# Patient Record
Sex: FEMALE | Race: WHITE | Employment: OTHER | URBAN - METROPOLITAN AREA
[De-identification: names, ages, dates, MRNs, and addresses within clinical notes are randomized per-mention and may not be internally consistent; named-entity substitution may affect disease eponyms.]

---

## 2017-03-10 ENCOUNTER — OFFICE VISIT (OUTPATIENT)
Dept: FAMILY MEDICINE CLINIC | Age: 67
End: 2017-03-10

## 2017-03-10 VITALS
SYSTOLIC BLOOD PRESSURE: 100 MMHG | DIASTOLIC BLOOD PRESSURE: 60 MMHG | OXYGEN SATURATION: 97 % | BODY MASS INDEX: 21.73 KG/M2 | HEART RATE: 65 BPM | WEIGHT: 115 LBS

## 2017-03-10 DIAGNOSIS — K43.9 HERNIA OF ANTERIOR ABDOMINAL WALL: Primary | ICD-10-CM

## 2017-03-10 DIAGNOSIS — J43.8 OTHER EMPHYSEMA (HCC): ICD-10-CM

## 2017-03-10 DIAGNOSIS — E78.2 MIXED HYPERLIPIDEMIA: ICD-10-CM

## 2017-03-10 DIAGNOSIS — Z76.0 ENCOUNTER FOR MEDICATION REFILL: ICD-10-CM

## 2017-03-10 DIAGNOSIS — R19.00 ABDOMINAL MASS, UNSPECIFIED LOCATION: ICD-10-CM

## 2017-03-10 DIAGNOSIS — Z13.9 SCREENING: ICD-10-CM

## 2017-03-10 DIAGNOSIS — R53.83 FATIGUE, UNSPECIFIED TYPE: ICD-10-CM

## 2017-03-10 DIAGNOSIS — J44.9 OBSTRUCTIVE CHRONIC BRONCHITIS WITHOUT EXACERBATION (HCC): ICD-10-CM

## 2017-03-10 PROCEDURE — G8399 PT W/DXA RESULTS DOCUMENT: HCPCS | Performed by: FAMILY MEDICINE

## 2017-03-10 PROCEDURE — 99213 OFFICE O/P EST LOW 20 MIN: CPT | Performed by: FAMILY MEDICINE

## 2017-03-10 PROCEDURE — 1090F PRES/ABSN URINE INCON ASSESS: CPT | Performed by: FAMILY MEDICINE

## 2017-03-10 PROCEDURE — 1123F ACP DISCUSS/DSCN MKR DOCD: CPT | Performed by: FAMILY MEDICINE

## 2017-03-10 PROCEDURE — 1036F TOBACCO NON-USER: CPT | Performed by: FAMILY MEDICINE

## 2017-03-10 PROCEDURE — 3014F SCREEN MAMMO DOC REV: CPT | Performed by: FAMILY MEDICINE

## 2017-03-10 PROCEDURE — G8484 FLU IMMUNIZE NO ADMIN: HCPCS | Performed by: FAMILY MEDICINE

## 2017-03-10 PROCEDURE — G8427 DOCREV CUR MEDS BY ELIG CLIN: HCPCS | Performed by: FAMILY MEDICINE

## 2017-03-10 PROCEDURE — G8926 SPIRO NO PERF OR DOC: HCPCS | Performed by: FAMILY MEDICINE

## 2017-03-10 PROCEDURE — G8419 CALC BMI OUT NRM PARAM NOF/U: HCPCS | Performed by: FAMILY MEDICINE

## 2017-03-10 PROCEDURE — 3017F COLORECTAL CA SCREEN DOC REV: CPT | Performed by: FAMILY MEDICINE

## 2017-03-10 PROCEDURE — 3023F SPIROM DOC REV: CPT | Performed by: FAMILY MEDICINE

## 2017-03-10 PROCEDURE — 4040F PNEUMOC VAC/ADMIN/RCVD: CPT | Performed by: FAMILY MEDICINE

## 2017-03-10 RX ORDER — PROPRANOLOL HYDROCHLORIDE 10 MG/1
TABLET ORAL
Qty: 270 TABLET | Refills: 3 | Status: SHIPPED | OUTPATIENT
Start: 2017-03-10 | End: 2018-06-05 | Stop reason: SDUPTHER

## 2017-03-10 RX ORDER — TOPIRAMATE 25 MG/1
25 TABLET ORAL DAILY
Qty: 180 TABLET | Refills: 1 | Status: SHIPPED | OUTPATIENT
Start: 2017-03-10 | End: 2018-06-05 | Stop reason: SDUPTHER

## 2017-03-10 RX ORDER — SUMATRIPTAN 100 MG/1
TABLET, FILM COATED ORAL
Qty: 9 TABLET | Refills: 2 | Status: SHIPPED | OUTPATIENT
Start: 2017-03-10 | End: 2019-04-29 | Stop reason: SDUPTHER

## 2017-03-16 ENCOUNTER — OFFICE VISIT (OUTPATIENT)
Dept: SURGERY | Age: 67
End: 2017-03-16

## 2017-03-16 VITALS — BODY MASS INDEX: 21.92 KG/M2 | WEIGHT: 116 LBS | DIASTOLIC BLOOD PRESSURE: 64 MMHG | SYSTOLIC BLOOD PRESSURE: 110 MMHG

## 2017-03-16 DIAGNOSIS — R22.42 HIP MASS, LEFT: ICD-10-CM

## 2017-03-16 DIAGNOSIS — K43.9 VENTRAL HERNIA WITHOUT OBSTRUCTION OR GANGRENE: Primary | ICD-10-CM

## 2017-03-16 PROCEDURE — 1123F ACP DISCUSS/DSCN MKR DOCD: CPT | Performed by: SURGERY

## 2017-03-16 PROCEDURE — 3017F COLORECTAL CA SCREEN DOC REV: CPT | Performed by: SURGERY

## 2017-03-16 PROCEDURE — G8399 PT W/DXA RESULTS DOCUMENT: HCPCS | Performed by: SURGERY

## 2017-03-16 PROCEDURE — G8484 FLU IMMUNIZE NO ADMIN: HCPCS | Performed by: SURGERY

## 2017-03-16 PROCEDURE — G8427 DOCREV CUR MEDS BY ELIG CLIN: HCPCS | Performed by: SURGERY

## 2017-03-16 PROCEDURE — 1036F TOBACCO NON-USER: CPT | Performed by: SURGERY

## 2017-03-16 PROCEDURE — G8419 CALC BMI OUT NRM PARAM NOF/U: HCPCS | Performed by: SURGERY

## 2017-03-16 PROCEDURE — 4040F PNEUMOC VAC/ADMIN/RCVD: CPT | Performed by: SURGERY

## 2017-03-16 PROCEDURE — 1090F PRES/ABSN URINE INCON ASSESS: CPT | Performed by: SURGERY

## 2017-03-16 PROCEDURE — 99203 OFFICE O/P NEW LOW 30 MIN: CPT | Performed by: SURGERY

## 2017-03-16 PROCEDURE — 3014F SCREEN MAMMO DOC REV: CPT | Performed by: SURGERY

## 2017-03-16 ASSESSMENT — ENCOUNTER SYMPTOMS
ABDOMINAL PAIN: 1
DIARRHEA: 1
CONSTIPATION: 1
EYES NEGATIVE: 1
RESPIRATORY NEGATIVE: 1

## 2017-04-19 ENCOUNTER — HOSPITAL ENCOUNTER (OUTPATIENT)
Dept: SURGERY | Age: 67
Discharge: HOME | End: 2017-04-19
Attending: SURGERY | Admitting: SURGERY

## 2017-04-19 VITALS
HEART RATE: 60 BPM | WEIGHT: 111.33 LBS | DIASTOLIC BLOOD PRESSURE: 43 MMHG | OXYGEN SATURATION: 99 % | RESPIRATION RATE: 14 BRPM | TEMPERATURE: 97.6 F | HEIGHT: 61 IN | BODY MASS INDEX: 21.02 KG/M2 | SYSTOLIC BLOOD PRESSURE: 110 MMHG

## 2017-04-19 PROCEDURE — 27047 EXC HIP/PELVIS LES SC < 3 CM: CPT | Performed by: SURGERY

## 2017-04-19 RX ORDER — HYDROCODONE BITARTRATE AND ACETAMINOPHEN 5; 325 MG/1; MG/1
1 TABLET ORAL EVERY 4 HOURS PRN
Qty: 15 TABLET | Refills: 0 | Status: SHIPPED | OUTPATIENT
Start: 2017-04-19 | End: 2017-04-26

## 2017-04-19 ASSESSMENT — PAIN - FUNCTIONAL ASSESSMENT: PAIN_FUNCTIONAL_ASSESSMENT: 0-10

## 2017-04-19 ASSESSMENT — PAIN SCALES - GENERAL: PAINLEVEL_OUTOF10: 0

## 2017-09-20 ENCOUNTER — TELEPHONE (OUTPATIENT)
Dept: FAMILY MEDICINE CLINIC | Age: 67
End: 2017-09-20

## 2017-09-20 DIAGNOSIS — E78.2 MIXED HYPERLIPIDEMIA: ICD-10-CM

## 2017-09-20 DIAGNOSIS — R53.83 FATIGUE, UNSPECIFIED TYPE: ICD-10-CM

## 2017-09-20 DIAGNOSIS — Z13.9 SCREENING FOR CONDITION: ICD-10-CM

## 2017-09-20 LAB
A/G RATIO: 1.9 (ref 1.1–2.2)
ALBUMIN SERPL-MCNC: 4.3 G/DL (ref 3.4–5)
ALP BLD-CCNC: 57 U/L (ref 40–129)
ALT SERPL-CCNC: 12 U/L (ref 10–40)
ANION GAP SERPL CALCULATED.3IONS-SCNC: 15 MMOL/L (ref 3–16)
AST SERPL-CCNC: 11 U/L (ref 15–37)
BILIRUB SERPL-MCNC: 0.4 MG/DL (ref 0–1)
BUN BLDV-MCNC: 12 MG/DL (ref 7–20)
CALCIUM SERPL-MCNC: 9.7 MG/DL (ref 8.3–10.6)
CHLORIDE BLD-SCNC: 102 MMOL/L (ref 99–110)
CHOLESTEROL, TOTAL: 184 MG/DL (ref 0–199)
CO2: 21 MMOL/L (ref 21–32)
CREAT SERPL-MCNC: 0.6 MG/DL (ref 0.6–1.2)
ESTIMATED AVERAGE GLUCOSE: 102.5 MG/DL
GFR AFRICAN AMERICAN: >60
GFR NON-AFRICAN AMERICAN: >60
GLOBULIN: 2.3 G/DL
GLUCOSE BLD-MCNC: 93 MG/DL (ref 70–99)
HBA1C MFR BLD: 5.2 %
HCT VFR BLD CALC: 42.7 % (ref 36–48)
HDLC SERPL-MCNC: 66 MG/DL (ref 40–60)
HEMOGLOBIN: 14.3 G/DL (ref 12–16)
HEPATITIS C ANTIBODY INTERPRETATION: NORMAL
LDL CHOLESTEROL CALCULATED: 102 MG/DL
MCH RBC QN AUTO: 32.6 PG (ref 26–34)
MCHC RBC AUTO-ENTMCNC: 33.4 G/DL (ref 31–36)
MCV RBC AUTO: 97.6 FL (ref 80–100)
PDW BLD-RTO: 13.5 % (ref 12.4–15.4)
PLATELET # BLD: 207 K/UL (ref 135–450)
PMV BLD AUTO: 8.2 FL (ref 5–10.5)
POTASSIUM SERPL-SCNC: 4 MMOL/L (ref 3.5–5.1)
RBC # BLD: 4.37 M/UL (ref 4–5.2)
SODIUM BLD-SCNC: 138 MMOL/L (ref 136–145)
T4 FREE: 1.3 NG/DL (ref 0.9–1.8)
TOTAL PROTEIN: 6.6 G/DL (ref 6.4–8.2)
TRIGL SERPL-MCNC: 78 MG/DL (ref 0–150)
TSH SERPL DL<=0.05 MIU/L-ACNC: 1.14 UIU/ML (ref 0.27–4.2)
VITAMIN D 25-HYDROXY: 28.8 NG/ML
VLDLC SERPL CALC-MCNC: 16 MG/DL
WBC # BLD: 5.3 K/UL (ref 4–11)

## 2017-10-23 ENCOUNTER — OFFICE VISIT (OUTPATIENT)
Dept: FAMILY MEDICINE CLINIC | Age: 67
End: 2017-10-23

## 2017-10-23 VITALS
HEART RATE: 65 BPM | SYSTOLIC BLOOD PRESSURE: 100 MMHG | OXYGEN SATURATION: 95 % | WEIGHT: 109 LBS | HEIGHT: 63 IN | BODY MASS INDEX: 19.31 KG/M2 | DIASTOLIC BLOOD PRESSURE: 60 MMHG | RESPIRATION RATE: 14 BRPM

## 2017-10-23 DIAGNOSIS — E78.2 MIXED HYPERLIPIDEMIA: Primary | ICD-10-CM

## 2017-10-23 DIAGNOSIS — C50.912 MALIGNANT NEOPLASM OF LEFT FEMALE BREAST, UNSPECIFIED ESTROGEN RECEPTOR STATUS, UNSPECIFIED SITE OF BREAST (HCC): ICD-10-CM

## 2017-10-23 DIAGNOSIS — M81.0 OSTEOPOROSIS WITHOUT CURRENT PATHOLOGICAL FRACTURE, UNSPECIFIED OSTEOPOROSIS TYPE: ICD-10-CM

## 2017-10-23 DIAGNOSIS — F43.21 GRIEF: ICD-10-CM

## 2017-10-23 DIAGNOSIS — J43.8 OTHER EMPHYSEMA (HCC): ICD-10-CM

## 2017-10-23 DIAGNOSIS — Z72.0 TOBACCO ABUSE: ICD-10-CM

## 2017-10-23 PROCEDURE — G0444 DEPRESSION SCREEN ANNUAL: HCPCS | Performed by: FAMILY MEDICINE

## 2017-10-23 PROCEDURE — G0439 PPPS, SUBSEQ VISIT: HCPCS | Performed by: FAMILY MEDICINE

## 2017-10-23 ASSESSMENT — ANXIETY QUESTIONNAIRES: GAD7 TOTAL SCORE: 0

## 2017-10-23 ASSESSMENT — LIFESTYLE VARIABLES: HOW OFTEN DO YOU HAVE A DRINK CONTAINING ALCOHOL: 0

## 2017-10-23 NOTE — PROGRESS NOTES
Medicare Annual Wellness Visit       Milo De Anda  YOB: 1950    Date of Service:  10/23/2017    Patient Active Problem List   Diagnosis    Hyperlipidemia    Polyp of colon    [facility] [address]    Pleural plaque with presence of asbestos    GERD (gastroesophageal reflux disease)    Hip mass       Allergies   Allergen Reactions    Montelukast Sodium Other (See Comments)     Vision issues     Outpatient Prescriptions Marked as Taking for the 10/23/17 encounter (Office Visit) with Edel Ochoa MD   Medication Sig Dispense Refill    propranolol (INDERAL) 10 MG tablet TAKE 1 TABLET BY MOUTH THREE TIMES DAILY 270 tablet 3    SUMAtriptan (IMITREX) 100 MG tablet TAKE 1 TABLET BY MOUTH FOR 1 DOSE AS NEEDED FOR MIGRAINE 9 tablet 2    topiramate (TOPAMAX) 25 MG tablet Take 1 tablet by mouth daily 180 tablet 1       Past Medical History:   Diagnosis Date    Acne     [facility] cancer ([facility] [address]     Cellulitis     [facility]     GERD (gastroesophageal reflux disease)     Hyperlipidemia     Osteoporosis     Polyp of colon      Past Surgical History:   Procedure Laterality Date    APPENDECTOMY      BREAST SURGERY      DILATION AND CURETTAGE OF UTERUS      EYE SURGERY Right 3/21/15    macular \"hole\"    HAND SURGERY       History reviewed. No pertinent family history. Social History     Social History    Marital status:      Spouse name: N/A    Number of children: N/A    Years of education: N/A     Occupational History    Not on file. Social History Main Topics    Smoking status: Former Smoker     Packs/day: 1.00     Years: 30.00     Types: Cigarettes     Quit date: 3/1/2010    Smokeless tobacco: Never Used      Comment: H.O.smoking 1 p.p.d x 30+ yrs / Quit 3/1/2010    Alcohol use Yes      Comment: 1 beer/mo.     Drug use: Unknown    Sexual activity: Not on file     Other Topics Concern    Not on file     Social History Narrative    No narrative on file       Consultants:   Patient Care Team:  Africa Esparza MD as PCP - Mary Hickman MD as PCP - [facility] Attributed Provider  Markie Escalante MD as Surgeon (General Surgery)  Dr. Morgan Hendricks, [address] form completed by patient, reviewed and scanned into chart. Summary of HRA, and behavioral, psychosocial, cognitive and functional/safety screening results are documented in additional note within this encounter. Wt Readings from Last 3 Encounters:   10/23/17 109 lb (49.4 kg)   17 111 lb 5.3 oz (50.5 kg)   17 110 lb (49.9 kg)     BP Readings from Last 3 Encounters:   10/23/17 100/60   17 (!) 110/43   17 110/64     View of systems:   last May and still grieving  Lives alone not eating much  Back to smoking about 5 cigarettes a day line exercises quite a bit  Headaches are well controlled only gets one a month  Tremor is worse and is about to see her neurologist next week  Pertinent Physical Exam    Vitals:    10/23/17 0838   BP: 100/60   Site: Left Arm   Position: Sitting   Cuff Size: Medium Adult   Pulse: 65   Resp: 14   SpO2: 95%   Weight: 109 lb (49.4 kg)   Height: 5' 2.8\" (1.595 m)     Body mass index is 19.43 kg/m².    General Appearance: alert and oriented to person, place and time, well-developed and well-nourished, in no acute distress, thin, resting tremor  Neck: neck supple and non tender without mass, no thyromegaly or thyroid nodules, no cervical lymphadenopathy   Pulmonary/Chest: clear to auscultation bilaterally- no wheezes, rales or rhonchi, normal air movement, no respiratory distress  Cardiovascular: normal rate, normal S1 and S2, no gallops, intact distal pulses and no carotid bruits  Abdomen: soft, non-tender, non-distended, normal bowel sounds, no masses or organomegaly  Extremities: no cyanosis and no clubbing  Neuro: Significant resting tremor left hand greater than right hand and head-bobbing  Current Health Maintenance Status  Health Maintenance   Topic Date Due    DTaP/Tdap/Td vaccine (1 - Tdap) 12/15/1967    Breast cancer screen  12/15/1998    Zostavax vaccine  12/15/2008    Flu vaccine (1) 09/01/2017    Pneumococcal low/med risk (2 of 2 - PPSV23) 09/12/2017    Colon cancer screen colonoscopy  05/01/2019    Lipid screen  09/20/2022    DEXA (modify frequency per FRAX score)  Completed    Hepatitis C screen  Completed         Personalized Preventive Plan   This plan is provided to the patient in written form.        Preventive plan of care for Nyla Eastern        10/23/2017           Preventive Measures Status       Recommendations for screening   Colon Cancer Screen   Last colonoscopy: 5-14 Screening covered every 10 years  Repeat in 5 years   Breast Cancer Screen  Last mammogram: years  Covered annually  Repeat screening is not clinically indicated since had double mastectomy   Cervical Cancer Screen   Last PAP smear: years Covered every 2 years, annually if high risk  This test is not clinically indicated   Osteoporosis Screen   Last DXA scan: 2014 Covered every 2 years  Test recommended and ordered   Diabetes Screen  Glucose (mg/dL)   Date Value   09/20/2017 93    Screening covered annually, every 6 months if pre-diabetic  Repeat yearly   Cholesterol Screen  Lab Results   Component Value Date    CHOL 184 09/20/2017    TRIG 78 09/20/2017    HDL 66 (H) 09/20/2017    LDLCALC 102 (H) 09/20/2017    Screening covered every 5 years   Repeat yearly   Aspirin for Cardiovascular Prevention   No Not indicated    Recommended Immunizations    Immunization History   Administered Date(s) Administered    Influenza Virus Vaccine 09/16/2011    Influenza, High Dose 01/06/2016    Influenza, Intradermal, Preservative free 09/12/2012    PPD Test 03/14/2011    Pneumococcal 13-valent Conjugate ([id number]) 01/06/2016    Pneumococcal Polysaccharide ([id number]) 09/12/2012

## 2017-10-23 NOTE — PATIENT INSTRUCTIONS
and other things that can increase your risk for heart attack and stroke. · Blood pressure. Have your blood pressure checked during a routine doctor visit. Your doctor will tell you how often to check your blood pressure based on your age, your blood pressure results, and other factors. · Diabetes. Ask your doctor whether you should have tests for diabetes. · Vision. Experts recommend that you have yearly exams for glaucoma and other age-related eye problems. · Hearing. Tell your doctor if you notice any change in your hearing. You can have tests to find out how well you hear. · Colon cancer tests. Keep having colon cancer tests as your doctor recommends. You can have one of several types of tests. · Heart attack and stroke risk. At least every 4 to 6 years, you should have your risk for heart attack and stroke assessed. Your doctor uses factors such as your age, blood pressure, cholesterol, and whether you smoke or have diabetes to show what your risk for a heart attack or stroke is over the next 10 years. · Osteoporosis. Talk to your doctor about whether you should have a bone density test to find out whether you have thinning bones. Also ask your doctor about whether you should take calcium and vitamin D supplements. For women  · Pap test and pelvic exam. You may no longer need a Pap test. Talk with your doctor about whether to stop or continue to have Pap tests. · Breast exam and mammogram. Ask how often you should have a mammogram, which is an X-ray of your breasts. A mammogram can spot breast cancer before it can be felt and when it is easiest to treat. · Thyroid disease. Talk to your doctor about whether to have your thyroid checked as part of a regular physical exam. Women have an increased chance of a thyroid problem. For men  · Prostate exam. Talk to your doctor about whether you should have a blood test (called a PSA test) for prostate cancer.  Experts disagree on whether men should have this test. Some experts recommend that you discuss the benefits and risks of the test with your doctor. · Abdominal aortic aneurysm. Ask your doctor whether you should have a test to check for an aneurysm. You may need a test if you ever smoked or if your parent, brother, sister, or child has had an aneurysm. When should you call for help? Watch closely for changes in your health, and be sure to contact your doctor if you have any problems or symptoms that concern you. Where can you learn more? Go to [email or web address]"[employer] org and sign in to your [employer] account. Enter I208 in the [employer] box to learn more about \"Well Visit, Over 65: Care Instructions. \"     If you do not have an account, please click on the \"Sign Up Now\" link. Current as of: July 19, 2016  Content Version: 11.3  © 1855-0832 [employer]. Care instructions adapted under license by [facility] ([facility]). If you have questions about a medical condition or this instruction, always ask your healthcare professional. [address] any warranty or liability for your use of this information.

## 2017-10-26 ENCOUNTER — HOSPITAL ENCOUNTER (OUTPATIENT)
Dept: GENERAL RADIOLOGY | Age: 67
Discharge: HOME | End: 2017-10-26
Attending: FAMILY MEDICINE | Admitting: FAMILY MEDICINE

## 2017-10-26 DIAGNOSIS — M81.0 AGE-RELATED OSTEOPOROSIS WITHOUT CURRENT PATHOLOGICAL FRACTURE: ICD-10-CM

## 2017-10-26 DIAGNOSIS — M81.0 OSTEOPOROSIS, UNSPECIFIED OSTEOPOROSIS TYPE, UNSPECIFIED PATHOLOGICAL FRACTURE PRESENCE: ICD-10-CM

## 2017-10-26 RX ORDER — ALENDRONATE SODIUM 70 MG/1
70 TABLET ORAL
Qty: 4 TABLET | Refills: 11 | Status: SHIPPED | OUTPATIENT
Start: 2017-10-26 | End: 2017-10-26 | Stop reason: SDUPTHER

## 2017-10-27 RX ORDER — ALENDRONATE SODIUM 70 MG/1
70 TABLET ORAL
Qty: 12 TABLET | Refills: 3 | Status: SHIPPED | OUTPATIENT
Start: 2017-10-27 | End: 2017-11-08 | Stop reason: SDUPTHER

## 2017-11-02 ENCOUNTER — OFFICE VISIT (OUTPATIENT)
Dept: NEUROLOGY | Age: 67
End: 2017-11-02

## 2017-11-02 VITALS
DIASTOLIC BLOOD PRESSURE: 74 MMHG | WEIGHT: 108 LBS | SYSTOLIC BLOOD PRESSURE: 100 MMHG | BODY MASS INDEX: 19.88 KG/M2 | HEIGHT: 62 IN | HEART RATE: 77 BPM

## 2017-11-02 DIAGNOSIS — R26.0 TITUBATION: ICD-10-CM

## 2017-11-02 DIAGNOSIS — G25.0 BENIGN ESSENTIAL TREMOR: Primary | ICD-10-CM

## 2017-11-02 PROCEDURE — 3014F SCREEN MAMMO DOC REV: CPT | Performed by: PSYCHIATRY & NEUROLOGY

## 2017-11-02 PROCEDURE — G8420 CALC BMI NORM PARAMETERS: HCPCS | Performed by: PSYCHIATRY & NEUROLOGY

## 2017-11-02 PROCEDURE — 3017F COLORECTAL CA SCREEN DOC REV: CPT | Performed by: PSYCHIATRY & NEUROLOGY

## 2017-11-02 PROCEDURE — 1036F TOBACCO NON-USER: CPT | Performed by: PSYCHIATRY & NEUROLOGY

## 2017-11-02 PROCEDURE — 1090F PRES/ABSN URINE INCON ASSESS: CPT | Performed by: PSYCHIATRY & NEUROLOGY

## 2017-11-02 PROCEDURE — 99204 OFFICE O/P NEW MOD 45 MIN: CPT | Performed by: PSYCHIATRY & NEUROLOGY

## 2017-11-02 PROCEDURE — G8399 PT W/DXA RESULTS DOCUMENT: HCPCS | Performed by: PSYCHIATRY & NEUROLOGY

## 2017-11-02 PROCEDURE — 4040F PNEUMOC VAC/ADMIN/RCVD: CPT | Performed by: PSYCHIATRY & NEUROLOGY

## 2017-11-02 PROCEDURE — G8484 FLU IMMUNIZE NO ADMIN: HCPCS | Performed by: PSYCHIATRY & NEUROLOGY

## 2017-11-02 PROCEDURE — G8427 DOCREV CUR MEDS BY ELIG CLIN: HCPCS | Performed by: PSYCHIATRY & NEUROLOGY

## 2017-11-02 PROCEDURE — 1123F ACP DISCUSS/DSCN MKR DOCD: CPT | Performed by: PSYCHIATRY & NEUROLOGY

## 2017-11-02 RX ORDER — PRIMIDONE 50 MG/1
TABLET ORAL
Qty: 30 TABLET | Refills: 1 | Status: SHIPPED | OUTPATIENT
Start: 2017-11-02 | End: 2018-01-02 | Stop reason: SDUPTHER

## 2017-11-02 NOTE — PROGRESS NOTES
NEUROLOGY CONSULTATION     Chief Complaint   Patient presents with    Tremors     Patient is here today to establish care for shaking. Patient says that she has been shaking for the past 3 years now. HISTORY OF PRESENT ILLNESS :    Sarah Corcoran is a 74 y.o. female who is referred by Dr. Juan R Valdez   History was obtained from patient  Patient was referred for evaluation of tremors. Patient states that the symptom started approximately 8 years ago  Onset was gradual.  Symptoms are slowly progressive.   Initially the symptoms were mild but now they seem more severe  Anxiety and stress seem to make the symptoms worse  She has not noticed any significant improvement with alcohol  There is no family history of similar tremors that she knows off  She was started on propranolol which seemed to help initially but now it does not help much anymore    REVIEW OF SYSTEMS    Constitutional:  [x]   Chills   [x]  Fatigue   []  Fevers   []  Malaise   [x]  Weight loss     [] Denies all of the above    Eyes:  []  Double vision   [x]  Blurry vision     [] Denies all of the above    Ears, nose, mouth, throat, and face:   [] Hearing loss    []   Hoarseness      []  Snoring    []  Tinnitus       [x] Denies all of the above     Respiratory:   []  Cough    []  Shortness of breath         [x] Denies all of the above     Cardiovascular:   []  Chest pain    []  Exertional chest pressure/discomfort           [] Palpitations    []  Syncope     [x] Denies all of the above    Gastrointestinal:   []  Abdominal pain   [x]  Constipation    []  Diarrhea    []   Dysphagia                      [] Denies all of the above    Genitourinary:      [x]  Frequency   []  Hematuria     []  Urinary incontinence           [] Denies all of the above     Hematologic/lymphatic:  []  Bleeding    [x]  Easy bruising   []  Anemia  [] Denies all of the above     Musculoskeletal:   [x] smile,cheek puffing and eyebrow elevation  VIII: Hearing:  Intact to finger rub bilaterally  IX: Palate  elevation is symmetric  XI: Shoulder shrug is intact  XII: Tongue movements are normal  Motor:  Muscle tone and bulk are normal.   Strength is symmetrical 5/5 in all four extremities. Sensory: Intact to light touch and  pin prick in all four extremities  Coordination:  Normal  Finger to Nose and Heel to Shin bilaterally    . Reflexes:  DTR +2 and symmetric bilaterally  Plantar response: Flexor bilaterally  Gait: Gait and station is normal. Patient can toe/ heel and tandem walk without difficulty  Romberg: negative  Vascular: No carotid bruit bilaterally        DATA:  LABS:  General Labs:    CBC:   Lab Results   Component Value Date    WBC 5.3 09/20/2017    RBC 4.37 09/20/2017    HGB 14.3 09/20/2017    HCT 42.7 09/20/2017    MCV 97.6 09/20/2017    MCH 32.6 09/20/2017    MCHC 33.4 09/20/2017    RDW 13.5 09/20/2017     09/20/2017    MPV 8.2 09/20/2017     BMP:    Lab Results   Component Value Date     09/20/2017    K 4.0 09/20/2017     09/20/2017    CO2 21 09/20/2017    BUN 12 09/20/2017    LABALBU 4.3 09/20/2017    CREATININE 0.6 09/20/2017    CALCIUM 9.7 09/20/2017    GFRAA >60 09/20/2017    GFRAA >60 09/06/2012    LABGLOM >60 09/20/2017    GLUCOSE 93 09/20/2017       IMPRESSION :  Benign essential tremors  Titubation of her head  No clinical evidence for Parkinson's disease  Stress and anxiety probably make the symptoms worse      RECOMMENDATIONS :  Discussed with patient about treatment options  Trial of low-dose primidone  Side effects were discussed. I will see her back in 2 months for follow-up        Please note a portion of this chart was generated using dragon dictation software. Although every effort was made to ensure the accuracy of this automated transcription, some errors in transcription may have occurred.

## 2017-11-02 NOTE — LETTER
Palpitations      Syncope      Denies all of the above    Gastrointestinal:     Abdominal pain     Constipation      Diarrhea       Dysphagia                       Denies all of the above    Genitourinary:        Frequency     Hematuria       Urinary incontinence            Denies all of the above     Hematologic/lymphatic:    Bleeding      Easy bruising     Anemia   Denies all of the above     Musculoskeletal:    Back pain         Myalgias      Neck pain            Denies all of the above    Neurological: As noted in HPI    Behavioral/Psych: Anxiety      Depression       Mood swings      Denies all of the above     Endocrine:     Temperature intolerance      Fatigue       Denies all of the above     Allergic/Immunologic:    Hay fever     Denies all of the above     Past Medical History:   Diagnosis Date    Acne     [facility] [address]     Cellulitis     [facility]     GERD (gastroesophageal reflux disease)     Hyperlipidemia     Osteoporosis     Polyp of colon      No family history on file. Social History     Social History    Marital status:      Spouse name: N/A    Number of children: N/A    Years of education: N/A     Social History Main Topics    Smoking status: Former Smoker     Packs/day: 1.00     Years: 30.00     Types: Cigarettes     Quit date: 3/1/2010    Smokeless tobacco: Never Used      Comment: H.O.smoking 1 p.p.d x 30+ yrs / Quit 3/1/2010    Alcohol use Yes      Comment: 1 beer/mo.  Drug use: Unknown    Sexual activity: Not Asked     Other Topics Concern    None     Social History Narrative    None       PHYSICAL EXAMINATION:  /74   Pulse 77   Ht 5' 2\" (1.575 m)   Wt 108 lb (49 kg)   BMI 19.75 kg/m²    Appearance: Well appearing, thinly built and in no distress  Mental Status Exam: Patient is alert, oriented to person, place and time.    Recent and remote memory is normal  Fund of Knowledge is normal Attention/concentration is normal.   Speech : No dysarthria  Language : No aphasia  Funduscopic Exam: sharp disc margins  Cranial Nerves:   II: Visual fields:  Full to confrontation  III: Pupils:  equal, round, reactive to light  III,IV,VI: Extra Ocular Movements are intact. No nystagmus  V: Facial sensation is intact to pin prick and light touch  VII: Facial strength and movements: intact and symmetric smile,cheek puffing and eyebrow elevation  VIII: Hearing:  Intact to finger rub bilaterally  IX: Palate  elevation is symmetric  XI: Shoulder shrug is intact  XII: Tongue movements are normal  Motor:  Muscle tone and bulk are normal.   Strength is symmetrical 5/5 in all four extremities. Sensory: Intact to light touch and  pin prick in all four extremities  Coordination:  Normal  Finger to Nose and Heel to Shin bilaterally    . Reflexes:  DTR +2 and symmetric bilaterally  Plantar response: Flexor bilaterally  Gait: Gait and station is normal. Patient can toe/ heel and tandem walk without difficulty  Romberg: negative  Vascular: No carotid bruit bilaterally        DATA:  LABS:  General Labs:    CBC:   Lab Results   Component Value Date    WBC 5.3 09/20/2017    RBC 4.37 09/20/2017    HGB 14.3 09/20/2017    HCT 42.7 09/20/2017    MCV 97.6 09/20/2017    MCH 32.6 09/20/2017    MCHC 33.4 09/20/2017    RDW 13.5 09/20/2017     09/20/2017    MPV 8.2 09/20/2017     BMP:    Lab Results   Component Value Date     09/20/2017    K 4.0 09/20/2017     09/20/2017    CO2 21 09/20/2017    BUN 12 09/20/2017    LABALBU 4.3 09/20/2017    CREATININE 0.6 09/20/2017    CALCIUM 9.7 09/20/2017    GFRAA >60 09/20/2017    GFRAA >60 09/06/2012    LABGLOM >60 09/20/2017    GLUCOSE 93 09/20/2017       IMPRESSION :  Benign essential tremors  Titubation of her head  No clinical evidence for Parkinson's disease  Stress and anxiety probably make the symptoms worse      RECOMMENDATIONS :

## 2017-11-02 NOTE — PATIENT INSTRUCTIONS
Please call with any questions or concerns:   [facility]  @ [phone]. LAB RESULTS:  Please obtain any labs or diagnostic tests as discussed today. You may call the office to check the results. Please allow  3 to 7 days for us to get these results. MEDICATION LIST:  Please bring an accurate list of your medications to every visit. APPOINTMENT CONFIRMATION:  We will call you the day before your scheduled appointment to confirm. If we are unable to reach you, you MUST call back by the end of the day to confirm the appointment or we may be forced to cancel.

## 2017-11-08 RX ORDER — ALENDRONATE SODIUM 70 MG/1
70 TABLET ORAL
Qty: 12 TABLET | Refills: 3 | Status: SHIPPED | OUTPATIENT
Start: 2017-11-08 | End: 2018-06-05 | Stop reason: SDUPTHER

## 2017-11-08 NOTE — TELEPHONE ENCOUNTER
Medication and Quantity requested: Alendronate, 90 day supply w/refills  RX was sent to [facility] , needs to go to mail order in Epic    Last Visit  10-23-17    Pharmacy and phone number updated in EPIC:  yes        ,

## 2018-01-02 ENCOUNTER — OFFICE VISIT (OUTPATIENT)
Dept: NEUROLOGY | Age: 68
End: 2018-01-02

## 2018-01-02 VITALS
BODY MASS INDEX: 20.61 KG/M2 | HEIGHT: 62 IN | DIASTOLIC BLOOD PRESSURE: 75 MMHG | WEIGHT: 112 LBS | SYSTOLIC BLOOD PRESSURE: 102 MMHG | HEART RATE: 70 BPM

## 2018-01-02 DIAGNOSIS — G25.0 BENIGN ESSENTIAL TREMOR: ICD-10-CM

## 2018-01-02 PROCEDURE — 99213 OFFICE O/P EST LOW 20 MIN: CPT | Performed by: PSYCHIATRY & NEUROLOGY

## 2018-01-02 RX ORDER — PRIMIDONE 50 MG/1
TABLET ORAL
Qty: 180 TABLET | Refills: 0 | Status: SHIPPED | OUTPATIENT
Start: 2018-01-02 | End: 2018-04-02 | Stop reason: SDUPTHER

## 2018-04-02 ENCOUNTER — OFFICE VISIT (OUTPATIENT)
Dept: NEUROLOGY | Age: 68
End: 2018-04-02

## 2018-04-02 VITALS
WEIGHT: 112 LBS | HEIGHT: 62 IN | DIASTOLIC BLOOD PRESSURE: 76 MMHG | BODY MASS INDEX: 20.61 KG/M2 | SYSTOLIC BLOOD PRESSURE: 106 MMHG | HEART RATE: 56 BPM

## 2018-04-02 DIAGNOSIS — G25.0 BENIGN ESSENTIAL TREMOR: ICD-10-CM

## 2018-04-02 PROCEDURE — 99213 OFFICE O/P EST LOW 20 MIN: CPT | Performed by: PSYCHIATRY & NEUROLOGY

## 2018-04-02 PROCEDURE — 3014F SCREEN MAMMO DOC REV: CPT | Performed by: PSYCHIATRY & NEUROLOGY

## 2018-04-02 PROCEDURE — G8399 PT W/DXA RESULTS DOCUMENT: HCPCS | Performed by: PSYCHIATRY & NEUROLOGY

## 2018-04-02 PROCEDURE — G8420 CALC BMI NORM PARAMETERS: HCPCS | Performed by: PSYCHIATRY & NEUROLOGY

## 2018-04-02 PROCEDURE — 1090F PRES/ABSN URINE INCON ASSESS: CPT | Performed by: PSYCHIATRY & NEUROLOGY

## 2018-04-02 PROCEDURE — 1123F ACP DISCUSS/DSCN MKR DOCD: CPT | Performed by: PSYCHIATRY & NEUROLOGY

## 2018-04-02 PROCEDURE — G8427 DOCREV CUR MEDS BY ELIG CLIN: HCPCS | Performed by: PSYCHIATRY & NEUROLOGY

## 2018-04-02 PROCEDURE — 3017F COLORECTAL CA SCREEN DOC REV: CPT | Performed by: PSYCHIATRY & NEUROLOGY

## 2018-04-02 PROCEDURE — 4040F PNEUMOC VAC/ADMIN/RCVD: CPT | Performed by: PSYCHIATRY & NEUROLOGY

## 2018-04-02 PROCEDURE — 1036F TOBACCO NON-USER: CPT | Performed by: PSYCHIATRY & NEUROLOGY

## 2018-04-02 RX ORDER — PRIMIDONE 50 MG/1
TABLET ORAL
Qty: 180 TABLET | Refills: 1 | Status: SHIPPED | OUTPATIENT
Start: 2018-04-02 | End: 2018-10-01 | Stop reason: SDUPTHER

## 2018-05-29 ENCOUNTER — TELEPHONE (OUTPATIENT)
Dept: FAMILY MEDICINE CLINIC | Age: 68
End: 2018-05-29

## 2018-06-05 ENCOUNTER — OFFICE VISIT (OUTPATIENT)
Dept: FAMILY MEDICINE CLINIC | Age: 68
End: 2018-06-05

## 2018-06-05 VITALS
SYSTOLIC BLOOD PRESSURE: 110 MMHG | HEART RATE: 75 BPM | DIASTOLIC BLOOD PRESSURE: 70 MMHG | OXYGEN SATURATION: 98 % | WEIGHT: 116 LBS | BODY MASS INDEX: 21.22 KG/M2 | TEMPERATURE: 97.4 F

## 2018-06-05 DIAGNOSIS — K21.9 GASTROESOPHAGEAL REFLUX DISEASE WITHOUT ESOPHAGITIS: ICD-10-CM

## 2018-06-05 DIAGNOSIS — H35.342 MACULAR HOLE OF LEFT EYE: Primary | ICD-10-CM

## 2018-06-05 DIAGNOSIS — J43.8 OTHER EMPHYSEMA (HCC): ICD-10-CM

## 2018-06-05 DIAGNOSIS — E78.2 MIXED HYPERLIPIDEMIA: ICD-10-CM

## 2018-06-05 DIAGNOSIS — M81.0 AGE RELATED OSTEOPOROSIS, UNSPECIFIED PATHOLOGICAL FRACTURE PRESENCE: ICD-10-CM

## 2018-06-05 PROCEDURE — 3023F SPIROM DOC REV: CPT | Performed by: FAMILY MEDICINE

## 2018-06-05 PROCEDURE — 1123F ACP DISCUSS/DSCN MKR DOCD: CPT | Performed by: FAMILY MEDICINE

## 2018-06-05 PROCEDURE — 99214 OFFICE O/P EST MOD 30 MIN: CPT | Performed by: FAMILY MEDICINE

## 2018-06-05 PROCEDURE — 3017F COLORECTAL CA SCREEN DOC REV: CPT | Performed by: FAMILY MEDICINE

## 2018-06-05 PROCEDURE — G8420 CALC BMI NORM PARAMETERS: HCPCS | Performed by: FAMILY MEDICINE

## 2018-06-05 PROCEDURE — 1036F TOBACCO NON-USER: CPT | Performed by: FAMILY MEDICINE

## 2018-06-05 PROCEDURE — G8427 DOCREV CUR MEDS BY ELIG CLIN: HCPCS | Performed by: FAMILY MEDICINE

## 2018-06-05 PROCEDURE — G8399 PT W/DXA RESULTS DOCUMENT: HCPCS | Performed by: FAMILY MEDICINE

## 2018-06-05 PROCEDURE — 1090F PRES/ABSN URINE INCON ASSESS: CPT | Performed by: FAMILY MEDICINE

## 2018-06-05 PROCEDURE — G8926 SPIRO NO PERF OR DOC: HCPCS | Performed by: FAMILY MEDICINE

## 2018-06-05 PROCEDURE — 4040F PNEUMOC VAC/ADMIN/RCVD: CPT | Performed by: FAMILY MEDICINE

## 2018-06-05 RX ORDER — TOPIRAMATE 25 MG/1
25 TABLET ORAL 2 TIMES DAILY
Qty: 180 TABLET | Refills: 3 | Status: SHIPPED | OUTPATIENT
Start: 2018-06-05 | End: 2019-04-29 | Stop reason: SDUPTHER

## 2018-06-05 RX ORDER — PROPRANOLOL HYDROCHLORIDE 10 MG/1
TABLET ORAL
Qty: 270 TABLET | Refills: 3 | Status: SHIPPED | OUTPATIENT
Start: 2018-06-05 | End: 2019-04-29 | Stop reason: SDUPTHER

## 2018-06-05 RX ORDER — ALENDRONATE SODIUM 70 MG/1
70 TABLET ORAL
Qty: 13 TABLET | Refills: 3 | Status: SHIPPED | OUTPATIENT
Start: 2018-06-05 | End: 2019-04-29 | Stop reason: SDUPTHER

## 2018-10-01 ENCOUNTER — OFFICE VISIT (OUTPATIENT)
Dept: NEUROLOGY | Age: 68
End: 2018-10-01
Payer: MEDICARE

## 2018-10-01 VITALS
SYSTOLIC BLOOD PRESSURE: 104 MMHG | BODY MASS INDEX: 21.53 KG/M2 | WEIGHT: 117 LBS | HEART RATE: 62 BPM | DIASTOLIC BLOOD PRESSURE: 74 MMHG | HEIGHT: 62 IN

## 2018-10-01 DIAGNOSIS — G25.0 BENIGN ESSENTIAL TREMOR: ICD-10-CM

## 2018-10-01 PROCEDURE — 99213 OFFICE O/P EST LOW 20 MIN: CPT | Performed by: PSYCHIATRY & NEUROLOGY

## 2018-10-01 PROCEDURE — G8399 PT W/DXA RESULTS DOCUMENT: HCPCS | Performed by: PSYCHIATRY & NEUROLOGY

## 2018-10-01 PROCEDURE — 1036F TOBACCO NON-USER: CPT | Performed by: PSYCHIATRY & NEUROLOGY

## 2018-10-01 PROCEDURE — 1123F ACP DISCUSS/DSCN MKR DOCD: CPT | Performed by: PSYCHIATRY & NEUROLOGY

## 2018-10-01 PROCEDURE — 1101F PT FALLS ASSESS-DOCD LE1/YR: CPT | Performed by: PSYCHIATRY & NEUROLOGY

## 2018-10-01 PROCEDURE — 4040F PNEUMOC VAC/ADMIN/RCVD: CPT | Performed by: PSYCHIATRY & NEUROLOGY

## 2018-10-01 PROCEDURE — 1090F PRES/ABSN URINE INCON ASSESS: CPT | Performed by: PSYCHIATRY & NEUROLOGY

## 2018-10-01 PROCEDURE — 3017F COLORECTAL CA SCREEN DOC REV: CPT | Performed by: PSYCHIATRY & NEUROLOGY

## 2018-10-01 PROCEDURE — G8484 FLU IMMUNIZE NO ADMIN: HCPCS | Performed by: PSYCHIATRY & NEUROLOGY

## 2018-10-01 PROCEDURE — G8427 DOCREV CUR MEDS BY ELIG CLIN: HCPCS | Performed by: PSYCHIATRY & NEUROLOGY

## 2018-10-01 PROCEDURE — G8420 CALC BMI NORM PARAMETERS: HCPCS | Performed by: PSYCHIATRY & NEUROLOGY

## 2018-10-01 RX ORDER — PRIMIDONE 50 MG/1
TABLET ORAL
Qty: 270 TABLET | Refills: 0 | Status: SHIPPED | OUTPATIENT
Start: 2018-10-01 | End: 2019-03-13 | Stop reason: SDUPTHER

## 2018-10-04 ENCOUNTER — OFFICE VISIT (OUTPATIENT)
Dept: FAMILY MEDICINE CLINIC | Age: 68
End: 2018-10-04
Payer: MEDICARE

## 2018-10-04 VITALS
RESPIRATION RATE: 16 BRPM | HEIGHT: 62 IN | WEIGHT: 109 LBS | TEMPERATURE: 98.3 F | HEART RATE: 72 BPM | SYSTOLIC BLOOD PRESSURE: 106 MMHG | BODY MASS INDEX: 20.06 KG/M2 | DIASTOLIC BLOOD PRESSURE: 68 MMHG | OXYGEN SATURATION: 99 %

## 2018-10-04 DIAGNOSIS — M81.0 OSTEOPOROSIS, UNSPECIFIED OSTEOPOROSIS TYPE, UNSPECIFIED PATHOLOGICAL FRACTURE PRESENCE: ICD-10-CM

## 2018-10-04 DIAGNOSIS — R25.1 TREMOR: ICD-10-CM

## 2018-10-04 DIAGNOSIS — J92.0 PLEURAL PLAQUE WITH PRESENCE OF ASBESTOS: ICD-10-CM

## 2018-10-04 DIAGNOSIS — H25.9 SENILE CATARACT OF LEFT EYE, UNSPECIFIED AGE-RELATED CATARACT TYPE: Primary | ICD-10-CM

## 2018-10-04 DIAGNOSIS — Z87.891 FORMER TOBACCO USE: ICD-10-CM

## 2018-10-04 DIAGNOSIS — J43.8 OTHER EMPHYSEMA (HCC): ICD-10-CM

## 2018-10-04 DIAGNOSIS — K21.9 GASTROESOPHAGEAL REFLUX DISEASE WITHOUT ESOPHAGITIS: ICD-10-CM

## 2018-10-04 DIAGNOSIS — E78.2 MIXED HYPERLIPIDEMIA: ICD-10-CM

## 2018-10-04 PROCEDURE — G8510 SCR DEP NEG, NO PLAN REQD: HCPCS | Performed by: FAMILY MEDICINE

## 2018-10-04 PROCEDURE — 1090F PRES/ABSN URINE INCON ASSESS: CPT | Performed by: FAMILY MEDICINE

## 2018-10-04 PROCEDURE — G8484 FLU IMMUNIZE NO ADMIN: HCPCS | Performed by: FAMILY MEDICINE

## 2018-10-04 PROCEDURE — 3023F SPIROM DOC REV: CPT | Performed by: FAMILY MEDICINE

## 2018-10-04 PROCEDURE — 99214 OFFICE O/P EST MOD 30 MIN: CPT | Performed by: FAMILY MEDICINE

## 2018-10-04 PROCEDURE — G8926 SPIRO NO PERF OR DOC: HCPCS | Performed by: FAMILY MEDICINE

## 2018-10-04 PROCEDURE — G8427 DOCREV CUR MEDS BY ELIG CLIN: HCPCS | Performed by: FAMILY MEDICINE

## 2018-10-04 PROCEDURE — 4040F PNEUMOC VAC/ADMIN/RCVD: CPT | Performed by: FAMILY MEDICINE

## 2018-10-04 PROCEDURE — 1101F PT FALLS ASSESS-DOCD LE1/YR: CPT | Performed by: FAMILY MEDICINE

## 2018-10-04 PROCEDURE — G8420 CALC BMI NORM PARAMETERS: HCPCS | Performed by: FAMILY MEDICINE

## 2018-10-04 PROCEDURE — 3017F COLORECTAL CA SCREEN DOC REV: CPT | Performed by: FAMILY MEDICINE

## 2018-10-04 PROCEDURE — 1036F TOBACCO NON-USER: CPT | Performed by: FAMILY MEDICINE

## 2018-10-04 PROCEDURE — G8399 PT W/DXA RESULTS DOCUMENT: HCPCS | Performed by: FAMILY MEDICINE

## 2018-10-04 PROCEDURE — 1123F ACP DISCUSS/DSCN MKR DOCD: CPT | Performed by: FAMILY MEDICINE

## 2018-10-04 ASSESSMENT — PATIENT HEALTH QUESTIONNAIRE - PHQ9
SUM OF ALL RESPONSES TO PHQ9 QUESTIONS 1 & 2: 0
2. FEELING DOWN, DEPRESSED OR HOPELESS: 0
SUM OF ALL RESPONSES TO PHQ QUESTIONS 1-9: 0
1. LITTLE INTEREST OR PLEASURE IN DOING THINGS: 0
SUM OF ALL RESPONSES TO PHQ QUESTIONS 1-9: 0

## 2018-10-19 ENCOUNTER — HOSPITAL ENCOUNTER (OUTPATIENT)
Dept: CT IMAGING | Age: 68
Discharge: HOME | End: 2018-10-19
Payer: MEDICARE

## 2018-10-19 DIAGNOSIS — Z87.891 FORMER TOBACCO USE: ICD-10-CM

## 2018-10-19 PROCEDURE — 71250 CT THORAX DX C-: CPT

## 2019-03-13 ENCOUNTER — OFFICE VISIT (OUTPATIENT)
Dept: NEUROLOGY | Age: 69
End: 2019-03-13
Payer: MEDICARE

## 2019-03-13 VITALS
DIASTOLIC BLOOD PRESSURE: 66 MMHG | WEIGHT: 114 LBS | SYSTOLIC BLOOD PRESSURE: 95 MMHG | BODY MASS INDEX: 20.85 KG/M2 | HEART RATE: 66 BPM

## 2019-03-13 DIAGNOSIS — G25.0 BENIGN ESSENTIAL TREMOR: ICD-10-CM

## 2019-03-13 PROCEDURE — G8420 CALC BMI NORM PARAMETERS: HCPCS | Performed by: PSYCHIATRY & NEUROLOGY

## 2019-03-13 PROCEDURE — 1101F PT FALLS ASSESS-DOCD LE1/YR: CPT | Performed by: PSYCHIATRY & NEUROLOGY

## 2019-03-13 PROCEDURE — 1036F TOBACCO NON-USER: CPT | Performed by: PSYCHIATRY & NEUROLOGY

## 2019-03-13 PROCEDURE — 1090F PRES/ABSN URINE INCON ASSESS: CPT | Performed by: PSYCHIATRY & NEUROLOGY

## 2019-03-13 PROCEDURE — 99213 OFFICE O/P EST LOW 20 MIN: CPT | Performed by: PSYCHIATRY & NEUROLOGY

## 2019-03-13 PROCEDURE — 3017F COLORECTAL CA SCREEN DOC REV: CPT | Performed by: PSYCHIATRY & NEUROLOGY

## 2019-03-13 PROCEDURE — G8427 DOCREV CUR MEDS BY ELIG CLIN: HCPCS | Performed by: PSYCHIATRY & NEUROLOGY

## 2019-03-13 PROCEDURE — G8399 PT W/DXA RESULTS DOCUMENT: HCPCS | Performed by: PSYCHIATRY & NEUROLOGY

## 2019-03-13 PROCEDURE — 1123F ACP DISCUSS/DSCN MKR DOCD: CPT | Performed by: PSYCHIATRY & NEUROLOGY

## 2019-03-13 PROCEDURE — 4040F PNEUMOC VAC/ADMIN/RCVD: CPT | Performed by: PSYCHIATRY & NEUROLOGY

## 2019-03-13 PROCEDURE — G8484 FLU IMMUNIZE NO ADMIN: HCPCS | Performed by: PSYCHIATRY & NEUROLOGY

## 2019-03-13 RX ORDER — PRIMIDONE 50 MG/1
TABLET ORAL
Qty: 270 TABLET | Refills: 1 | Status: SHIPPED | OUTPATIENT
Start: 2019-03-13 | End: 2019-12-03 | Stop reason: SDUPTHER

## 2019-04-01 ENCOUNTER — TELEPHONE (OUTPATIENT)
Dept: FAMILY MEDICINE CLINIC | Age: 69
End: 2019-04-01

## 2019-04-01 DIAGNOSIS — E55.9 VITAMIN D DEFICIENCY: ICD-10-CM

## 2019-04-01 DIAGNOSIS — R53.82 CHRONIC FATIGUE: ICD-10-CM

## 2019-04-01 DIAGNOSIS — Z00.00 WELL ADULT EXAM: Primary | ICD-10-CM

## 2019-04-10 ENCOUNTER — ANESTHESIA EVENT (OUTPATIENT)
Dept: ENDOSCOPY | Age: 69
End: 2019-04-10
Payer: MEDICARE

## 2019-04-17 ENCOUNTER — TELEPHONE (OUTPATIENT)
Dept: FAMILY MEDICINE CLINIC | Age: 69
End: 2019-04-17

## 2019-04-26 DIAGNOSIS — E55.9 VITAMIN D DEFICIENCY: ICD-10-CM

## 2019-04-26 DIAGNOSIS — R53.82 CHRONIC FATIGUE: ICD-10-CM

## 2019-04-26 DIAGNOSIS — Z00.00 WELL ADULT EXAM: ICD-10-CM

## 2019-04-26 LAB
A/G RATIO: 1.4 (ref 1.1–2.2)
ALBUMIN SERPL-MCNC: 4 G/DL (ref 3.4–5)
ALP BLD-CCNC: 59 U/L (ref 40–129)
ALT SERPL-CCNC: 9 U/L (ref 10–40)
ANION GAP SERPL CALCULATED.3IONS-SCNC: 9 MMOL/L (ref 3–16)
AST SERPL-CCNC: 10 U/L (ref 15–37)
BASOPHILS ABSOLUTE: 0 K/UL (ref 0–0.2)
BASOPHILS RELATIVE PERCENT: 0.7 %
BILIRUB SERPL-MCNC: 0.5 MG/DL (ref 0–1)
BUN BLDV-MCNC: 14 MG/DL (ref 7–20)
CALCIUM SERPL-MCNC: 9.4 MG/DL (ref 8.3–10.6)
CHLORIDE BLD-SCNC: 105 MMOL/L (ref 99–110)
CO2: 27 MMOL/L (ref 21–32)
CREAT SERPL-MCNC: 0.6 MG/DL (ref 0.6–1.2)
EOSINOPHILS ABSOLUTE: 0 K/UL (ref 0–0.6)
EOSINOPHILS RELATIVE PERCENT: 0.8 %
ESTIMATED AVERAGE GLUCOSE: 102.5 MG/DL
GFR AFRICAN AMERICAN: >60
GFR NON-AFRICAN AMERICAN: >60
GLOBULIN: 2.8 G/DL
GLUCOSE BLD-MCNC: 88 MG/DL (ref 70–99)
HBA1C MFR BLD: 5.2 %
HCT VFR BLD CALC: 45.2 % (ref 36–48)
HEMOGLOBIN: 15.1 G/DL (ref 12–16)
LYMPHOCYTES ABSOLUTE: 1.6 K/UL (ref 1–5.1)
LYMPHOCYTES RELATIVE PERCENT: 27.5 %
MCH RBC QN AUTO: 32.9 PG (ref 26–34)
MCHC RBC AUTO-ENTMCNC: 33.4 G/DL (ref 31–36)
MCV RBC AUTO: 98.7 FL (ref 80–100)
MONOCYTES ABSOLUTE: 0.3 K/UL (ref 0–1.3)
MONOCYTES RELATIVE PERCENT: 5 %
NEUTROPHILS ABSOLUTE: 3.8 K/UL (ref 1.7–7.7)
NEUTROPHILS RELATIVE PERCENT: 66 %
PDW BLD-RTO: 13.2 % (ref 12.4–15.4)
PLATELET # BLD: 273 K/UL (ref 135–450)
PMV BLD AUTO: 8.3 FL (ref 5–10.5)
POTASSIUM SERPL-SCNC: 4.5 MMOL/L (ref 3.5–5.1)
RBC # BLD: 4.58 M/UL (ref 4–5.2)
SODIUM BLD-SCNC: 141 MMOL/L (ref 136–145)
TOTAL PROTEIN: 6.8 G/DL (ref 6.4–8.2)
TSH REFLEX: 1.25 UIU/ML (ref 0.27–4.2)
VITAMIN D 25-HYDROXY: 32.1 NG/ML
WBC # BLD: 5.7 K/UL (ref 4–11)

## 2019-04-29 ENCOUNTER — OFFICE VISIT (OUTPATIENT)
Dept: FAMILY MEDICINE CLINIC | Age: 69
End: 2019-04-29
Payer: MEDICARE

## 2019-04-29 VITALS
BODY MASS INDEX: 20.72 KG/M2 | SYSTOLIC BLOOD PRESSURE: 120 MMHG | HEART RATE: 68 BPM | HEIGHT: 62 IN | TEMPERATURE: 97.9 F | DIASTOLIC BLOOD PRESSURE: 70 MMHG | OXYGEN SATURATION: 98 % | WEIGHT: 112.6 LBS

## 2019-04-29 DIAGNOSIS — E78.2 MIXED HYPERLIPIDEMIA: ICD-10-CM

## 2019-04-29 DIAGNOSIS — H61.22 IMPACTED CERUMEN OF LEFT EAR: ICD-10-CM

## 2019-04-29 DIAGNOSIS — K21.9 GASTROESOPHAGEAL REFLUX DISEASE WITHOUT ESOPHAGITIS: ICD-10-CM

## 2019-04-29 DIAGNOSIS — M81.8 OTHER OSTEOPOROSIS WITHOUT CURRENT PATHOLOGICAL FRACTURE: ICD-10-CM

## 2019-04-29 DIAGNOSIS — Z00.00 ROUTINE GENERAL MEDICAL EXAMINATION AT A HEALTH CARE FACILITY: Primary | ICD-10-CM

## 2019-04-29 DIAGNOSIS — G25.2 INTENTION TREMOR: ICD-10-CM

## 2019-04-29 DIAGNOSIS — J43.8 OTHER EMPHYSEMA (HCC): ICD-10-CM

## 2019-04-29 DIAGNOSIS — Z23 NEED FOR PROPHYLACTIC VACCINATION AGAINST STREPTOCOCCUS PNEUMONIAE (PNEUMOCOCCUS): ICD-10-CM

## 2019-04-29 PROBLEM — M81.0 OSTEOPOROSIS: Status: ACTIVE | Noted: 2019-04-29

## 2019-04-29 PROCEDURE — 4040F PNEUMOC VAC/ADMIN/RCVD: CPT | Performed by: FAMILY MEDICINE

## 2019-04-29 PROCEDURE — G0009 ADMIN PNEUMOCOCCAL VACCINE: HCPCS | Performed by: FAMILY MEDICINE

## 2019-04-29 PROCEDURE — G0439 PPPS, SUBSEQ VISIT: HCPCS | Performed by: FAMILY MEDICINE

## 2019-04-29 PROCEDURE — 90732 PPSV23 VACC 2 YRS+ SUBQ/IM: CPT | Performed by: FAMILY MEDICINE

## 2019-04-29 PROCEDURE — 1123F ACP DISCUSS/DSCN MKR DOCD: CPT | Performed by: FAMILY MEDICINE

## 2019-04-29 PROCEDURE — 69210 REMOVE IMPACTED EAR WAX UNI: CPT | Performed by: FAMILY MEDICINE

## 2019-04-29 PROCEDURE — 3017F COLORECTAL CA SCREEN DOC REV: CPT | Performed by: FAMILY MEDICINE

## 2019-04-29 RX ORDER — PROPRANOLOL HYDROCHLORIDE 10 MG/1
TABLET ORAL
Qty: 270 TABLET | Refills: 3 | Status: SHIPPED | OUTPATIENT
Start: 2019-04-29 | End: 2019-09-09

## 2019-04-29 RX ORDER — LIDOCAINE 50 MG/G
1 PATCH TOPICAL DAILY
Qty: 90 PATCH | Refills: 3 | Status: SHIPPED | OUTPATIENT
Start: 2019-04-29 | End: 2022-03-30

## 2019-04-29 RX ORDER — SUMATRIPTAN 100 MG/1
TABLET, FILM COATED ORAL
Qty: 9 TABLET | Refills: 2 | Status: SHIPPED | OUTPATIENT
Start: 2019-04-29 | End: 2019-12-10 | Stop reason: SDUPTHER

## 2019-04-29 RX ORDER — ALENDRONATE SODIUM 70 MG/1
70 TABLET ORAL
Qty: 13 TABLET | Refills: 3 | Status: SHIPPED | OUTPATIENT
Start: 2019-04-29 | End: 2019-12-10

## 2019-04-29 RX ORDER — TOPIRAMATE 25 MG/1
25 TABLET ORAL 2 TIMES DAILY
Qty: 180 TABLET | Refills: 3 | Status: SHIPPED | OUTPATIENT
Start: 2019-04-29 | End: 2019-12-10

## 2019-04-29 ASSESSMENT — PATIENT HEALTH QUESTIONNAIRE - PHQ9
SUM OF ALL RESPONSES TO PHQ QUESTIONS 1-9: 0
SUM OF ALL RESPONSES TO PHQ QUESTIONS 1-9: 0

## 2019-04-29 ASSESSMENT — LIFESTYLE VARIABLES
AUDIT TOTAL SCORE: 2
HOW OFTEN DURING THE LAST YEAR HAVE YOU NEEDED AN ALCOHOLIC DRINK FIRST THING IN THE MORNING TO GET YOURSELF GOING AFTER A NIGHT OF HEAVY DRINKING: 0
HOW MANY STANDARD DRINKS CONTAINING ALCOHOL DO YOU HAVE ON A TYPICAL DAY: 0
HOW OFTEN DURING THE LAST YEAR HAVE YOU HAD A FEELING OF GUILT OR REMORSE AFTER DRINKING: 0
HOW OFTEN DURING THE LAST YEAR HAVE YOU FAILED TO DO WHAT WAS NORMALLY EXPECTED FROM YOU BECAUSE OF DRINKING: 0
HAVE YOU OR SOMEONE ELSE BEEN INJURED AS A RESULT OF YOUR DRINKING: 0
HAS A RELATIVE, FRIEND, DOCTOR, OR ANOTHER HEALTH PROFESSIONAL EXPRESSED CONCERN ABOUT YOUR DRINKING OR SUGGESTED YOU CUT DOWN: 0
HOW OFTEN DO YOU HAVE SIX OR MORE DRINKS ON ONE OCCASION: 0
HOW OFTEN DURING THE LAST YEAR HAVE YOU FOUND THAT YOU WERE NOT ABLE TO STOP DRINKING ONCE YOU HAD STARTED: 0
AUDIT-C TOTAL SCORE: 2
HOW OFTEN DO YOU HAVE A DRINK CONTAINING ALCOHOL: 2
HOW OFTEN DURING THE LAST YEAR HAVE YOU BEEN UNABLE TO REMEMBER WHAT HAPPENED THE NIGHT BEFORE BECAUSE YOU HAD BEEN DRINKING: 0

## 2019-04-29 ASSESSMENT — ANXIETY QUESTIONNAIRES: GAD7 TOTAL SCORE: 1

## 2019-04-29 NOTE — PROGRESS NOTES
Medicare Annual Wellness Visit  Name: Dionne Spine Date: 2019   MRN: [id number] Sex: Female   Age: 77 y.o. Ethnicity: Non-/Non    : 1950 Race: Aundra Cam is here for Medicare AWV    Screenings for behavioral, psychosocial and functional/safety risks, and cognitive dysfunction are all negative except as indicated below. These results, as well as other patient data from the [address] [facility] ([address] form, are documented in Flowsheets linked to this Encounter. Allergies   Allergen Reactions    Montelukast Sodium Other (See Comments)     Vision issues     Prior to Visit Medications    Medication Sig Taking? Authorizing Provider   primidone (MYSOLINE) 50 MG tablet Take 1 tablet by mouth 3 times daily Yes Yariel Castro MD   alendronate (FOSAMAX) 70 MG tablet Take 1 tablet by mouth every 7 days Yes Mandie Arevalo MD   topiramate (TOPAMAX) 25 MG tablet Take 1 tablet by mouth 2 times daily Yes Mandie Arevalo MD   Cholecalciferol (VITAMIN D PO) Take by mouth Yes Historical Provider, MD   Ascorbic Acid (VITAMIN C PO) Take by mouth Yes Historical Provider, MD   SUMAtriptan (IMITREX) 100 MG tablet TAKE 1 TABLET BY MOUTH FOR 1 DOSE AS NEEDED FOR MIGRAINE Yes Joya Ferrell MD   propranolol (INDERAL) 10 MG tablet TAKE 1 TABLET BY MOUTH THREE TIMES DAILY  Mandie Arevalo MD     Past Medical History:   Diagnosis Date    Acne     [facility]     Cellulitis     COPD (chronic obstructive pulmonary disease) [facility]     GERD (gastroesophageal reflux disease)     Hyperlipidemia     Osteoporosis     Polyp of colon      Past Surgical History:   Procedure Laterality Date    APPENDECTOMY      BREAST SURGERY      DILATION AND CURETTAGE OF UTERUS      EYE SURGERY Right 3/21/15    macular \"hole\"    HAND SURGERY       No family history on file.     CareTeam (Including outside providers/suppliers regularly involved in providing care):   Patient Care Team:  Mathew Dumont MD as PCP - Tomasa Murray MD as PCP - S Attributed Provider  Rox Lindsay MD as Surgeon (General Surgery)  Neuro Lunoah Suero)  GI - colon this week  Wt Readings from Last 3 Encounters:   04/29/19 112 lb 9.6 oz (51.1 kg)   03/13/19 114 lb (51.7 kg)   10/04/18 109 lb (49.4 kg)     Vitals:    04/29/19 1330   BP: 120/70   Pulse: 68   Temp: 97.9 °F (36.6 °C)   TempSrc: Tympanic   SpO2: 98%   Weight: 112 lb 9.6 oz (51.1 kg)   Height: 5' 2.01\" (1.575 m)     Body mass index is 20.59 kg/m². Based upon direct observation of the patient, evaluation of cognition reveals recent and remote memory intact. General Appearance: alert and oriented to person, place and time, well-developed and well-nourished, in no acute distress  ENT: tympanic membrane, external ear and ear canal normal bilaterally, oropharynx clear and moist with normal mucous membranes. Ear wax left ear, dec hearing  Neck: neck supple and non tender without mass, no thyromegaly or thyroid nodules, no cervical lymphadenopathy   Pulmonary/Chest: clear to auscultation bilaterally- no wheezes, rales or rhonchi, normal air movement, no respiratory distress. Dec bs  Cardiovascular: normal rate, normal S1 and S2, no gallops, intact distal pulses and no carotid bruits  Abdomen: soft, non-tender, non-distended, normal bowel sounds, no masses or organomegaly  Extremities: no cyanosis and no clubbing  Neuro: Oriented ×3 normal gait normal speech normal significant intention tremor noted  Patient's complete Health Risk Assessment and screening values have been reviewed and are found in Flowsheets. The following problems were reviewed today and where indicated follow up appointments were made and/or referrals ordered.     Positive Risk Factor Screenings with Interventions:     Health Habits/Nutrition:  Health Habits/Nutrition  Do you exercise for at least 20 minutes 2-3 times per week?: Yes  Have you lost any weight without trying in the past 3 months?: (!) Yes  Do you eat fewer than 2 meals per day?: No  Have you seen a dentist within the past year?: Yes  Body mass index is 20.59 kg/m². Health Habits/Nutrition Interventions:  · Inadequate physical activity:  patient agrees to exercise for at least 150 minutes/week    Hearing/Vision:  Hearing/Vision  Do you or your family notice any trouble with your hearing?: (!) Yes(left ear)  Do you have difficulty driving, watching TV, or doing any of your daily activities because of your eyesight?: No  Have you had an eye exam within the past year?: Yes  Hearing/Vision Interventions:  · Hearing concerns:  ear was removal    ADL:  ADLs  In the past 7 days, did you need help from others to perform any of the following everyday activities? Eating, dressing, grooming, bathing, toileting, or walking/balance?: None  In the past 7 days, did you need help from others to take care of any of the following?  Laundry, housekeeping, banking/finances, shopping, telephone use, food preparation, transportation, or taking medications?: (!) Food Preparation, Housekeeping  ADL Interventions:  · Patient declines any further evaluation/treatment for this issue    Personalized Preventive Plan   Current Health Maintenance Status  Immunization History   Administered Date(s) Administered    Influenza Virus Vaccine 09/16/2011    Influenza, High Dose (Fluzone 65 yrs and older) 01/06/2016    Influenza, Intradermal, Preservative free 09/12/2012    PPD Test 03/14/2011    Pneumococcal 13-valent Conjugate (Cpnrbnd19) 01/06/2016    Pneumococcal Polysaccharide (Ujtwucecr55) 09/12/2012        Health Maintenance   Topic Date Due    DTaP/Tdap/Td vaccine (1 - Tdap) 12/15/1967    Breast cancer screen  12/15/1998    Shingles Vaccine (1 of 2) 12/15/1998    Pneumococcal 65+ years Vaccine (2 of 2 - PPSV23) 09/12/2017    Colon cancer screen colonoscopy  05/01/2019    Flu vaccine (Season Ended) 09/01/2019    Low dose CT lung screening  10/19/2019    Lipid screen  09/20/2022    DEXA (modify frequency per FRAX score)  Completed    Hepatitis C screen  Completed     This SmartLink has not been configured with any valid records. Lab Results   Component Value Date    WBC 5.7 04/26/2019    HGB 15.1 04/26/2019    HCT 45.2 04/26/2019    MCV 98.7 04/26/2019     04/26/2019     Lab Results   Component Value Date     04/26/2019    K 4.5 04/26/2019     04/26/2019    CO2 27 04/26/2019    BUN 14 04/26/2019    CREATININE 0.6 04/26/2019    GLUCOSE 88 04/26/2019    CALCIUM 9.4 04/26/2019    PROT 6.8 04/26/2019    LABALBU 4.0 04/26/2019    BILITOT 0.5 04/26/2019    ALKPHOS 59 04/26/2019    AST 10 (L) 04/26/2019    ALT 9 (L) 04/26/2019    LABGLOM >60 04/26/2019    GFRAA >60 04/26/2019    AGRATIO 1.4 04/26/2019    GLOB 2.8 04/26/2019     Lab Results   Component Value Date    TSH 1.14 09/20/2017     Lab Results   Component Value Date    CHOL 184 09/20/2017    CHOL 204 (H) 09/06/2012     Lab Results   Component Value Date    TRIG 78 09/20/2017    TRIG 78 09/06/2012     Lab Results   Component Value Date    HDL 66 (H) 09/20/2017    HDL 64 (H) 09/06/2012     Lab Results   Component Value Date    LDLCALC 102 (H) 09/20/2017    LDLCALC 124 (H) 09/06/2012     Lab Results   Component Value Date    LABVLDL 16 09/20/2017    LABVLDL 16 09/06/2012     No results found for: CHOLHDLRATIO  Recommendations for Preventive Services Due: see orders and patient instructions/AVS.  . Recommended screening schedule for the next 5-10 years is provided to the patient in written form: see Patient Instructions/AVS.  Encounter Diagnoses   Name Primary?     Routine general medical examination at a health care facility Yes    Mixed hyperlipidemia Stable off medication     Gastroesophageal reflux disease without esophagitis Stable     Other osteoporosis without current pathological fracture dexa - 2020   Refill meds  Handicap -placard due to tremor  Vit d defic-much improved continue supplementation  Ear wax occlusion - irrigate left only, ear wax came out easily and hearing was restored  shingrix at pharmacy when available  Pneumovax booster today  Irrigate  Left ear  pulm nodules, copd, ?abestis pleural plague  - repeat chest ct 10/19

## 2019-04-29 NOTE — PATIENT INSTRUCTIONS
Personalized Preventive Plan for Dayana Harman - 4/29/2019  Medicare offers a range of preventive health benefits. Some of the tests and screenings are paid in full while other may be subject to a deductible, co-insurance, and/or copay. Some of these benefits include a comprehensive review of your medical history including lifestyle, illnesses that may run in your family, and various assessments and screenings as appropriate. After reviewing your medical record and screening and assessments performed today your provider may have ordered immunizations, labs, imaging, and/or referrals for you. A list of these orders (if applicable) as well as your Preventive Care list are included within your After Visit Summary for your review. Other Preventive Recommendations:    · A preventive eye exam performed by an eye specialist is recommended every 1-2 years to screen for glaucoma; cataracts, macular degeneration, and other eye disorders. · A preventive dental visit is recommended every 6 months. · Try to get at least 150 minutes of exercise per week or 10,000 steps per day on a pedometer . · Order or download the FREE \"Exercise & Physical Activity: Your Everyday Guide\" from [employer]. Call 9-664.978.5253 or search [employer] online. · You need 0102-1741 mg of calcium and 9647-1397 IU of vitamin D per day. It is possible to meet your calcium requirement with diet alone, but a vitamin D supplement is usually necessary to meet this goal.  · When exposed to the sun, use a sunscreen that protects against both UVA and UVB radiation with an SPF of 30 or greater. Reapply every 2 to 3 hours or after sweating, drying off with a towel, or swimming. · Always wear a seat belt when traveling in a car. Always wear a helmet when riding a bicycle or motorcycle.

## 2019-05-02 ENCOUNTER — HOSPITAL ENCOUNTER (OUTPATIENT)
Age: 69
Setting detail: OUTPATIENT SURGERY
Discharge: HOME | End: 2019-05-02
Attending: INTERNAL MEDICINE | Admitting: INTERNAL MEDICINE
Payer: MEDICARE

## 2019-05-02 ENCOUNTER — ANESTHESIA (OUTPATIENT)
Dept: ENDOSCOPY | Age: 69
End: 2019-05-02
Payer: MEDICARE

## 2019-05-02 VITALS
SYSTOLIC BLOOD PRESSURE: 101 MMHG | RESPIRATION RATE: 14 BRPM | DIASTOLIC BLOOD PRESSURE: 66 MMHG | TEMPERATURE: 97.6 F | HEART RATE: 58 BPM | OXYGEN SATURATION: 100 %

## 2019-05-02 VITALS
SYSTOLIC BLOOD PRESSURE: 95 MMHG | RESPIRATION RATE: 11 BRPM | DIASTOLIC BLOOD PRESSURE: 56 MMHG | OXYGEN SATURATION: 100 %

## 2019-05-02 PROBLEM — K59.00 CONSTIPATION: Chronic | Status: ACTIVE | Noted: 2019-05-02

## 2019-05-02 PROCEDURE — 7100000011 HC PHASE II RECOVERY - ADDTL 15 MIN: Performed by: INTERNAL MEDICINE

## 2019-05-02 PROCEDURE — 2500000003 HC RX 250 WO HCPCS: Performed by: NURSE ANESTHETIST, CERTIFIED REGISTERED

## 2019-05-02 PROCEDURE — 7100000010 HC PHASE II RECOVERY - FIRST 15 MIN: Performed by: INTERNAL MEDICINE

## 2019-05-02 PROCEDURE — 3609010600 HC COLONOSCOPY POLYPECTOMY SNARE/COLD BIOPSY: Performed by: INTERNAL MEDICINE

## 2019-05-02 PROCEDURE — 3700000000 HC ANESTHESIA ATTENDED CARE: Performed by: INTERNAL MEDICINE

## 2019-05-02 PROCEDURE — 3700000001 HC ADD 15 MINUTES (ANESTHESIA): Performed by: INTERNAL MEDICINE

## 2019-05-02 PROCEDURE — 6370000000 HC RX 637 (ALT 250 FOR IP): Performed by: INTERNAL MEDICINE

## 2019-05-02 PROCEDURE — 6360000002 HC RX W HCPCS: Performed by: NURSE ANESTHETIST, CERTIFIED REGISTERED

## 2019-05-02 PROCEDURE — 2709999900 HC NON-CHARGEABLE SUPPLY: Performed by: INTERNAL MEDICINE

## 2019-05-02 PROCEDURE — 2580000003 HC RX 258: Performed by: FAMILY MEDICINE

## 2019-05-02 PROCEDURE — 2500000003 HC RX 250 WO HCPCS: Performed by: INTERNAL MEDICINE

## 2019-05-02 PROCEDURE — 88305 TISSUE EXAM BY PATHOLOGIST: CPT

## 2019-05-02 RX ORDER — SODIUM CHLORIDE 0.9 % (FLUSH) 0.9 %
10 SYRINGE (ML) INJECTION PRN
Status: DISCONTINUED | OUTPATIENT
Start: 2019-05-02 | End: 2019-05-02 | Stop reason: HOSPADM

## 2019-05-02 RX ORDER — SODIUM CHLORIDE 0.9 % (FLUSH) 0.9 %
10 SYRINGE (ML) INJECTION EVERY 12 HOURS SCHEDULED
Status: DISCONTINUED | OUTPATIENT
Start: 2019-05-02 | End: 2019-05-02 | Stop reason: HOSPADM

## 2019-05-02 RX ORDER — LIDOCAINE HYDROCHLORIDE 20 MG/ML
INJECTION, SOLUTION INFILTRATION; PERINEURAL PRN
Status: DISCONTINUED | OUTPATIENT
Start: 2019-05-02 | End: 2019-05-02 | Stop reason: SDUPTHER

## 2019-05-02 RX ORDER — SODIUM CHLORIDE 9 MG/ML
INJECTION, SOLUTION INTRAVENOUS CONTINUOUS
Status: DISCONTINUED | OUTPATIENT
Start: 2019-05-02 | End: 2019-05-02 | Stop reason: HOSPADM

## 2019-05-02 RX ORDER — M-VIT,TX,IRON,MINS/CALC/FOLIC 27MG-0.4MG
1 TABLET ORAL DAILY
COMMUNITY
End: 2019-12-10

## 2019-05-02 RX ORDER — PROPOFOL 10 MG/ML
INJECTION, EMULSION INTRAVENOUS PRN
Status: DISCONTINUED | OUTPATIENT
Start: 2019-05-02 | End: 2019-05-02 | Stop reason: SDUPTHER

## 2019-05-02 RX ADMIN — PROPOFOL 50 MG: 10 INJECTION, EMULSION INTRAVENOUS at 07:56

## 2019-05-02 RX ADMIN — PROPOFOL 50 MG: 10 INJECTION, EMULSION INTRAVENOUS at 07:42

## 2019-05-02 RX ADMIN — PROPOFOL 40 MG: 10 INJECTION, EMULSION INTRAVENOUS at 07:08

## 2019-05-02 RX ADMIN — PROPOFOL 40 MG: 10 INJECTION, EMULSION INTRAVENOUS at 07:19

## 2019-05-02 RX ADMIN — PROPOFOL 40 MG: 10 INJECTION, EMULSION INTRAVENOUS at 07:14

## 2019-05-02 RX ADMIN — PROPOFOL 510 MG: 10 INJECTION, EMULSION INTRAVENOUS at 08:01

## 2019-05-02 RX ADMIN — PROPOFOL 40 MG: 10 INJECTION, EMULSION INTRAVENOUS at 06:58

## 2019-05-02 RX ADMIN — PROPOFOL 40 MG: 10 INJECTION, EMULSION INTRAVENOUS at 07:28

## 2019-05-02 RX ADMIN — PROPOFOL 30 MG: 10 INJECTION, EMULSION INTRAVENOUS at 07:00

## 2019-05-02 RX ADMIN — LIDOCAINE HYDROCHLORIDE 100 MG: 20 INJECTION, SOLUTION INFILTRATION; PERINEURAL at 07:03

## 2019-05-02 RX ADMIN — SODIUM CHLORIDE: 9 INJECTION, SOLUTION INTRAVENOUS at 07:03

## 2019-05-02 RX ADMIN — PROPOFOL 40 MG: 10 INJECTION, EMULSION INTRAVENOUS at 07:35

## 2019-05-02 RX ADMIN — PROPOFOL 50 MG: 10 INJECTION, EMULSION INTRAVENOUS at 07:49

## 2019-05-02 ASSESSMENT — PAIN - FUNCTIONAL ASSESSMENT: PAIN_FUNCTIONAL_ASSESSMENT: 0-10

## 2019-05-02 ASSESSMENT — ENCOUNTER SYMPTOMS: SHORTNESS OF BREATH: 0

## 2019-05-02 ASSESSMENT — PAIN SCALES - GENERAL: PAINLEVEL_OUTOF10: 0

## 2019-05-02 NOTE — ANESTHESIA PRE PROCEDURE
Department of Anesthesiology  Preprocedure Note       Name:  Josi Valera   Age:  77 y.o.  :  1950                                          MRN:  [id number]         Date:  2019      Surgeon: Danielle Pierre):  Kun Holcomb MD    Procedure: COLONOSCOPY (N/A )    Medications prior to admission:   Prior to Admission medications    Medication Sig Start Date End Date Taking? Authorizing Provider   Multiple Vitamins-Minerals (THERAPEUTIC MULTIVITAMIN-MINERALS) tablet Take 1 tablet by mouth daily   Yes Historical Provider, MD   SUMAtriptan (IMITREX) 100 MG tablet TAKE 1 TABLET BY MOUTH FOR 1 DOSE AS NEEDED FOR MIGRAINE 19   Ema Murray MD   lidocaine (LIDODERM) 5 % Place 1 patch onto the skin daily 12 hours on, 12 hours off. 19   Ema Murray MD   alendronate (FOSAMAX) 70 MG tablet Take 1 tablet by mouth every 7 days 19   Ema Murray MD   topiramate (TOPAMAX) 25 MG tablet Take 1 tablet by mouth 2 times daily 19   Ema Murray MD   propranolol (INDERAL) 10 MG tablet TAKE 1 TABLET BY MOUTH THREE TIMES DAILY 19   Ema Murray MD   primidone (MYSOLINE) 50 MG tablet Take 1 tablet by mouth 3 times daily 3/13/19   Sumi Quintero MD   Cholecalciferol (VITAMIN D PO) Take by mouth    Historical Provider, MD   Ascorbic Acid (VITAMIN C PO) Take by mouth    Historical Provider, MD       Current medications:    Current Facility-Administered Medications   Medication Dose Route Frequency Provider Last Rate Last Dose    0.9 % sodium chloride infusion   Intravenous Continuous Suad Cristobal MD        sodium chloride flush 0.9 % injection 10 mL  10 mL Intravenous 2 times per day Suad Cristobal MD        sodium chloride flush 0.9 % injection 10 mL  10 mL Intravenous PRN Suad Cristobal MD           Allergies: Allergies   Allergen Reactions    Other Shortness Of Breath     Pain Med - doesn't know name.     Montelukast Sodium Other (See Comments)     Vision issues Problem List:    Patient Active Problem List   Diagnosis Code    Hyperlipidemia E78.5    Polyp of colon K63.5    [facility]chronic obstructive [facility] disease) ([email or web address] J44.9    Pleural plaque with presence of asbestos J92.0    GERD (gastroesophageal reflux disease) K21.9    Hip mass R22.40    Osteoporosis M81.0    Intention tremor G25.2       Past Medical History:        Diagnosis Date    Acne     Breast cancer ([email or web address]     Cellulitis     COPD (chronic obstructive pulmonary disease) ([email or web address]     GERD (gastroesophageal reflux disease)     Hyperlipidemia     Osteoporosis     Polyp of colon        Past Surgical History:        Procedure Laterality Date    APPENDECTOMY      BREAST SURGERY      DILATION AND CURETTAGE OF UTERUS      EYE SURGERY Right 3/21/15    macular \"hole\"    HAND SURGERY      MASTECTOMY, BILATERAL Bilateral        Social History:    Social History     Tobacco Use    Smoking status: Former Smoker     Packs/day: 1.00     Years: 30.00     Pack years: 30.00     Types: Cigarettes     Last attempt to quit: 3/1/2010     Years since quittin.1    Smokeless tobacco: Never Used    Tobacco comment: H.O.smoking 1 p.p.d x 30+ yrs / Quit 3/1/2010   Substance Use Topics    Alcohol use: Yes     Comment: 1 beer/mo.                                 Counseling given: Not Answered  Comment: H.O.smoking 1 p.p.d x 30+ yrs / Quit 3/1/2010      Vital Signs (Current):   Vitals:    19 0546   BP: 112/82   Pulse: 83   Resp: 16   Temp: 96.9 °F (36.1 °C)   TempSrc: Temporal   SpO2: 100%                                              BP Readings from Last 3 Encounters:   19 112/82   19 120/70   19 95/66       NPO Status: Time of last liquid consumption: 0100                        Time of last solid consumption: 1700                        Date of last liquid consumption: 19                        Date of last solid food consumption: 19    BMI:   Wt Readings from Last 3 Encounters:   04/29/19 112 lb 9.6 oz (51.1 kg)   03/13/19 114 lb (51.7 kg)   10/04/18 109 lb (49.4 kg)     There is no height or weight on file to calculate BMI.    CBC:   Lab Results   Component Value Date    WBC 5.7 04/26/2019    RBC 4.58 04/26/2019    HGB 15.1 04/26/2019    HCT 45.2 04/26/2019    MCV 98.7 04/26/2019    RDW 13.2 04/26/2019     04/26/2019       CMP:   Lab Results   Component Value Date     04/26/2019    K 4.5 04/26/2019     04/26/2019    CO2 27 04/26/2019    BUN 14 04/26/2019    CREATININE 0.6 04/26/2019    GFRAA >60 04/26/2019    GFRAA >60 09/06/2012    AGRATIO 1.4 04/26/2019    LABGLOM >60 04/26/2019    GLUCOSE 88 04/26/2019    PROT 6.8 04/26/2019    PROT 7.5 09/06/2012    CALCIUM 9.4 04/26/2019    BILITOT 0.5 04/26/2019    ALKPHOS 59 04/26/2019    AST 10 04/26/2019    ALT 9 04/26/2019       POC Tests: No results for input(s): POCGLU, POCNA, POCK, POCCL, POCBUN, POCHEMO, POCHCT in the last 72 hours. Coags: No results found for: PROTIME, INR, APTT    HCG (If Applicable): No results found for: PREGTESTUR, PREGSERUM, HCG, HCGQUANT     ABGs: No results found for: PHART, PO2ART, NCS1JLB, ABO0FXR, BEART, A1RHQHVC     Type & Screen (If Applicable):  No results found for: LABABO, [address]    Anesthesia Evaluation  Patient summary reviewed and Nursing notes reviewed no history of anesthetic complications:   Airway: Mallampati: II  TM distance: >3 FB   Neck ROM: full  Mouth opening: > = 3 FB Dental: normal exam         Pulmonary:   (+) COPD:      (-) asthma and shortness of breath                           Cardiovascular:  Exercise tolerance: good (>4 METS),   (+) hyperlipidemia    (-) hypertension and  angina                Neuro/Psych:               GI/Hepatic/Renal:   (+) GERD:,           Endo/Other:    (+) malignancy/cancer.     (-) diabetes mellitus               Abdominal:           Vascular:                                        Anesthesia Plan      MAC     ASA 3       Induction: intravenous. Anesthetic plan and risks discussed with patient. Plan discussed with CRNA. Plan for MAC with standard ASA monitoring. Additional monitoring and lines as dictated by intra-op course, including GETA. Risks/benefits reviewed with patient and all anesthestic questions answered prior to procedure. NPO appropriate.        Ivy Servin MD   5/2/2019

## 2019-05-02 NOTE — ANESTHESIA POSTPROCEDURE EVALUATION
Department of Anesthesiology  Postprocedure Note    Patient: Damian Mott  MRN: [id number]  YOB: 1950  Date of evaluation: 5/2/2019  Time:  9:58 AM     Procedure Summary     Date:  05/02/19 Room / Location:  [facility] ENDO 01 / [facility] ENDOSCOPY    Anesthesia Start:  3453 Anesthesia Stop:  0805    Procedure:  COLONOSCOPY POLYPECTOMY SNARE/COLD BIOPSY (N/A ) Diagnosis:  (HISTORY OF COLON POLYPS Z86.010)    Surgeon:  Tracey Nj MD Responsible Provider:  Glenys Hale MD    Anesthesia Type:  MAC ASA Status:  3          Anesthesia Type: MAC    Noa Phase I: Noa Score: 10    Noa Phase II: Noa Score: 10    Last vitals: Reviewed and per EMR flowsheets.        Anesthesia Post Evaluation    Patient location during evaluation: PACU  Patient participation: complete - patient participated  Level of consciousness: awake and alert  Airway patency: patent  Nausea & Vomiting: no nausea and no vomiting  Complications: no  Cardiovascular status: hemodynamically stable  Respiratory status: acceptable  Hydration status: stable

## 2019-09-06 ENCOUNTER — TELEPHONE (OUTPATIENT)
Dept: ORTHOPEDIC SURGERY | Age: 69
End: 2019-09-06

## 2019-09-06 ENCOUNTER — APPOINTMENT (OUTPATIENT)
Dept: GENERAL RADIOLOGY | Age: 69
End: 2019-09-06
Payer: MEDICARE

## 2019-09-06 ENCOUNTER — HOSPITAL ENCOUNTER (EMERGENCY)
Age: 69
Discharge: HOME | End: 2019-09-06
Payer: MEDICARE

## 2019-09-06 VITALS
HEART RATE: 82 BPM | TEMPERATURE: 97.9 F | BODY MASS INDEX: 20 KG/M2 | SYSTOLIC BLOOD PRESSURE: 132 MMHG | RESPIRATION RATE: 14 BRPM | DIASTOLIC BLOOD PRESSURE: 83 MMHG | WEIGHT: 109.38 LBS | OXYGEN SATURATION: 98 %

## 2019-09-06 DIAGNOSIS — S52.501A CLOSED FRACTURE OF DISTAL END OF RIGHT RADIUS, UNSPECIFIED FRACTURE MORPHOLOGY, INITIAL ENCOUNTER: Primary | ICD-10-CM

## 2019-09-06 PROCEDURE — 99283 EMERGENCY DEPT VISIT LOW MDM: CPT

## 2019-09-06 PROCEDURE — 73090 X-RAY EXAM OF FOREARM: CPT

## 2019-09-06 PROCEDURE — 4500000021 HC ED LEVEL 1 PROCEDURE

## 2019-09-06 PROCEDURE — 73110 X-RAY EXAM OF WRIST: CPT

## 2019-09-06 PROCEDURE — 6370000000 HC RX 637 (ALT 250 FOR IP): Performed by: NURSE PRACTITIONER

## 2019-09-06 RX ORDER — TRAMADOL HYDROCHLORIDE 50 MG/1
50 TABLET ORAL EVERY 6 HOURS PRN
Qty: 20 TABLET | Refills: 0 | Status: SHIPPED | OUTPATIENT
Start: 2019-09-06 | End: 2019-09-06 | Stop reason: SDUPTHER

## 2019-09-06 RX ORDER — TRAMADOL HYDROCHLORIDE 50 MG/1
50 TABLET ORAL ONCE
Status: COMPLETED | OUTPATIENT
Start: 2019-09-06 | End: 2019-09-06

## 2019-09-06 RX ORDER — TRAMADOL HYDROCHLORIDE 50 MG/1
50 TABLET ORAL EVERY 6 HOURS PRN
Qty: 20 TABLET | Refills: 0 | Status: SHIPPED | OUTPATIENT
Start: 2019-09-06 | End: 2019-09-09

## 2019-09-06 RX ADMIN — TRAMADOL HYDROCHLORIDE 50 MG: 50 TABLET, FILM COATED ORAL at 10:57

## 2019-09-06 ASSESSMENT — ENCOUNTER SYMPTOMS
DIARRHEA: 0
ABDOMINAL PAIN: 0
SHORTNESS OF BREATH: 0
NAUSEA: 0
CHEST TIGHTNESS: 0
VOMITING: 0

## 2019-09-06 ASSESSMENT — PAIN DESCRIPTION - LOCATION: LOCATION: WRIST

## 2019-09-06 ASSESSMENT — PAIN DESCRIPTION - PAIN TYPE: TYPE: ACUTE PAIN

## 2019-09-06 ASSESSMENT — PAIN SCALES - GENERAL: PAINLEVEL_OUTOF10: 10

## 2019-09-06 NOTE — TELEPHONE ENCOUNTER
Attempted to reach patient. Patient unavailable and left message. Patient referred to Dr Sanjuanita Hahn for wrist fx. Patient in Cc chart from ED. Per SMA he would like to speak to patient in regards to injury. Patient needs appt for 0900 on 09/10/2019 NPO.  Please schedule accordingly upon return ariana

## 2019-09-06 NOTE — ED PROVIDER NOTES
fracture         XR WRIST RIGHT (MIN 3 VIEWS)   Final Result   Distal radius fracture as above and suspected nondisplaced ulnar styloid   fracture      No definite proximal forearm fracture           Xr Radius Ulna Right (2 Views)    Result Date: 9/6/2019  EXAMINATION: TWO XRAY VIEWS OF THE RIGHT FOREARM; 3 XRAY VIEWS OF THE RIGHT WRIST 9/6/2019 10:07 am; 9/6/2019 9:57 am COMPARISON: None. HISTORY: ORDERING SYSTEM PROVIDED HISTORY: pain after fall TECHNOLOGIST PROVIDED HISTORY: Reason for exam:->pain after fall Reason for Exam: Wrist Pain (pt was chasing after her dog last night and fell on grass. swelling noted to right wrist. pain throughout the arm) Acuity: Acute Type of Exam: Initial FINDINGS: Forearm: Proximal radius and ulna appear intact. Wrist: There is impacted intra-articular fracture of the distal radius, with surrounding soft tissue swelling. There is subtle dorsal displacement and volar angulation. Nondisplaced ulnar styloid fracture is also likely present Degenerative changes are also seen in the carpal bones, greatest at the 1st carpal/metacarpal joint. Distal radius fracture as above and suspected nondisplaced ulnar styloid fracture No definite proximal forearm fracture     Xr Wrist Right (min 3 Views)    Result Date: 9/6/2019  EXAMINATION: TWO XRAY VIEWS OF THE RIGHT FOREARM; 3 XRAY VIEWS OF THE RIGHT WRIST 9/6/2019 10:07 am; 9/6/2019 9:57 am COMPARISON: None. HISTORY: ORDERING SYSTEM PROVIDED HISTORY: pain after fall TECHNOLOGIST PROVIDED HISTORY: Reason for exam:->pain after fall Reason for Exam: Wrist Pain (pt was chasing after her dog last night and fell on grass. swelling noted to right wrist. pain throughout the arm) Acuity: Acute Type of Exam: Initial FINDINGS: Forearm: Proximal radius and ulna appear intact. Wrist: There is impacted intra-articular fracture of the distal radius, with surrounding soft tissue swelling. There is subtle dorsal displacement and volar angulation. dog last night when she accidentally tripped, landing on outstretched wrist.  She does have swelling over the wrist with an aching pain. She denies any mitigating factors, denies previous injury. Patient is right-hand dominant. Denies other injury. She has no numbness or tingling but is complaining of pain that does radiate up into the forearm.        XR RADIUS ULNA RIGHT (2 VIEWS) (Final result)   Result time 09/06/19 10:17:37   Final result by Lizabeth Pérez MD (09/06/19 10:17:37)                Impression:    Distal radius fracture as above and suspected nondisplaced ulnar styloid  fracture    No definite proximal forearm fracture              Splint was applied, checked by me. Ultram given in the ER and prescribed for home. Follow up with ortho next week, referral given. I estimate there is LOW risk for COMPARTMENT SYNDROME, DEEP VENOUS THROMBOSIS, SEPTIC ARTHRITIS, TENDON OR NEUROVASCULAR INJURY, thus I consider the discharge disposition reasonable. FINAL IMPRESSION      1. Closed fracture of distal end of right radius, unspecified fracture morphology, initial encounter          DISPOSITION/PLAN   DISPOSITION Decision To Discharge 09/06/2019 10:25:36 AM      PATIENT REFERREDTO:  Gagandeep Duckworth MD  [facility] [address] [place] [address]  [phone]    Schedule an appointment as soon as possible for a visit         DISCHARGE MEDICATIONS:  Discharge Medication List as of 9/6/2019 11:04 AM      START taking these medications    Details   traMADol (ULTRAM) 50 MG tablet Take 1 tablet by mouth every 6 hours as needed for Pain for up to 3 days. , Disp-20 tablet, R-0Print             DISCONTINUED MEDICATIONS:  Discharge Medication List as of 9/6/2019 11:04 AM                 (Please note that portions ofthis note were completed with a voice recognition program.  Efforts were made to edit the dictations but occasionally words are mis-transcribed.)    Leonor Saha, MOIRA - CNP (electronically signed)            Donny Bowers, MOIRA - CNP  09/06/19 5716

## 2019-09-10 ENCOUNTER — APPOINTMENT (OUTPATIENT)
Dept: GENERAL RADIOLOGY | Age: 69
End: 2019-09-10
Attending: ORTHOPAEDIC SURGERY
Payer: MEDICARE

## 2019-09-10 ENCOUNTER — ANESTHESIA EVENT (OUTPATIENT)
Dept: OPERATING ROOM | Age: 69
End: 2019-09-10
Payer: MEDICARE

## 2019-09-10 ENCOUNTER — OFFICE VISIT (OUTPATIENT)
Dept: ORTHOPEDIC SURGERY | Age: 69
End: 2019-09-10
Payer: MEDICARE

## 2019-09-10 ENCOUNTER — HOSPITAL ENCOUNTER (OUTPATIENT)
Age: 69
Setting detail: OUTPATIENT SURGERY
Discharge: HOME | End: 2019-09-10
Attending: ORTHOPAEDIC SURGERY | Admitting: ORTHOPAEDIC SURGERY
Payer: MEDICARE

## 2019-09-10 ENCOUNTER — ANESTHESIA (OUTPATIENT)
Dept: OPERATING ROOM | Age: 69
End: 2019-09-10
Payer: MEDICARE

## 2019-09-10 VITALS
SYSTOLIC BLOOD PRESSURE: 154 MMHG | TEMPERATURE: 98 F | OXYGEN SATURATION: 100 % | BODY MASS INDEX: 20.4 KG/M2 | RESPIRATION RATE: 16 BRPM | WEIGHT: 108.06 LBS | HEART RATE: 65 BPM | DIASTOLIC BLOOD PRESSURE: 94 MMHG | HEIGHT: 61 IN

## 2019-09-10 VITALS
OXYGEN SATURATION: 100 % | RESPIRATION RATE: 1 BRPM | TEMPERATURE: 95.9 F | SYSTOLIC BLOOD PRESSURE: 104 MMHG | DIASTOLIC BLOOD PRESSURE: 56 MMHG

## 2019-09-10 VITALS — BODY MASS INDEX: 20.58 KG/M2 | WEIGHT: 109 LBS | HEART RATE: 72 BPM | RESPIRATION RATE: 16 BRPM | HEIGHT: 61 IN

## 2019-09-10 DIAGNOSIS — S52.531A CLOSED COLLES' FRACTURE OF RIGHT RADIUS, INITIAL ENCOUNTER: Primary | ICD-10-CM

## 2019-09-10 PROCEDURE — 7100000011 HC PHASE II RECOVERY - ADDTL 15 MIN: Performed by: ORTHOPAEDIC SURGERY

## 2019-09-10 PROCEDURE — 2720000010 HC SURG SUPPLY STERILE: Performed by: ORTHOPAEDIC SURGERY

## 2019-09-10 PROCEDURE — G8420 CALC BMI NORM PARAMETERS: HCPCS | Performed by: ORTHOPAEDIC SURGERY

## 2019-09-10 PROCEDURE — 1090F PRES/ABSN URINE INCON ASSESS: CPT | Performed by: ORTHOPAEDIC SURGERY

## 2019-09-10 PROCEDURE — 3017F COLORECTAL CA SCREEN DOC REV: CPT | Performed by: ORTHOPAEDIC SURGERY

## 2019-09-10 PROCEDURE — 2580000003 HC RX 258: Performed by: FAMILY MEDICINE

## 2019-09-10 PROCEDURE — 3600000004 HC SURGERY LEVEL 4 BASE: Performed by: ORTHOPAEDIC SURGERY

## 2019-09-10 PROCEDURE — C1713 ANCHOR/SCREW BN/BN,TIS/BN: HCPCS | Performed by: ORTHOPAEDIC SURGERY

## 2019-09-10 PROCEDURE — 2500000003 HC RX 250 WO HCPCS: Performed by: NURSE ANESTHETIST, CERTIFIED REGISTERED

## 2019-09-10 PROCEDURE — 4004F PT TOBACCO SCREEN RCVD TLK: CPT | Performed by: ORTHOPAEDIC SURGERY

## 2019-09-10 PROCEDURE — 3600000014 HC SURGERY LEVEL 4 ADDTL 15MIN: Performed by: ORTHOPAEDIC SURGERY

## 2019-09-10 PROCEDURE — 4040F PNEUMOC VAC/ADMIN/RCVD: CPT | Performed by: ORTHOPAEDIC SURGERY

## 2019-09-10 PROCEDURE — 7100000000 HC PACU RECOVERY - FIRST 15 MIN: Performed by: ORTHOPAEDIC SURGERY

## 2019-09-10 PROCEDURE — 3209999900 FLUORO FOR SURGICAL PROCEDURES

## 2019-09-10 PROCEDURE — G8399 PT W/DXA RESULTS DOCUMENT: HCPCS | Performed by: ORTHOPAEDIC SURGERY

## 2019-09-10 PROCEDURE — 3700000000 HC ANESTHESIA ATTENDED CARE: Performed by: ORTHOPAEDIC SURGERY

## 2019-09-10 PROCEDURE — 6360000002 HC RX W HCPCS: Performed by: NURSE ANESTHETIST, CERTIFIED REGISTERED

## 2019-09-10 PROCEDURE — 1123F ACP DISCUSS/DSCN MKR DOCD: CPT | Performed by: ORTHOPAEDIC SURGERY

## 2019-09-10 PROCEDURE — 64415 NJX AA&/STRD BRCH PLXS IMG: CPT | Performed by: FAMILY MEDICINE

## 2019-09-10 PROCEDURE — 6360000002 HC RX W HCPCS: Performed by: ORTHOPAEDIC SURGERY

## 2019-09-10 PROCEDURE — 7100000001 HC PACU RECOVERY - ADDTL 15 MIN: Performed by: ORTHOPAEDIC SURGERY

## 2019-09-10 PROCEDURE — 25608 OPTX DST RD XART FX/EPI SEP2: CPT | Performed by: ORTHOPAEDIC SURGERY

## 2019-09-10 PROCEDURE — 7100000010 HC PHASE II RECOVERY - FIRST 15 MIN: Performed by: ORTHOPAEDIC SURGERY

## 2019-09-10 PROCEDURE — 2709999900 HC NON-CHARGEABLE SUPPLY: Performed by: ORTHOPAEDIC SURGERY

## 2019-09-10 PROCEDURE — 99203 OFFICE O/P NEW LOW 30 MIN: CPT | Performed by: ORTHOPAEDIC SURGERY

## 2019-09-10 PROCEDURE — 73100 X-RAY EXAM OF WRIST: CPT

## 2019-09-10 PROCEDURE — G8427 DOCREV CUR MEDS BY ELIG CLIN: HCPCS | Performed by: ORTHOPAEDIC SURGERY

## 2019-09-10 PROCEDURE — 3700000001 HC ADD 15 MINUTES (ANESTHESIA): Performed by: ORTHOPAEDIC SURGERY

## 2019-09-10 DEVICE — BONE SCREW, FULLY THREADED, T8
Type: IMPLANTABLE DEVICE | Site: WRIST | Status: FUNCTIONAL
Brand: VARIAX

## 2019-09-10 DEVICE — LOCKING SCREW, FULLY THREADED,T8
Type: IMPLANTABLE DEVICE | Site: WRIST | Status: FUNCTIONAL
Brand: VARIAX

## 2019-09-10 DEVICE — VOLAR DR PLATE NARROW RIGHT EXTRASHORT
Type: IMPLANTABLE DEVICE | Site: WRIST | Status: FUNCTIONAL
Brand: VARIAX

## 2019-09-10 RX ORDER — PROPOFOL 10 MG/ML
INJECTION, EMULSION INTRAVENOUS PRN
Status: DISCONTINUED | OUTPATIENT
Start: 2019-09-10 | End: 2019-09-10 | Stop reason: SDUPTHER

## 2019-09-10 RX ORDER — SODIUM CHLORIDE 0.9 % (FLUSH) 0.9 %
10 SYRINGE (ML) INJECTION PRN
Status: DISCONTINUED | OUTPATIENT
Start: 2019-09-10 | End: 2019-09-10 | Stop reason: HOSPADM

## 2019-09-10 RX ORDER — PROPOFOL 10 MG/ML
INJECTION, EMULSION INTRAVENOUS PRN
Status: DISCONTINUED | OUTPATIENT
Start: 2019-09-10 | End: 2019-09-10

## 2019-09-10 RX ORDER — HYDROMORPHONE HCL 110MG/55ML
0.25 PATIENT CONTROLLED ANALGESIA SYRINGE INTRAVENOUS EVERY 5 MIN PRN
Status: DISCONTINUED | OUTPATIENT
Start: 2019-09-10 | End: 2019-09-10 | Stop reason: HOSPADM

## 2019-09-10 RX ORDER — DEXAMETHASONE SODIUM PHOSPHATE 4 MG/ML
INJECTION, SOLUTION INTRA-ARTICULAR; INTRALESIONAL; INTRAMUSCULAR; INTRAVENOUS; SOFT TISSUE PRN
Status: DISCONTINUED | OUTPATIENT
Start: 2019-09-10 | End: 2019-09-10 | Stop reason: SDUPTHER

## 2019-09-10 RX ORDER — FENTANYL CITRATE 50 UG/ML
50 INJECTION, SOLUTION INTRAMUSCULAR; INTRAVENOUS EVERY 5 MIN PRN
Status: DISCONTINUED | OUTPATIENT
Start: 2019-09-10 | End: 2019-09-10 | Stop reason: HOSPADM

## 2019-09-10 RX ORDER — MEPERIDINE HYDROCHLORIDE 25 MG/ML
12.5 INJECTION INTRAMUSCULAR; INTRAVENOUS; SUBCUTANEOUS EVERY 5 MIN PRN
Status: DISCONTINUED | OUTPATIENT
Start: 2019-09-10 | End: 2019-09-10 | Stop reason: HOSPADM

## 2019-09-10 RX ORDER — TRAMADOL HYDROCHLORIDE 50 MG/1
50 TABLET ORAL EVERY 6 HOURS PRN
Qty: 20 TABLET | Refills: 0 | Status: SHIPPED | OUTPATIENT
Start: 2019-09-10 | End: 2019-09-15

## 2019-09-10 RX ORDER — PROMETHAZINE HYDROCHLORIDE 25 MG/ML
6.25 INJECTION, SOLUTION INTRAMUSCULAR; INTRAVENOUS PRN
Status: DISCONTINUED | OUTPATIENT
Start: 2019-09-10 | End: 2019-09-10 | Stop reason: HOSPADM

## 2019-09-10 RX ORDER — FENTANYL CITRATE 50 UG/ML
50 INJECTION, SOLUTION INTRAMUSCULAR; INTRAVENOUS ONCE
Status: COMPLETED | OUTPATIENT
Start: 2019-09-10 | End: 2019-09-10

## 2019-09-10 RX ORDER — CEFAZOLIN SODIUM 2 G/100ML
2 INJECTION, SOLUTION INTRAVENOUS
Status: COMPLETED | OUTPATIENT
Start: 2019-09-10 | End: 2019-09-10

## 2019-09-10 RX ORDER — LIDOCAINE HYDROCHLORIDE 20 MG/ML
INJECTION, SOLUTION INFILTRATION; PERINEURAL PRN
Status: DISCONTINUED | OUTPATIENT
Start: 2019-09-10 | End: 2019-09-10 | Stop reason: SDUPTHER

## 2019-09-10 RX ORDER — HYDROMORPHONE HCL 110MG/55ML
0.5 PATIENT CONTROLLED ANALGESIA SYRINGE INTRAVENOUS EVERY 5 MIN PRN
Status: DISCONTINUED | OUTPATIENT
Start: 2019-09-10 | End: 2019-09-10 | Stop reason: HOSPADM

## 2019-09-10 RX ORDER — CEPHALEXIN 500 MG/1
500 CAPSULE ORAL 4 TIMES DAILY
Qty: 20 CAPSULE | Refills: 0 | Status: SHIPPED | OUTPATIENT
Start: 2019-09-10 | End: 2019-12-10

## 2019-09-10 RX ORDER — SODIUM CHLORIDE 0.9 % (FLUSH) 0.9 %
10 SYRINGE (ML) INJECTION EVERY 12 HOURS SCHEDULED
Status: DISCONTINUED | OUTPATIENT
Start: 2019-09-10 | End: 2019-09-10 | Stop reason: HOSPADM

## 2019-09-10 RX ORDER — LABETALOL HYDROCHLORIDE 5 MG/ML
5 INJECTION, SOLUTION INTRAVENOUS EVERY 10 MIN PRN
Status: DISCONTINUED | OUTPATIENT
Start: 2019-09-10 | End: 2019-09-10 | Stop reason: HOSPADM

## 2019-09-10 RX ORDER — ONDANSETRON 2 MG/ML
INJECTION INTRAMUSCULAR; INTRAVENOUS PRN
Status: DISCONTINUED | OUTPATIENT
Start: 2019-09-10 | End: 2019-09-10 | Stop reason: SDUPTHER

## 2019-09-10 RX ORDER — DIPHENHYDRAMINE HYDROCHLORIDE 50 MG/ML
12.5 INJECTION INTRAMUSCULAR; INTRAVENOUS
Status: DISCONTINUED | OUTPATIENT
Start: 2019-09-10 | End: 2019-09-10 | Stop reason: HOSPADM

## 2019-09-10 RX ORDER — SODIUM CHLORIDE 9 MG/ML
INJECTION, SOLUTION INTRAVENOUS CONTINUOUS
Status: DISCONTINUED | OUTPATIENT
Start: 2019-09-10 | End: 2019-09-10 | Stop reason: HOSPADM

## 2019-09-10 RX ADMIN — LIDOCAINE HYDROCHLORIDE 100 MG: 20 INJECTION, SOLUTION INFILTRATION; PERINEURAL at 11:40

## 2019-09-10 RX ADMIN — SODIUM CHLORIDE: 9 INJECTION, SOLUTION INTRAVENOUS at 11:37

## 2019-09-10 RX ADMIN — FENTANYL CITRATE 50 MCG: 50 INJECTION INTRAMUSCULAR; INTRAVENOUS at 10:57

## 2019-09-10 RX ADMIN — CEFAZOLIN SODIUM 2 G: 2 INJECTION, SOLUTION INTRAVENOUS at 11:35

## 2019-09-10 RX ADMIN — DEXAMETHASONE SODIUM PHOSPHATE 8 MG: 4 INJECTION, SOLUTION INTRAMUSCULAR; INTRAVENOUS at 11:55

## 2019-09-10 RX ADMIN — SODIUM CHLORIDE: 9 INJECTION, SOLUTION INTRAVENOUS at 10:49

## 2019-09-10 RX ADMIN — PROPOFOL 150 MG: 10 INJECTION, EMULSION INTRAVENOUS at 11:40

## 2019-09-10 RX ADMIN — ONDANSETRON 4 MG: 2 INJECTION INTRAMUSCULAR; INTRAVENOUS at 11:55

## 2019-09-10 ASSESSMENT — PULMONARY FUNCTION TESTS
PIF_VALUE: 0
PIF_VALUE: 10
PIF_VALUE: 18
PIF_VALUE: 19
PIF_VALUE: 10
PIF_VALUE: 2
PIF_VALUE: 1
PIF_VALUE: 2
PIF_VALUE: 1
PIF_VALUE: 2
PIF_VALUE: 1
PIF_VALUE: 2
PIF_VALUE: 15
PIF_VALUE: 2
PIF_VALUE: 10
PIF_VALUE: 2
PIF_VALUE: 10
PIF_VALUE: 2
PIF_VALUE: 14
PIF_VALUE: 2
PIF_VALUE: 0
PIF_VALUE: 17
PIF_VALUE: 4
PIF_VALUE: 0
PIF_VALUE: 2
PIF_VALUE: 18
PIF_VALUE: 3
PIF_VALUE: 18
PIF_VALUE: 2
PIF_VALUE: 18
PIF_VALUE: 10
PIF_VALUE: 2
PIF_VALUE: 0
PIF_VALUE: 2
PIF_VALUE: 4
PIF_VALUE: 9
PIF_VALUE: 2
PIF_VALUE: 0
PIF_VALUE: 2
PIF_VALUE: 10
PIF_VALUE: 0
PIF_VALUE: 2
PIF_VALUE: 20
PIF_VALUE: 17
PIF_VALUE: 2

## 2019-09-10 ASSESSMENT — PAIN - FUNCTIONAL ASSESSMENT
PAIN_FUNCTIONAL_ASSESSMENT: PREVENTS OR INTERFERES SOME ACTIVE ACTIVITIES AND ADLS
PAIN_FUNCTIONAL_ASSESSMENT: 0-10

## 2019-09-10 ASSESSMENT — PAIN DESCRIPTION - DESCRIPTORS: DESCRIPTORS: ACHING;SHOOTING;THROBBING

## 2019-09-10 NOTE — PROGRESS NOTES
Received from or - drowsy,nasal cannula,right wrist dressing/splint/ace dry and intact,circulation good,ice pack placed,elevated,best paws,vss.

## 2019-09-10 NOTE — PROGRESS NOTES
Teaching/ education completed for home care including pain management, wound care,activity,safety precautions and infection control. Patient and daughter verbalized understanding.

## 2019-09-10 NOTE — H&P
reason for surgery, diagnosis and treatment plan and there has been no change. The patient was evaluated by me today. Physical exam findings for this update include:    Vitals:    09/10/19 1028   BP: 114/71   Pulse: 62   Resp: 16   Temp: 97 °F (36.1 °C)   SpO2: 100%     Airway is intact  Chest: chest clear, no wheezing, rales, normal symmetric air entry, no tachypnea, retractions or cyanosis  Heart: regular rate and rhythm ; heart sounds normal  Findings on exam of the body region where surgery is to be performed include:  Right distal radius fracture.     Electronically signed by Kenney Claude on 9/10/2019 at 10:36 AM

## 2019-09-11 NOTE — OP NOTE
inflated to 250  mmHg. A volar approach was performed. Incision was made over the FCR  tendon. The tendon sheath was opened. At this point, we incised the  pronator quadratus muscle with a cautery. We exposed the fracture and  found to be a moderately displaced two-part intraarticular fracture. We  were carefully able to reduce the fracture anatomically. While  maintaining the reduction, we put the plate in appropriate position. We  put one screw in the oblong hole and we reduced the fracture to the  plate and locked it with a total of five distal 2.7 locking screws. We  added one more locking screw in the shaft. Overall, we were very  satisfied with the anatomic reduction and position of all the screws. At this point, we let the tourniquet down and hemostasis was secured. We irrigated the incision copiously with normal saline. We closed the  subcu with a 3-0 Vicryl and the skin with a 4-0 Monocryl. Steri-Strips  was then applied. Dressing was then applied in the form of Xeroform, 4  x 4, sterile Webril, and a volar splint was applied. The patient tolerated the procedure well and was taken to the recovery  in stable condition. POSTOPERATIVE PLAN:  The patient will be discharged home. She will be  nonweightbearing, but she can start range of motion in two weeks of her  wrist and she can start range of motion of the fingers immediately.         Ludmila Rojas MD    D: 09/10/2019 12:34:55       T: 09/10/2019 14:59:29     [email or web address]  Job#: [id number]     Doc#: [id number]    CC:  Brianna Murillo MD

## 2019-09-24 ENCOUNTER — OFFICE VISIT (OUTPATIENT)
Dept: ORTHOPEDIC SURGERY | Age: 69
End: 2019-09-24
Payer: MEDICARE

## 2019-09-24 VITALS — WEIGHT: 107 LBS | RESPIRATION RATE: 14 BRPM | HEIGHT: 61 IN | BODY MASS INDEX: 20.2 KG/M2

## 2019-09-24 DIAGNOSIS — S52.531A CLOSED COLLES' FRACTURE OF RIGHT RADIUS, INITIAL ENCOUNTER: Primary | ICD-10-CM

## 2019-09-24 PROCEDURE — 99024 POSTOP FOLLOW-UP VISIT: CPT | Performed by: NURSE PRACTITIONER

## 2019-09-24 PROCEDURE — APPNB15 APP NON BILLABLE TIME 0-15 MINS: Performed by: NURSE PRACTITIONER

## 2019-09-24 PROCEDURE — L3908 WHO COCK-UP NONMOLDE PRE OTS: HCPCS | Performed by: ORTHOPAEDIC SURGERY

## 2019-09-27 NOTE — PROGRESS NOTES
affected area, provide functional support and facilitate healing. The patient was educated and fit by a healthcare professional with expert knowledge and specialization in brace application while under the direct supervision of the treating physician. Verbal and written instructions for the use of and application of this item were provided. They were instructed to contact the office immediately should the brace result in increased pain, decreased sensation, increased swelling or worsening of the condition.        Gina Sherman, APRN - CNP

## 2019-11-05 ENCOUNTER — OFFICE VISIT (OUTPATIENT)
Dept: ORTHOPEDIC SURGERY | Age: 69
End: 2019-11-05

## 2019-11-05 VITALS — BODY MASS INDEX: 21.03 KG/M2 | HEIGHT: 61 IN | RESPIRATION RATE: 16 BRPM | WEIGHT: 111.4 LBS

## 2019-11-05 DIAGNOSIS — S52.531A CLOSED COLLES' FRACTURE OF RIGHT RADIUS, INITIAL ENCOUNTER: Primary | ICD-10-CM

## 2019-11-05 PROCEDURE — APPNB15 APP NON BILLABLE TIME 0-15 MINS: Performed by: NURSE PRACTITIONER

## 2019-11-05 PROCEDURE — 99024 POSTOP FOLLOW-UP VISIT: CPT | Performed by: NURSE PRACTITIONER

## 2019-12-03 ENCOUNTER — OFFICE VISIT (OUTPATIENT)
Dept: NEUROLOGY | Age: 69
End: 2019-12-03
Payer: MEDICARE

## 2019-12-03 VITALS
SYSTOLIC BLOOD PRESSURE: 94 MMHG | BODY MASS INDEX: 20.78 KG/M2 | WEIGHT: 110 LBS | HEART RATE: 78 BPM | DIASTOLIC BLOOD PRESSURE: 65 MMHG

## 2019-12-03 DIAGNOSIS — G25.0 BENIGN ESSENTIAL TREMOR: ICD-10-CM

## 2019-12-03 PROCEDURE — 4040F PNEUMOC VAC/ADMIN/RCVD: CPT | Performed by: PSYCHIATRY & NEUROLOGY

## 2019-12-03 PROCEDURE — G8484 FLU IMMUNIZE NO ADMIN: HCPCS | Performed by: PSYCHIATRY & NEUROLOGY

## 2019-12-03 PROCEDURE — 99213 OFFICE O/P EST LOW 20 MIN: CPT | Performed by: PSYCHIATRY & NEUROLOGY

## 2019-12-03 PROCEDURE — G8420 CALC BMI NORM PARAMETERS: HCPCS | Performed by: PSYCHIATRY & NEUROLOGY

## 2019-12-03 PROCEDURE — 1090F PRES/ABSN URINE INCON ASSESS: CPT | Performed by: PSYCHIATRY & NEUROLOGY

## 2019-12-03 PROCEDURE — G8427 DOCREV CUR MEDS BY ELIG CLIN: HCPCS | Performed by: PSYCHIATRY & NEUROLOGY

## 2019-12-03 PROCEDURE — G8399 PT W/DXA RESULTS DOCUMENT: HCPCS | Performed by: PSYCHIATRY & NEUROLOGY

## 2019-12-03 PROCEDURE — 3017F COLORECTAL CA SCREEN DOC REV: CPT | Performed by: PSYCHIATRY & NEUROLOGY

## 2019-12-03 PROCEDURE — 4004F PT TOBACCO SCREEN RCVD TLK: CPT | Performed by: PSYCHIATRY & NEUROLOGY

## 2019-12-03 PROCEDURE — 1123F ACP DISCUSS/DSCN MKR DOCD: CPT | Performed by: PSYCHIATRY & NEUROLOGY

## 2019-12-03 RX ORDER — PRIMIDONE 50 MG/1
TABLET ORAL
Qty: 270 TABLET | Refills: 1 | Status: SHIPPED | OUTPATIENT
Start: 2019-12-03 | End: 2020-05-26 | Stop reason: SDUPTHER

## 2019-12-10 ENCOUNTER — OFFICE VISIT (OUTPATIENT)
Dept: FAMILY MEDICINE CLINIC | Age: 69
End: 2019-12-10
Payer: MEDICARE

## 2019-12-10 VITALS
WEIGHT: 114 LBS | HEART RATE: 73 BPM | DIASTOLIC BLOOD PRESSURE: 74 MMHG | OXYGEN SATURATION: 99 % | TEMPERATURE: 98.1 F | BODY MASS INDEX: 21.54 KG/M2 | SYSTOLIC BLOOD PRESSURE: 116 MMHG

## 2019-12-10 DIAGNOSIS — J01.90 ACUTE BACTERIAL SINUSITIS: Primary | ICD-10-CM

## 2019-12-10 DIAGNOSIS — B96.89 ACUTE BACTERIAL SINUSITIS: Primary | ICD-10-CM

## 2019-12-10 PROBLEM — Z09 SURGERY FOLLOW-UP EXAMINATION: Status: ACTIVE | Noted: 2019-12-10

## 2019-12-10 PROBLEM — H25.12 CATARACT, NUCLEAR SCLEROTIC SENILE, LEFT: Status: ACTIVE | Noted: 2018-10-17

## 2019-12-10 PROBLEM — H35.359 CYSTOID MACULAR DEGENERATION OF RETINA: Status: ACTIVE | Noted: 2019-12-10

## 2019-12-10 PROBLEM — Z96.1 LENS REPLACED BY OTHER MEANS: Status: ACTIVE | Noted: 2019-12-10

## 2019-12-10 PROBLEM — H43.812 VITREOUS DEGENERATION, LEFT EYE: Status: ACTIVE | Noted: 2019-12-10

## 2019-12-10 PROBLEM — H43.822 VITREOMACULAR ADHESION, LEFT EYE: Status: ACTIVE | Noted: 2019-12-10

## 2019-12-10 PROCEDURE — 4004F PT TOBACCO SCREEN RCVD TLK: CPT | Performed by: FAMILY MEDICINE

## 2019-12-10 PROCEDURE — 4040F PNEUMOC VAC/ADMIN/RCVD: CPT | Performed by: FAMILY MEDICINE

## 2019-12-10 PROCEDURE — 3017F COLORECTAL CA SCREEN DOC REV: CPT | Performed by: FAMILY MEDICINE

## 2019-12-10 PROCEDURE — 1090F PRES/ABSN URINE INCON ASSESS: CPT | Performed by: FAMILY MEDICINE

## 2019-12-10 PROCEDURE — 1123F ACP DISCUSS/DSCN MKR DOCD: CPT | Performed by: FAMILY MEDICINE

## 2019-12-10 PROCEDURE — 99213 OFFICE O/P EST LOW 20 MIN: CPT | Performed by: FAMILY MEDICINE

## 2019-12-10 PROCEDURE — G8484 FLU IMMUNIZE NO ADMIN: HCPCS | Performed by: FAMILY MEDICINE

## 2019-12-10 PROCEDURE — G8399 PT W/DXA RESULTS DOCUMENT: HCPCS | Performed by: FAMILY MEDICINE

## 2019-12-10 PROCEDURE — G8427 DOCREV CUR MEDS BY ELIG CLIN: HCPCS | Performed by: FAMILY MEDICINE

## 2019-12-10 PROCEDURE — G8420 CALC BMI NORM PARAMETERS: HCPCS | Performed by: FAMILY MEDICINE

## 2019-12-10 RX ORDER — POLYMYXIN B SULFATE AND TRIMETHOPRIM 1; 10000 MG/ML; [USP'U]/ML
1 SOLUTION OPHTHALMIC EVERY 4 HOURS
Qty: 1 BOTTLE | Refills: 1 | Status: SHIPPED | OUTPATIENT
Start: 2019-12-10 | End: 2019-12-20

## 2019-12-10 RX ORDER — SUMATRIPTAN 100 MG/1
TABLET, FILM COATED ORAL
Qty: 9 TABLET | Refills: 2 | Status: SHIPPED | OUTPATIENT
Start: 2019-12-10 | End: 2020-05-06 | Stop reason: SDUPTHER

## 2019-12-10 RX ORDER — CEFUROXIME AXETIL 250 MG/1
250 TABLET ORAL 2 TIMES DAILY
Qty: 20 TABLET | Refills: 0 | Status: SHIPPED | OUTPATIENT
Start: 2019-12-10 | End: 2019-12-20

## 2019-12-16 ENCOUNTER — CARE COORDINATION (OUTPATIENT)
Dept: CARE COORDINATION | Age: 69
End: 2019-12-16

## 2019-12-26 ENCOUNTER — CARE COORDINATION (OUTPATIENT)
Dept: CARE COORDINATION | Age: 69
End: 2019-12-26

## 2020-01-06 ENCOUNTER — CARE COORDINATION (OUTPATIENT)
Dept: CARE COORDINATION | Age: 70
End: 2020-01-06

## 2020-01-09 PROBLEM — Z09 SURGERY FOLLOW-UP EXAMINATION: Status: RESOLVED | Noted: 2019-12-10 | Resolved: 2020-01-09

## 2020-05-06 RX ORDER — SUMATRIPTAN 100 MG/1
TABLET, FILM COATED ORAL
Qty: 9 TABLET | Refills: 2 | Status: SHIPPED | OUTPATIENT
Start: 2020-05-06 | End: 2020-10-05 | Stop reason: SDUPTHER

## 2020-05-06 NOTE — TELEPHONE ENCOUNTER
Medication:   Requested Prescriptions     Pending Prescriptions Disp Refills    SUMAtriptan (IMITREX) 100 MG tablet 9 tablet 2     Sig: TAKE 1 TABLET BY MOUTH FOR 1 DOSE AS NEEDED FOR MIGRAINE        Last Filled:  12/10/19 #9, 2 RF     Patient Phone Number: [phone] (home)     Last appt: 12/10/2019 sinus problem   Next appt: Visit date not found    Last OARRS: No flowsheet data found.

## 2020-05-26 ENCOUNTER — VIRTUAL VISIT (OUTPATIENT)
Dept: NEUROLOGY | Age: 70
End: 2020-05-26
Payer: MEDICARE

## 2020-05-26 VITALS — WEIGHT: 110 LBS | BODY MASS INDEX: 20.77 KG/M2 | HEIGHT: 61 IN

## 2020-05-26 PROCEDURE — 3017F COLORECTAL CA SCREEN DOC REV: CPT | Performed by: PSYCHIATRY & NEUROLOGY

## 2020-05-26 PROCEDURE — 4040F PNEUMOC VAC/ADMIN/RCVD: CPT | Performed by: PSYCHIATRY & NEUROLOGY

## 2020-05-26 PROCEDURE — 1123F ACP DISCUSS/DSCN MKR DOCD: CPT | Performed by: PSYCHIATRY & NEUROLOGY

## 2020-05-26 PROCEDURE — 1090F PRES/ABSN URINE INCON ASSESS: CPT | Performed by: PSYCHIATRY & NEUROLOGY

## 2020-05-26 PROCEDURE — G8427 DOCREV CUR MEDS BY ELIG CLIN: HCPCS | Performed by: PSYCHIATRY & NEUROLOGY

## 2020-05-26 PROCEDURE — G8399 PT W/DXA RESULTS DOCUMENT: HCPCS | Performed by: PSYCHIATRY & NEUROLOGY

## 2020-05-26 PROCEDURE — 99213 OFFICE O/P EST LOW 20 MIN: CPT | Performed by: PSYCHIATRY & NEUROLOGY

## 2020-05-26 RX ORDER — PRIMIDONE 50 MG/1
TABLET ORAL
Qty: 360 TABLET | Refills: 1 | Status: SHIPPED | OUTPATIENT
Start: 2020-05-26 | End: 2022-03-18 | Stop reason: SDUPTHER

## 2020-05-26 NOTE — PATIENT INSTRUCTIONS
Please call with any questions or concerns:   [facility]  @ [phone]. LAB RESULTS:  Please obtain any labs or diagnostic tests as discussed today. You should call the office to check the results. Please allow  3 to 7 days for us to get these results. MEDICATION LIST:  Please bring an accurate list of your medications to every visit. APPOINTMENT CONFIRMATION:  We will call you the day before your scheduled appointment to confirm. If we are unable to reach you, you MUST call back by the end of the day to confirm the appointment or we may be forced to cancel.

## 2020-05-26 NOTE — PROGRESS NOTES
Non-medical: Not on file   Tobacco Use    Smoking status: Current Some Day Smoker     Packs/day: 1.00     Years: 30.00     Pack years: 30.00     Types: Cigarettes     Last attempt to quit: 3/1/2010     Years since quitting: 10.2    Smokeless tobacco: Never Used    Tobacco comment: H.O.smoking 1 p.p.d x 30+ yrs / Quit 3/1/2010   Substance and Sexual Activity    Alcohol use: Yes     Comment: 1 beer/mo.  Drug use: Never    Sexual activity: Not on file   Lifestyle    Physical activity     Days per week: Not on file     Minutes per session: Not on file    Stress: Not on file   Relationships    Social connections     Talks on phone: Not on file     Gets together: Not on file     Attends Restoration service: Not on file     Active member of club or organization: Not on file     Attends meetings of clubs or organizations: Not on file     Relationship status: Not on file    Intimate partner violence     Fear of current or ex partner: Not on file     Emotionally abused: Not on file     Physically abused: Not on file     Forced sexual activity: Not on file   Other Topics Concern    Not on file   Social History Narrative    Not on file        Objective:  Exam:  There were no vitals taken for this visit. This is a well-nourished patient in no acute distress  Awake alert and oriented x3. Speech normal.  Face symmetrical.  Tongue midline. Moves all 4 extremities well. Has tremors of the outstretched hands.     Data :  LABS:  General Labs:    CBC:   Lab Results   Component Value Date    WBC 5.7 04/26/2019    RBC 4.58 04/26/2019    HGB 15.1 04/26/2019    HCT 45.2 04/26/2019    MCV 98.7 04/26/2019    MCH 32.9 04/26/2019    MCHC 33.4 04/26/2019    RDW 13.2 04/26/2019     04/26/2019    MPV 8.3 04/26/2019     BMP:    Lab Results   Component Value Date     04/26/2019    K 4.5 04/26/2019     04/26/2019    CO2 27 04/26/2019    BUN 14 04/26/2019    LABALBU 4.0 04/26/2019    CREATININE 0.6 04/26/2019

## 2020-10-01 ENCOUNTER — APPOINTMENT (OUTPATIENT)
Dept: CT IMAGING | Age: 70
End: 2020-10-01
Payer: MEDICARE

## 2020-10-01 ENCOUNTER — APPOINTMENT (OUTPATIENT)
Dept: GENERAL RADIOLOGY | Age: 70
End: 2020-10-01
Payer: MEDICARE

## 2020-10-01 ENCOUNTER — HOSPITAL ENCOUNTER (EMERGENCY)
Age: 70
Discharge: HOME | End: 2020-10-01
Payer: MEDICARE

## 2020-10-01 VITALS
HEIGHT: 62 IN | WEIGHT: 122 LBS | DIASTOLIC BLOOD PRESSURE: 96 MMHG | SYSTOLIC BLOOD PRESSURE: 153 MMHG | HEART RATE: 87 BPM | OXYGEN SATURATION: 97 % | BODY MASS INDEX: 22.45 KG/M2 | TEMPERATURE: 98.5 F | RESPIRATION RATE: 20 BRPM

## 2020-10-01 PROCEDURE — 90715 TDAP VACCINE 7 YRS/> IM: CPT | Performed by: PHYSICIAN ASSISTANT

## 2020-10-01 PROCEDURE — 6370000000 HC RX 637 (ALT 250 FOR IP): Performed by: PHYSICIAN ASSISTANT

## 2020-10-01 PROCEDURE — 12013 RPR F/E/E/N/L/M 2.6-5.0 CM: CPT

## 2020-10-01 PROCEDURE — 90471 IMMUNIZATION ADMIN: CPT | Performed by: PHYSICIAN ASSISTANT

## 2020-10-01 PROCEDURE — 73560 X-RAY EXAM OF KNEE 1 OR 2: CPT

## 2020-10-01 PROCEDURE — 72125 CT NECK SPINE W/O DYE: CPT

## 2020-10-01 PROCEDURE — 70486 CT MAXILLOFACIAL W/O DYE: CPT

## 2020-10-01 PROCEDURE — 73070 X-RAY EXAM OF ELBOW: CPT

## 2020-10-01 PROCEDURE — 70450 CT HEAD/BRAIN W/O DYE: CPT

## 2020-10-01 PROCEDURE — 99284 EMERGENCY DEPT VISIT MOD MDM: CPT

## 2020-10-01 PROCEDURE — 6360000002 HC RX W HCPCS: Performed by: PHYSICIAN ASSISTANT

## 2020-10-01 RX ORDER — ACETAMINOPHEN 325 MG/1
650 TABLET ORAL ONCE
Status: COMPLETED | OUTPATIENT
Start: 2020-10-01 | End: 2020-10-01

## 2020-10-01 RX ADMIN — TETANUS TOXOID, REDUCED DIPHTHERIA TOXOID AND ACELLULAR PERTUSSIS VACCINE, ADSORBED 0.5 ML: 5; 2.5; 8; 8; 2.5 SUSPENSION INTRAMUSCULAR at 17:37

## 2020-10-01 RX ADMIN — ACETAMINOPHEN 650 MG: 325 TABLET ORAL at 17:37

## 2020-10-01 ASSESSMENT — ENCOUNTER SYMPTOMS
ABDOMINAL PAIN: 0
CHEST TIGHTNESS: 0
SHORTNESS OF BREATH: 0
DIARRHEA: 0
SORE THROAT: 0
COLOR CHANGE: 1
BACK PAIN: 0
FACIAL SWELLING: 1
NAUSEA: 0
VOMITING: 0

## 2020-10-01 ASSESSMENT — PAIN SCALES - GENERAL
PAINLEVEL_OUTOF10: 8
PAINLEVEL_OUTOF10: 7

## 2020-10-01 ASSESSMENT — PAIN DESCRIPTION - LOCATION: LOCATION: HEAD

## 2020-10-01 ASSESSMENT — PAIN DESCRIPTION - PAIN TYPE: TYPE: ACUTE PAIN

## 2020-10-01 NOTE — ED NOTES
Pt Discharged in stable condition, VSS, no signs of distress, discharge instructions and meds reviewed. Pt verbalizes understanding and states no further questions or concerns unaddressed.        Ileana Amaro RN  10/01/20 1257

## 2020-10-01 NOTE — ED PROVIDER NOTES
[facility]        Pt Name: Brandi Valdez  MRN: [id number]  [place] 1950  Date of evaluation: 10/1/2020  Provider: Char Krishna PA-C  PCP: Lito Sheridan MD    JAY. I have evaluated this patient. My supervising physician was available for consultation. CHIEF COMPLAINT       Chief Complaint   Patient presents with    Laceration     Pt to Er with c/o lacertaion to forehead, swelling to right side of mouth, left arm pain and right knee pain, after tripping over vaccum. unsure of LOC. denies blood thinners       HISTORY OF PRESENT ILLNESS   (Location, Timing/Onset, Context/Setting, Quality, Duration, Modifying Factors, Severity, Associated Signs and Symptoms)  Note limiting factors. Brandi Valdez is a 68 y.o. female presents the emergency department with reports of an injury accident that occurred prior to arrival.  Patient states that she has difficulties with an intention tremor. She reports that she accidentally tripped over her vacuum. Patient states that she fell against something and struck her head. She states she had a forced hyperextension injury through her neck and sustained facial trauma on the right-hand side of her mouth as well. She states she is also having pain and discomfort over her left medial elbow as well as right knee. Patient states she does not believe that she lost consciousness. She states that she was dazed by the above-mentioned. She reports that she is not currently anticoagulated. She states she is done nothing yet for the wounds and has not taken anything for related pain which she currently rates to be 8 out of 10. Patient states that she had a difficult time getting the bleeding controlled of her forehead laceration. She has since been able to do so. She does have pain and discomfort which appears to be worse here than it is anyplace else.   Patient states she is not currently up-to-date with tetanus vaccinations. She was able to independently ambulate to the emergency department under her own power. Patient states she is not having any numbness and or tingling. She denies any additional musculoskeletal complaints other than the above-mentioned. Patient has not up-to-date with her tetanus vaccination. It is with the above-mentioned she presents for evaluation and treatment. She specifically is denying that she is experiencing chest pain, palpitations, lightheadedness or shortness of breath. She has no GI or  complaints at present. Nursing Notes were all reviewed and agreed with or any disagreements were addressed in the HPI. REVIEW OF SYSTEMS    (2-9 systems for level 4, 10 or more for level 5)     Review of Systems   Constitutional: Negative for activity change, chills and fever. HENT: Positive for facial swelling. Negative for dental problem and sore throat. Respiratory: Negative for chest tightness and shortness of breath. Cardiovascular: Negative for chest pain. Gastrointestinal: Negative for abdominal pain, diarrhea, nausea and vomiting. Genitourinary: Negative for dysuria and flank pain. Musculoskeletal: Positive for arthralgias, gait problem and neck stiffness. Negative for back pain, joint swelling and myalgias. Skin: Positive for color change and wound. Neurological: Positive for headaches. Negative for seizures, syncope, weakness and numbness. Positives and Pertinent negatives as per HPI. Except as noted above in the ROS, all other systems were reviewed and negative.        PAST MEDICAL HISTORY     Past Medical History:   Diagnosis Date    Acne     Breast [facility] [address]     Cellulitis     COPD (chronic obstructive pulmonary disease) ([facility] [address]     Essential tremor     GERD (gastroesophageal reflux disease)     Hyperlipidemia     Osteoporosis     Polyp of colon          SURGICAL HISTORY     Past Surgical History:   Procedure Laterality Date    APPENDECTOMY      BREAST SURGERY      COLONOSCOPY N/A 5/2/2019    COLONOSCOPY POLYPECTOMY SNARE/COLD BIOPSY performed by Curtis Baldwin MD at [facility]      EYE SURGERY Right 3/21/15    macular \"hole\"    HAND SURGERY      MASTECTOMY, BILATERAL Bilateral 1985    ORIF DISTAL RADIUS FRACTURE Right 9/10/2019    RIGHT DISTAL RADIUS OPEN REDUCTION INTERNAL FIXATION -STYKER performed by Arminda Oleary MD at [address] [place] [address]       Previous Medications    ASCORBIC ACID (VITAMIN C PO)    Take by mouth    CHOLECALCIFEROL (VITAMIN D PO)    Take by mouth    LIDOCAINE (LIDODERM) 5 %    Place 1 patch onto the skin daily 12 hours on, 12 hours off. PRIMIDONE (MYSOLINE) 50 MG TABLET    Take 1 tablet in the morning, 1 tablet at noon and 2 tablets at night    SUMATRIPTAN (IMITREX) 100 MG TABLET    TAKE 1 TABLET BY MOUTH FOR 1 DOSE AS NEEDED FOR MIGRAINE         ALLERGIES     Vicodin [hydrocodone-acetaminophen]; Molds & smuts; Montelukast sodium; and Pollen extract    FAMILYHISTORY       Family History   Problem Relation Age of Onset    Heart Disease Mother           SOCIAL HISTORY       Social History     Tobacco Use    Smoking status: Current Some Day Smoker     Packs/day: 1.00     Years: 30.00     Pack years: 30.00     Types: Cigarettes     Last attempt to quit: 3/1/2010     Years since quitting: 10.5    Smokeless tobacco: Never Used    Tobacco comment: H.O.smoking 1 p.p.d x 30+ yrs / Quit 3/1/2010   Substance Use Topics    Alcohol use: Yes     Comment: 1 beer/mo.  Drug use: Never       SCREENINGS             PHYSICAL EXAM    (up to 7 for level 4, 8 or more for level 5)     ED Triage Vitals [10/01/20 1344]   BP Temp Temp Source Pulse Resp SpO2 Height Weight   (!) 153/96 98.5 °F (36.9 °C) Oral 87 20 97 % 5' 2\" (1.575 m) 122 lb (55.3 kg)       Physical Exam  Vitals signs and nursing note reviewed.    Constitutional:       General: Final Result   1. CT HEAD: No definite acute intracranial abnormality. 2. Left basal ganglia lacunar stroke is likely remote, but uncommonly is seen   as an acute finding. 3. Small right forehead scalp contusion evident. 4. CT FACE: No acute traumatic injury of the facial bones. 5. Right supraorbital/forehead soft tissue contusion. 6. CT CERVICAL SPINE: No acute abnormality of the cervical spine. 7. Mild degenerative changes cervical spine. * Note that if pain persists or worsens, or if clinically there is concern   for CT occult acute cervical abnormality, flexion/extension C-spine series or   MRI cervical spine may be considered for additional evaluation. CT Head WO Contrast   Final Result   1. CT HEAD: No definite acute intracranial abnormality. 2. Left basal ganglia lacunar stroke is likely remote, but uncommonly is seen   as an acute finding. 3. Small right forehead scalp contusion evident. 4. CT FACE: No acute traumatic injury of the facial bones. 5. Right supraorbital/forehead soft tissue contusion. 6. CT CERVICAL SPINE: No acute abnormality of the cervical spine. 7. Mild degenerative changes cervical spine. * Note that if pain persists or worsens, or if clinically there is concern   for CT occult acute cervical abnormality, flexion/extension C-spine series or   MRI cervical spine may be considered for additional evaluation. XR ELBOW LEFT (2 VIEWS)   Final Result   No acute osseous abnormality. Possible contusion or cellulitis of other etiology dorsal ulnar aspect of the   proximal left forearm. Correlate with physical findings. Follow-up imaging recommended if pain persists or worsens following   conservative management. XR KNEE RIGHT (1-2 VIEWS)   Final Result   Healing fracture of the proximal fibular neck. No other acute osseous   abnormality of the right knee. Mild degenerative changes.                PROCEDURES   Unless otherwise noted below, none     Lac Repair    Date/Time: 10/1/2020 6:10 PM  Performed by: Ofelia Thomas PA-C  Authorized by: Ofelia Thomas PA-C     Consent:     Consent obtained:  Verbal    Consent given by:  Patient    Risks discussed:  Infection, pain, poor cosmetic result and poor wound healing    Alternatives discussed:  No treatment  Anesthesia (see MAR for exact dosages): Anesthesia method:  Local infiltration    Local anesthetic:  Lidocaine 1% WITH epi  Laceration details:     Location:  Face    Face location:  Forehead    Length (cm):  3  Repair type:     Repair type:  Simple  Exploration:     Hemostasis achieved with:  Epinephrine  Treatment:     Area cleansed with:  Hibiclens    Amount of cleaning:  Standard  Skin repair:     Repair method:  Sutures    Suture size:  6-0    Suture material:  Nylon    Suture technique:  Simple interrupted    Number of sutures:  4  Approximation:     Approximation:  Close  Post-procedure details:     Dressing:  Open (no dressing)    Patient tolerance of procedure: Tolerated well, no immediate complications        CRITICAL CARE TIME   N/A    CONSULTS:  None      EMERGENCY DEPARTMENT COURSE and DIFFERENTIAL DIAGNOSIS/MDM:   Vitals:    Vitals:    10/01/20 1344   BP: (!) 153/96   Pulse: 87   Resp: 20   Temp: 98.5 °F (36.9 °C)   TempSrc: Oral   SpO2: 97%   Weight: 122 lb (55.3 kg)   Height: 5' 2\" (1.575 m)       Patient was given the following medications:  Medications   acetaminophen (TYLENOL) tablet 650 mg (650 mg Oral Given 10/1/20 1737)   Tetanus-Diphth-Acell Pertussis (BOOSTRIX) injection 0.5 mL (0.5 mLs Intramuscular Given 10/1/20 1737)           The patient's detailed history of present illness is documented as above. Upon arrival to the emergency department the patient's vital signs are as documented. The patient is noted to be hemodynamically stable and afebrile. Physical examination findings are as above. Patient's tetanus vaccination was updated as above.   She was given Tylenol left forearm, initial encounter    8. Abrasion of right knee, initial encounter    9. Osteoarthritis of right knee, unspecified osteoarthritis type          DISPOSITION/PLAN   DISPOSITION: Discharged to home      PATIENT REFERREDTO:  Idell Nyhan, MD  [address] [place] [address] RD.   [address] [place] [address]  [phone]      For suture removal 7-10 days    [facility] Emergency Department  [facility]  [phone]    If symptoms worsen      DISCHARGE MEDICATIONS:  New Prescriptions    No medications on file       DISCONTINUED MEDICATIONS:  Discontinued Medications    No medications on file            (Please note that portions of this note were completed with a voice recognition program.  Efforts were made to edit the dictations but occasionally words are mis-transcribed.)    Kevyn Claire PA-C (electronically signed)           Kalani Engel PA-C  10/01/20 [id number]

## 2020-10-01 NOTE — ED NOTES
Bed: 29  Expected date:   Expected time:   Means of arrival: Walk In  Comments:     Jermain Webster RN  10/01/20 [id number]

## 2020-10-02 ENCOUNTER — CARE COORDINATION (OUTPATIENT)
Dept: CARE COORDINATION | Age: 70
End: 2020-10-02

## 2020-10-02 NOTE — TELEPHONE ENCOUNTER
I spoke with patient. She was offered appointment today with another provider and declined. She scheduled appointment for Monday. She was advised per Dr. Pritesh Cary to take advil for pain. She will call office with further concerns.

## 2020-10-02 NOTE — CARE COORDINATION
Chart reviewed. Pt seen and evaluated in ED post fall. Pt given the following referrals/recommendations (see below):    - Follow up with Isidro Dobson MD (Family Medicine); For suture removal 7-10 days     Initial Outreach call to pt. Pt stated she reached out to PCP's office for an appt, Pt declined wanting to be seen via VV or by another provider. Pt asking for ACM to reach out to PCP office to request a PCP OV (in person). Will send PCP a message. Pt stated she is hurting from her fall and \"just wants him to look at me\". Pt agreeable to have additional outreach. Patient contacted regarding COVID-19 risk and screening. Discussed COVID-19 related testing which was not done at this time. Test results were not done. Patient informed of results, if available? No.    Care Transition Nurse/ Ambulatory Care Manager contacted the patient by telephone to perform follow-up assessment. Verified name and  with patient as identifiers. Patient has following risk factors of: COPD and recent ED visit. Symptoms reviewed with patient who verbalized the following symptoms: pain or aching joints. Due to not worse- \"just hurts a lot\" pt is relating pain to her fall encounter was routed to provider for escalation. Education provided regarding infection prevention, and signs and symptoms of COVID-19 and when to seek medical attention with patient who verbalized understanding. Discussed exposure protocols and quarantine from [address] you at higher risk for severe illness  and given an opportunity for questions and concerns. The patient agrees to contact the COVID-19 hotline [phone] or PCP office for questions related to their healthcare. CTN/ACM provided contact information for future reference. From CDC: Are you at higher risk for severe illness?  Wash your hands often.  Avoid close contact (6 feet, which is about two arm lengths) with people who are sick.    Put distance between

## 2020-10-02 NOTE — TELEPHONE ENCOUNTER
Correction Dr. Emily Antoine was the [facility] neurologist I referred her to

## 2020-10-02 NOTE — CARE COORDINATION
Ambulatory Care Coordination Note  10/2/2020  CM Risk Score: 2  Charlson 10 Year Mortality Risk Score: 47%     ACC: Nicki Holly RN    Summary Note: F/U call to pt. Pt agreeable to work with ACM due to concerns related to ehr safety/fall. Per pt she is having \"tremendous jerking\": Pt stated a CVA was discovered on her CT Scan. AC encouraged pt to resting tremors (pt is L handed) right arm now being effected. Pt is wanting a second opinion (Neurology)- Pt needs second opinion to be a [facility] Neurologist- affecting (tremors) her ability to walk/function- pt is wanting guidance- unsteady. Not currently using a cane/walker/rollator, Started vacuuming and fell into her dresser yesterday- hurt arm/knee- fell. Experiencing issues with her dexterity and ambulation per pt. Per pt she is unable to cook due to tremors- eating frozen. Pt state she has a good family support with her son, daughter-in-law and grandson. Pt usually lives alone and now her grandson is staying with her until a plan is in place. Son/daughterinlaw went grocery shopping for pt yesterday. - Pt stated her concern along with her tremors and issues with her arms/tremors and walking include being told he \"had a stroke\" (72 hrs prior to CT) per pt she was advised of this by ED Provider. 154/90? (90-whatever)- normally BP is low that can cause concern per pt or others caring for pt. Pt does not have a BP cuff to use at home. Medications reviewed. - Pt has Medicare/[facility] insurance per pt    Message sent to PCP with questions/concerns/request.     Plan: F/U with pt after OV next week. Prior to Admission medications    Medication Sig Start Date End Date Taking?  Authorizing Provider   primidone (MYSOLINE) 50 MG tablet Take 1 tablet in the morning, 1 tablet at noon and 2 tablets at night 5/26/20  Yes Reggie Greenwood MD   SUMAtriptan (IMITREX) 100 MG tablet TAKE 1 TABLET BY MOUTH FOR 1 DOSE AS NEEDED FOR MIGRAINE 5/6/20  Yes Nolan Carter MD Cholecalciferol (VITAMIN D PO) Take by mouth   Yes Historical Provider, MD   Ascorbic Acid (VITAMIN C PO) Take by mouth   Yes Historical Provider, MD   lidocaine (LIDODERM) 5 % Place 1 patch onto the skin daily 12 hours on, 12 hours off.   Patient not taking: Reported on 10/2/2020 4/29/19   Adelia Baxter MD       Future Appointments   Date Time Provider V[address]   10/5/2020 10:30 AM Adelia Baxter MD [facility]   11/16/2020 10:30 AM Sada Quezada MD  NEURO [facility]       Soni RUSTN, RN Care Manager  [phone] [address] [facility],  [address] [place] [address] Primary Care

## 2020-10-02 NOTE — CARE COORDINATION
F/u call to pt to provide name of Neurologist (2nd opinion)    [address] [place]   [address] [address] [place] [address]   Phone: [phone]     Pt voiced appreciation for f/u call. Will plan on speaking with pt on Monday after her OV with PCP.

## 2020-10-05 ENCOUNTER — OFFICE VISIT (OUTPATIENT)
Dept: FAMILY MEDICINE CLINIC | Age: 70
End: 2020-10-05
Payer: MEDICARE

## 2020-10-05 VITALS
BODY MASS INDEX: 22.86 KG/M2 | SYSTOLIC BLOOD PRESSURE: 112 MMHG | DIASTOLIC BLOOD PRESSURE: 80 MMHG | WEIGHT: 125 LBS | HEART RATE: 71 BPM | OXYGEN SATURATION: 98 %

## 2020-10-05 PROCEDURE — 4004F PT TOBACCO SCREEN RCVD TLK: CPT | Performed by: FAMILY MEDICINE

## 2020-10-05 PROCEDURE — 99214 OFFICE O/P EST MOD 30 MIN: CPT | Performed by: FAMILY MEDICINE

## 2020-10-05 PROCEDURE — 1090F PRES/ABSN URINE INCON ASSESS: CPT | Performed by: FAMILY MEDICINE

## 2020-10-05 PROCEDURE — 1123F ACP DISCUSS/DSCN MKR DOCD: CPT | Performed by: FAMILY MEDICINE

## 2020-10-05 PROCEDURE — 3017F COLORECTAL CA SCREEN DOC REV: CPT | Performed by: FAMILY MEDICINE

## 2020-10-05 PROCEDURE — G8420 CALC BMI NORM PARAMETERS: HCPCS | Performed by: FAMILY MEDICINE

## 2020-10-05 PROCEDURE — G8427 DOCREV CUR MEDS BY ELIG CLIN: HCPCS | Performed by: FAMILY MEDICINE

## 2020-10-05 PROCEDURE — G8399 PT W/DXA RESULTS DOCUMENT: HCPCS | Performed by: FAMILY MEDICINE

## 2020-10-05 PROCEDURE — 4040F PNEUMOC VAC/ADMIN/RCVD: CPT | Performed by: FAMILY MEDICINE

## 2020-10-05 PROCEDURE — G8484 FLU IMMUNIZE NO ADMIN: HCPCS | Performed by: FAMILY MEDICINE

## 2020-10-05 RX ORDER — ATORVASTATIN CALCIUM 20 MG/1
20 TABLET, FILM COATED ORAL DAILY
Qty: 30 TABLET | Refills: 3 | Status: SHIPPED | OUTPATIENT
Start: 2020-10-05 | End: 2021-02-02 | Stop reason: SDUPTHER

## 2020-10-05 RX ORDER — SUMATRIPTAN 100 MG/1
TABLET, FILM COATED ORAL
Qty: 9 TABLET | Refills: 2 | Status: SHIPPED | OUTPATIENT
Start: 2020-10-05 | End: 2021-02-02 | Stop reason: SDUPTHER

## 2020-10-05 ASSESSMENT — PATIENT HEALTH QUESTIONNAIRE - PHQ9
SUM OF ALL RESPONSES TO PHQ QUESTIONS 1-9: 0
SUM OF ALL RESPONSES TO PHQ QUESTIONS 1-9: 0
2. FEELING DOWN, DEPRESSED OR HOPELESS: 0
1. LITTLE INTEREST OR PLEASURE IN DOING THINGS: 0
SUM OF ALL RESPONSES TO PHQ9 QUESTIONS 1 & 2: 0

## 2020-10-05 NOTE — PROGRESS NOTES
Chante Leblanc is a 68 y.o. female. HPI: For emergency room follow-up. Tripped over her vacuum cord and landed face first into a dresser on October 1  Went to the ER with bruises and bumps and forehead laceration  Got her tetanus shot for sutures to the forehead lack  X-rays of her joints were negative CAT scan of the head and neck were negative except for a remote basal ganglier infarct noted and degenerative disc disease noted in the neck  The stitches in her forehead are driving her crazy and she wants them removed today  Still having mild headache  Did not have any loss of consciousness denies no nausea or vomiting  Left elbow and right knee are still bruised and sore right eye is black and blue but no blurry vision  Meds, vitamins and allergies reviewed with pt    ROS: No TIA's or unusual headaches, no dysphagia. No prolonged cough. No dyspnea or chest pain on exertion. No abdominal pain, change in bowel habits, black or bloody stools. No urinary tract symptoms. No new or unusual musculoskeletal symptoms. Prior to Visit Medications    Medication Sig Taking? Authorizing Provider   atorvastatin (LIPITOR) 20 MG tablet Take 1 tablet by mouth daily Yes Shanice Ocasio MD   SUMAtriptan (IMITREX) 100 MG tablet TAKE 1 TABLET BY MOUTH FOR 1 DOSE AS NEEDED FOR MIGRAINE Yes Shanice Ocasio MD   primidone (MYSOLINE) 50 MG tablet Take 1 tablet in the morning, 1 tablet at noon and 2 tablets at night Yes Violeta Melissa MD   lidocaine (LIDODERM) 5 % Place 1 patch onto the skin daily 12 hours on, 12 hours off.  Yes Shanice Ocasio MD   Cholecalciferol (VITAMIN D PO) Take by mouth Yes Historical Provider, MD   Ascorbic Acid (VITAMIN C PO) Take by mouth Yes Historical Provider, MD       Past Medical History:   Diagnosis Date    Acne     [facility] [address]     Cellulitis     COPD (chronic obstructive pulmonary disease) ([facility] [address] [facility]     Essential tremor     GERD (gastroesophageal reflux disease)    

## 2020-10-06 ENCOUNTER — CARE COORDINATION (OUTPATIENT)
Dept: CARE COORDINATION | Age: 70
End: 2020-10-06

## 2020-10-06 NOTE — CARE COORDINATION
Ambulatory Care Coordination Note  10/6/2020  CM Risk Score: 2  Charlson 10 Year Mortality Risk Score: 47%     ACC: Pamela Randolph RN    Summary Note: F/U call after OV with PCP to ensure no further questions or concerns. Appears per PCP note pt will be starting Lipitor for Stroke Prevention, start ASA 81 mg daily, plan for Dexa scan (last 3 yrs ago) and continue vit D and Calcium. Pt voiced she had a great OV and all her questions were answered, she has tests ordered and she was prescribed a medication to help prevent CVA's. Pt agreeable to have additional outreach in next 3-4 weeks. Pt voiced appreciation for f/u call. Care Coordination Interventions    Program Enrollment:  Complex Care  Referral from Primary Care Provider:  No  Suggested Interventions and Community Resources  Fall Risk Prevention: In Process  Disease Specific Clinic:  Completed (Comment: see Care Team)  Home Health Services:  Not Started  Meals on Wheels:  Not Started (Comment: son/daughter in law or grandson go shopping for pt when needed)  Medication Assistance Program:  Declined  Medi Set or Pill Pack:  Declined  Occupational Therapy:  Not Started  Senior Services:  Declined (Comment: stated her son and daughter in law help her when needed)  Social Work:  Declined  Transportation Support:  Not Started         Goals Addressed    None         Prior to Admission medications    Medication Sig Start Date End Date Taking?  Authorizing Provider   atorvastatin (LIPITOR) 20 MG tablet Take 1 tablet by mouth daily 10/5/20   Carmelina Richardson MD   SUMAtriptan (IMITREX) 100 MG tablet TAKE 1 TABLET BY MOUTH FOR 1 DOSE AS NEEDED FOR MIGRAINE 10/5/20   Carmelina Richardson MD   primidone (MYSOLINE) 50 MG tablet Take 1 tablet in the morning, 1 tablet at noon and 2 tablets at night 5/26/20   Maritza Tobias MD   lidocaine (LIDODERM) 5 % Place 1 patch onto the skin daily 12 hours on, 12 hours off. 4/29/19   Carmelina Richardson MD   Cholecalciferol (VITAMIN D PO) Take by mouth    Historical Provider, MD   Ascorbic Acid (VITAMIN C PO) Take by mouth    Historical Provider, MD       Future Appointments   Date Time Provider Víctor Porter   11/16/2020 10:30 AM Violeta Melissa MD [facility]

## 2020-11-02 ENCOUNTER — CARE COORDINATION (OUTPATIENT)
Dept: CARE COORDINATION | Age: 70
End: 2020-11-02

## 2020-11-02 NOTE — CARE COORDINATION
F/U call attempted. Unable to reach pt or leave message. Pt returned call stating she is doing well and has no questions or concerns. Pt pleasantly declined any further needs at this time. Pt aware ACM is available if any non urgent questions arise. No further outreach at this time.      FU appts/Provider:    Future Appointments   Date Time Provider Víctor Porter   11/16/2020 10:30 AM Cristina Peña MD [facility]

## 2021-01-20 ENCOUNTER — TELEPHONE (OUTPATIENT)
Dept: FAMILY MEDICINE CLINIC | Age: 71
End: 2021-01-20

## 2021-01-20 RX ORDER — METHYLPREDNISOLONE 4 MG/1
TABLET ORAL
Qty: 1 KIT | Refills: 0 | Status: SHIPPED | OUTPATIENT
Start: 2021-01-20 | End: 2021-01-26

## 2021-01-29 ENCOUNTER — TELEPHONE (OUTPATIENT)
Dept: FAMILY MEDICINE CLINIC | Age: 71
End: 2021-01-29

## 2021-01-29 DIAGNOSIS — M54.50 ACUTE LOW BACK PAIN, UNSPECIFIED BACK PAIN LATERALITY, UNSPECIFIED WHETHER SCIATICA PRESENT: Primary | ICD-10-CM

## 2021-01-29 RX ORDER — HYDROCODONE BITARTRATE AND ACETAMINOPHEN 5; 325 MG/1; MG/1
1 TABLET ORAL EVERY 4 HOURS PRN
Qty: 30 TABLET | Refills: 0 | Status: SHIPPED | OUTPATIENT
Start: 2021-01-29 | End: 2021-02-03

## 2021-02-02 ENCOUNTER — TELEPHONE (OUTPATIENT)
Dept: FAMILY MEDICINE CLINIC | Age: 71
End: 2021-02-02

## 2021-02-02 ENCOUNTER — HOSPITAL ENCOUNTER (OUTPATIENT)
Dept: MRI IMAGING | Age: 71
Discharge: HOME | End: 2021-02-02
Payer: MEDICARE

## 2021-02-02 ENCOUNTER — HOSPITAL ENCOUNTER (OUTPATIENT)
Dept: GENERAL RADIOLOGY | Age: 71
Discharge: HOME | End: 2021-02-02
Payer: MEDICARE

## 2021-02-02 ENCOUNTER — OFFICE VISIT (OUTPATIENT)
Dept: FAMILY MEDICINE CLINIC | Age: 71
End: 2021-02-02
Payer: MEDICARE

## 2021-02-02 ENCOUNTER — HOSPITAL ENCOUNTER (OUTPATIENT)
Age: 71
Discharge: HOME | End: 2021-02-02
Payer: MEDICARE

## 2021-02-02 VITALS
WEIGHT: 122.2 LBS | BODY MASS INDEX: 22.49 KG/M2 | TEMPERATURE: 97.2 F | DIASTOLIC BLOOD PRESSURE: 78 MMHG | HEART RATE: 97 BPM | SYSTOLIC BLOOD PRESSURE: 124 MMHG | HEIGHT: 62 IN | OXYGEN SATURATION: 99 %

## 2021-02-02 DIAGNOSIS — S32.010A COMPRESSION FRACTURE OF L1 VERTEBRA, INITIAL ENCOUNTER (HCC): Primary | ICD-10-CM

## 2021-02-02 DIAGNOSIS — M48.56XA NONTRAUMATIC COMPRESSION FRACTURE OF L1 VERTEBRA, INITIAL ENCOUNTER (HCC): Primary | ICD-10-CM

## 2021-02-02 DIAGNOSIS — M54.42 ACUTE MIDLINE LOW BACK PAIN WITH BILATERAL SCIATICA: Primary | ICD-10-CM

## 2021-02-02 DIAGNOSIS — M54.42 ACUTE MIDLINE LOW BACK PAIN WITH BILATERAL SCIATICA: ICD-10-CM

## 2021-02-02 DIAGNOSIS — M54.41 ACUTE MIDLINE LOW BACK PAIN WITH BILATERAL SCIATICA: Primary | ICD-10-CM

## 2021-02-02 DIAGNOSIS — S32.010A COMPRESSION FRACTURE OF L1 VERTEBRA, INITIAL ENCOUNTER (HCC): ICD-10-CM

## 2021-02-02 DIAGNOSIS — Z00.00 WELL ADULT EXAM: Primary | ICD-10-CM

## 2021-02-02 DIAGNOSIS — M54.41 ACUTE MIDLINE LOW BACK PAIN WITH BILATERAL SCIATICA: ICD-10-CM

## 2021-02-02 PROCEDURE — 3017F COLORECTAL CA SCREEN DOC REV: CPT | Performed by: NURSE PRACTITIONER

## 2021-02-02 PROCEDURE — 72148 MRI LUMBAR SPINE W/O DYE: CPT

## 2021-02-02 PROCEDURE — 72100 X-RAY EXAM L-S SPINE 2/3 VWS: CPT

## 2021-02-02 PROCEDURE — 4040F PNEUMOC VAC/ADMIN/RCVD: CPT | Performed by: NURSE PRACTITIONER

## 2021-02-02 PROCEDURE — G8427 DOCREV CUR MEDS BY ELIG CLIN: HCPCS | Performed by: NURSE PRACTITIONER

## 2021-02-02 PROCEDURE — 1123F ACP DISCUSS/DSCN MKR DOCD: CPT | Performed by: NURSE PRACTITIONER

## 2021-02-02 PROCEDURE — 4004F PT TOBACCO SCREEN RCVD TLK: CPT | Performed by: NURSE PRACTITIONER

## 2021-02-02 PROCEDURE — G8420 CALC BMI NORM PARAMETERS: HCPCS | Performed by: NURSE PRACTITIONER

## 2021-02-02 PROCEDURE — 99213 OFFICE O/P EST LOW 20 MIN: CPT | Performed by: NURSE PRACTITIONER

## 2021-02-02 PROCEDURE — G8484 FLU IMMUNIZE NO ADMIN: HCPCS | Performed by: NURSE PRACTITIONER

## 2021-02-02 PROCEDURE — 1090F PRES/ABSN URINE INCON ASSESS: CPT | Performed by: NURSE PRACTITIONER

## 2021-02-02 PROCEDURE — 72072 X-RAY EXAM THORAC SPINE 3VWS: CPT

## 2021-02-02 PROCEDURE — G8399 PT W/DXA RESULTS DOCUMENT: HCPCS | Performed by: NURSE PRACTITIONER

## 2021-02-02 RX ORDER — SUMATRIPTAN 100 MG/1
TABLET, FILM COATED ORAL
Qty: 9 TABLET | Refills: 2 | Status: SHIPPED | OUTPATIENT
Start: 2021-02-02 | End: 2021-04-21 | Stop reason: SDUPTHER

## 2021-02-02 RX ORDER — ATORVASTATIN CALCIUM 20 MG/1
20 TABLET, FILM COATED ORAL DAILY
Qty: 90 TABLET | Refills: 3 | Status: SHIPPED | OUTPATIENT
Start: 2021-02-02 | End: 2021-03-05 | Stop reason: SDUPTHER

## 2021-02-02 RX ORDER — METHYLPREDNISOLONE 4 MG/1
TABLET ORAL
Qty: 1 KIT | Refills: 0 | Status: SHIPPED | OUTPATIENT
Start: 2021-02-02 | End: 2021-02-08

## 2021-02-02 RX ORDER — ATORVASTATIN CALCIUM 20 MG/1
20 TABLET, FILM COATED ORAL DAILY
Qty: 30 TABLET | Refills: 3 | Status: SHIPPED | OUTPATIENT
Start: 2021-02-02 | End: 2021-02-02

## 2021-02-02 ASSESSMENT — ENCOUNTER SYMPTOMS
WHEEZING: 0
GASTROINTESTINAL NEGATIVE: 1
COUGH: 0
SHORTNESS OF BREATH: 0
CHEST TIGHTNESS: 0
BOWEL INCONTINENCE: 0
BACK PAIN: 1

## 2021-02-02 ASSESSMENT — PATIENT HEALTH QUESTIONNAIRE - PHQ9
SUM OF ALL RESPONSES TO PHQ QUESTIONS 1-9: 0
SUM OF ALL RESPONSES TO PHQ9 QUESTIONS 1 & 2: 0

## 2021-02-02 NOTE — TELEPHONE ENCOUNTER
Shaun Nuñez, you were correct about patient having a probable compression fracture.   Please give patient a call as she is waiting to hear about the results

## 2021-02-02 NOTE — TELEPHONE ENCOUNTER
Pt states she received 2 phone calls from [facility] while she was at the hospital getting Xrays that were ordered.  Please contact pt to advise what phone calls were regarding

## 2021-02-02 NOTE — TELEPHONE ENCOUNTER
Impression   Acute to subacute inferior endplate fracture of L1 resulting in 33% loss of   vertebral body height.

## 2021-02-02 NOTE — PATIENT INSTRUCTIONS

## 2021-02-02 NOTE — TELEPHONE ENCOUNTER
Tiffanie with [facility] called with results from the patient's Thoracic Spine x-ray. No one was available to speak with her.     Please contact her at [phone]

## 2021-02-02 NOTE — PROGRESS NOTES
2021     Ayaka Miller (:  1950) is a 65 y.o. female, here for evaluation of the following medical concerns:    Chief Complaint   Patient presents with    Back Pain     lower back pain goes down to legs, difficult walking        Back Pain  This is a new problem. The current episode started 1 to 4 weeks ago. The problem occurs constantly. The problem is unchanged. The pain is present in the thoracic spine, lumbar spine and gluteal. The quality of the pain is described as aching and shooting. The pain radiates to the left thigh and right thigh. The pain is at a severity of 10/10. The pain is severe. The pain is the same all the time. The symptoms are aggravated by lying down, sitting and standing. Pertinent negatives include no bladder incontinence, bowel incontinence, chest pain, fever, headaches, numbness, paresis, paresthesias, perianal numbness, tingling or weakness. Risk factors include history of osteoporosis. She has tried NSAIDs and analgesics for the symptoms. The treatment provided mild relief. patient states this started when she bent over to put a ham in the oven. She has been in severe pain since this episode. Review of Systems   Constitutional: Negative for fatigue and fever. Respiratory: Negative for cough, chest tightness, shortness of breath and wheezing. Cardiovascular: Negative for chest pain, palpitations and leg swelling. Gastrointestinal: Negative. Negative for bowel incontinence. Genitourinary: Negative. Negative for bladder incontinence. Musculoskeletal: Positive for back pain. Skin: Negative. Neurological: Negative for dizziness, tingling, weakness, numbness, headaches and paresthesias. Prior to Visit Medications    Medication Sig Taking?  Authorizing Provider   atorvastatin (LIPITOR) 20 MG tablet Take 1 tablet by mouth daily Yes MOIRA Jones - CNP SUMAtriptan (IMITREX) 100 MG tablet TAKE 1 TABLET BY MOUTH FOR 1 DOSE AS NEEDED FOR MIGRAINE Yes MOIRA Cat CNP   methylPREDNISolone (MEDROL DOSEPACK) 4 MG tablet Take by mouth. Yes MOIRA Cat CNP   HYDROcodone-acetaminophen (NORCO) 5-325 MG per tablet Take 1 tablet by mouth every 4 hours as needed for Pain for up to 5 days. Intended supply: 5 days. Take lowest dose possible to manage pain Yes Arthur Zuñiga MD   Cholecalciferol (VITAMIN D PO) Take by mouth Yes Historical Provider, MD   Ascorbic Acid (VITAMIN C PO) Take by mouth Yes Historical Provider, MD   primidone (MYSOLINE) 50 MG tablet Take 1 tablet in the morning, 1 tablet at noon and 2 tablets at night  Patient not taking: Reported on 2/2/2021  Maryan Mandel MD   lidocaine (LIDODERM) 5 % Place 1 patch onto the skin daily 12 hours on, 12 hours off. Patient not taking: Reported on 2/2/2021  Arthur Zuñiga MD        Social History     Tobacco Use    Smoking status: Current Some Day Smoker     Packs/day: 1.00     Years: 30.00     Pack years: 30.00     Types: Cigarettes     Last attempt to quit: 3/1/2010     Years since quitting: 10.9    Smokeless tobacco: Never Used    Tobacco comment: H.O.smoking 1 p.p.d x 30+ yrs / Quit 3/1/2010   Substance Use Topics    Alcohol use: Yes     Comment: 1 beer/mo.  Drug use: Never        Vitals:    02/02/21 0932   BP: 124/78   Pulse: 97   Temp: 97.2 °F (36.2 °C)   SpO2: 99%   Weight: 122 lb 3.2 oz (55.4 kg)   Height: 5' 2\" (1.575 m)     Estimated body mass index is 22.35 kg/m² as calculated from the following:    Height as of this encounter: 5' 2\" (1.575 m). Weight as of this encounter: 122 lb 3.2 oz (55.4 kg). Physical Exam  Vitals signs and nursing note reviewed. Constitutional:       General: She is awake. She is in acute distress. Appearance: Normal appearance. She is well-developed, well-groomed and normal weight. She is not ill-appearing, toxic-appearing or diaphoretic. Cardiovascular:      Rate and Rhythm: Normal rate and regular rhythm. Heart sounds: Normal heart sounds, S1 normal and S2 normal. No murmur. Pulmonary:      Effort: Pulmonary effort is normal.      Breath sounds: Normal breath sounds. Abdominal:      General: Abdomen is flat. Bowel sounds are normal.      Palpations: Abdomen is soft. Musculoskeletal:         General: Tenderness present. Lumbar back: She exhibits decreased range of motion, tenderness and pain. She exhibits no bony tenderness, no swelling, no edema, no deformity, no laceration, no spasm and normal pulse. Right lower leg: No edema. Left lower leg: No edema. Skin:     General: Skin is warm and dry. Capillary Refill: Capillary refill takes less than 2 seconds. Neurological:      General: No focal deficit present. Mental Status: She is alert and oriented to person, place, and time. Psychiatric:         Mood and Affect: Mood normal.         Behavior: Behavior is cooperative. ASSESSMENT/PLAN:  1. Acute midline low back pain with bilateral sciatica  Patient appears in pain  Moving from sitting to standing and back to sitting multiple times. Start steroid pack  Continue pain medication as needed. - XR LUMBAR SPINE (2-3 VIEWS); Future  - XR THORACIC SPINE (3 VIEWS); Future  - methylPREDNISolone (MEDROL DOSEPACK) 4 MG tablet; Take by mouth. Dispense: 1 kit; Refill: 0      Return in about 1 week (around 2/9/2021).

## 2021-02-02 NOTE — TELEPHONE ENCOUNTER
Please let the patient know she will need to have lab work completed in the next few weeks. The orders have been placed in the system. She just needs to stop in the office and be fasting for 8 hours prior.

## 2021-02-04 ENCOUNTER — TELEPHONE (OUTPATIENT)
Dept: FAMILY MEDICINE CLINIC | Age: 71
End: 2021-02-04

## 2021-02-04 NOTE — TELEPHONE ENCOUNTER
Spoke to pt she states that she was told by [facility] to get a neurosurgeon involved after her recent x-ray and MRI. She said that she hasn't been contacted by the neurosurgery group or seen them.

## 2021-02-05 DIAGNOSIS — S32.010A COMPRESSION FRACTURE OF L1 VERTEBRA, INITIAL ENCOUNTER (HCC): Primary | ICD-10-CM

## 2021-02-05 RX ORDER — DOCUSATE SODIUM 100 MG/1
100 CAPSULE, LIQUID FILLED ORAL 2 TIMES DAILY
Qty: 60 CAPSULE | Refills: 0 | Status: SHIPPED | OUTPATIENT
Start: 2021-02-05 | End: 2021-03-07

## 2021-02-05 RX ORDER — HYDROCODONE BITARTRATE AND ACETAMINOPHEN 5; 325 MG/1; MG/1
1 TABLET ORAL EVERY 6 HOURS PRN
Qty: 20 TABLET | Refills: 0 | Status: SHIPPED | OUTPATIENT
Start: 2021-02-05 | End: 2021-02-14 | Stop reason: SDUPTHER

## 2021-02-05 ASSESSMENT — ENCOUNTER SYMPTOMS
CHEST TIGHTNESS: 0
WHEEZING: 0
BACK PAIN: 1
SHORTNESS OF BREATH: 0
COUGH: 0

## 2021-02-05 NOTE — PROGRESS NOTES
2021      TELEHEALTH EVALUATION -- Audio/Visual (During WZMZD-60 public health emergency)    MARGARET Burns (:  1950) has requested an audio/video evaluation for the following concern(s):    Patient is having continued pain and has not been schedule with the spine surgeon for her compression fracture. She states she is almost out of pain medication and is concerned she will run out going into the weekend. Review of Systems   Constitutional: Negative for fatigue and fever. Respiratory: Negative for cough, chest tightness, shortness of breath and wheezing. Cardiovascular: Negative for chest pain and palpitations. Musculoskeletal: Positive for back pain. Neurological: Negative for dizziness. Prior to Visit Medications    Medication Sig Taking? Authorizing Provider   HYDROcodone-acetaminophen (LORTAB) 5-325 MG per tablet Take 1 tablet by mouth every 6 hours as needed for Pain for up to 5 days. Intended supply: 5 days. Take lowest dose possible to manage pain Yes MOIRA Adams CNP   docusate sodium (COLACE) 100 MG capsule Take 1 capsule by mouth 2 times daily Yes MOIRA Adams CNP   SUMAtriptan (IMITREX) 100 MG tablet TAKE 1 TABLET BY MOUTH FOR 1 DOSE AS NEEDED FOR MIGRAINE  MOIRA Adams CNP   methylPREDNISolone (MEDROL DOSEPACK) 4 MG tablet Take by mouth. MOIRA Adams CNP   atorvastatin (LIPITOR) 20 MG tablet TAKE 1 TABLET BY MOUTH DAILY  MOIRA Adams CNP   primidone (MYSOLINE) 50 MG tablet Take 1 tablet in the morning, 1 tablet at noon and 2 tablets at night  Patient not taking: Reported on 2021  Chuck Zaragoza MD   lidocaine (LIDODERM) 5 % Place 1 patch onto the skin daily 12 hours on, 12 hours off.   Patient not taking: Reported on 2021  Mallorie Lang MD   Cholecalciferol (VITAMIN D PO) Take by mouth  Historical Provider, MD   Ascorbic Acid (VITAMIN C PO) Take by mouth  Historical Provider, MD       Past Medical History: Diagnosis Date    Acne     Breast cancer ([facility] [address]     Cellulitis     COPD (chronic obstructive pulmonary disease) (HCC)     Essential tremor     GERD (gastroesophageal reflux disease)     Hyperlipidemia     Osteoporosis     Polyp of colon        Past Surgical History:   Procedure Laterality Date    APPENDECTOMY      BREAST SURGERY      COLONOSCOPY N/A 5/2/2019    COLONOSCOPY POLYPECTOMY SNARE/COLD BIOPSY performed by Camilo Christopher MD at [facility]      EYE SURGERY Right 3/21/15    macular \"hole\"    HAND SURGERY      MASTECTOMY, BILATERAL Bilateral 1985    ORIF DISTAL RADIUS FRACTURE Right 9/10/2019    RIGHT DISTAL RADIUS OPEN REDUCTION INTERNAL FIXATION -STYKER performed by Jaguar Jim[facility]       Family History   Problem Relation Age of Onset    Heart Disease Mother        Allergies   Allergen Reactions    Vicodin [Hydrocodone-Acetaminophen] Shortness Of Breath    Molds & Smuts Other (See Comments)     congestion    Montelukast Sodium Other (See Comments)     Vision issues    Pollen Extract Other (See Comments)     congestion       Social History     Tobacco Use    Smoking status: Current Some Day Smoker     Packs/day: 1.00     Years: 30.00     Pack years: 30.00     Types: Cigarettes     Last attempt to quit: 3/1/2010     Years since quitting: 10.9    Smokeless tobacco: Never Used    Tobacco comment: H.O.smoking 1 p.p.d x 30+ yrs / Quit 3/1/2010   Substance Use Topics    Alcohol use: Yes     Comment: 1 beer/mo.  Drug use: Never          PHYSICAL EXAMINATION:  Vital Signs: (As obtained by patient/caregiver or practitioner observation)  There were no vitals taken for this visit. Respiratory rate appears normal 16/minute  Constitutional: Appears well-developed and well-nourished. No apparent distress    Mental status: Alert and awake. Oriented to person/place/tomeka.  Able to follow commands Eyes: EOM normal. Sclera normal. No discharge visible  HENT: Normocephalic, atraumatic. Mouth/Throat: Mucous membranes are moist. External Ears Normal    Neck: No visualized mass   Pulmonary/Chest: Respiratory effort normal.  No visualized signs of difficulty breathing or respiratory distress        Musculoskeletal:  Normal range of motion of neck  Neurological:       No Facial Asymmetry (Cranial nerve 7 motor function) (limited exam to video visit). No gaze palsy       Skin:  No significant exanthematous lesions or discoloration noted on facial skin       Psychiatric: Normal Affect. No Hallucinations            ASSESSMENT/PLAN:  1. Compression fracture of L1 vertebra, initial encounter ([facility])  Refill of medication  - HYDROcodone-acetaminophen (LORTAB) 5-325 MG per tablet; Take 1 tablet by mouth every 6 hours as needed for Pain for up to 5 days. Intended supply: 5 days. Take lowest dose possible to manage pain  Dispense: 20 tablet; Refill: 0  - docusate sodium (COLACE) 100 MG capsule; Take 1 capsule by mouth 2 times daily  Dispense: 60 capsule; Refill: 0      No follow-ups on file. Darya Villanueva is a 65 y.o. female being evaluated by a Virtual Visit (video visit) encounter to address concerns as mentioned above. A caregiver was present when appropriate. Due to this being a TeleHealth encounter (During ORBOX-10 public health emergency), evaluation of the following organ systems was limited: Vitals/Constitutional/EENT/Resp/CV/GI//MS/Neuro/Skin/Heme-Lymph-Imm. Pursuant to the emergency declaration under the [address] [place] [address], [address] [place] [address] authority and the [employer] Act, this Virtual Visit was conducted with patient's (and/or legal guardian's) consent, to reduce the patient's risk of exposure to COVID-19 and provide necessary medical care. The patient (and/or legal guardian) has also been advised to contact this office for worsening conditions or problems, and seek emergency medical treatment and/or call 911 if deemed necessary. Patient identification was verified at the start of the visit: Yes    Total time spent on this encounter: 20 minutes    Services were provided through a video synchronous discussion virtually to substitute for in-person clinic visit. Patient and provider were located at their individual homes. --MOIRA May CNP on 2/5/2021 at 2:16 PM    An electronic signature was used to authenticate this note. Renee Eng

## 2021-02-12 DIAGNOSIS — S32.010A COMPRESSION FRACTURE OF L1 VERTEBRA, INITIAL ENCOUNTER (HCC): ICD-10-CM

## 2021-02-12 NOTE — TELEPHONE ENCOUNTER
Medication and Quantity requested: HYDROcodone-acetaminophen (LORTAB) 5-325 MG per tablet-QTY. 20 tablets    Patient concerned that this will not hold her over until her appointment at the [address] [place] [address] on 3/2/21. She has about 3-4 left and is in a lot of pain.     She is also scheduled for a DexaScan on 2/22/21         Last Visit  2/2/21    Pharmacy and phone number updated in [facility]:  yes  James

## 2021-02-14 RX ORDER — HYDROCODONE BITARTRATE AND ACETAMINOPHEN 5; 325 MG/1; MG/1
1 TABLET ORAL EVERY 6 HOURS PRN
Qty: 20 TABLET | Refills: 0 | Status: SHIPPED | OUTPATIENT
Start: 2021-02-14 | End: 2021-02-22 | Stop reason: SDUPTHER

## 2021-02-22 ENCOUNTER — HOSPITAL ENCOUNTER (OUTPATIENT)
Dept: GENERAL RADIOLOGY | Age: 71
Discharge: HOME | End: 2021-02-22
Payer: MEDICARE

## 2021-02-22 DIAGNOSIS — M81.0 AGE-RELATED OSTEOPOROSIS WITHOUT CURRENT PATHOLOGICAL FRACTURE: ICD-10-CM

## 2021-02-22 DIAGNOSIS — S32.010A COMPRESSION FRACTURE OF L1 VERTEBRA, INITIAL ENCOUNTER (HCC): ICD-10-CM

## 2021-02-22 PROCEDURE — 77080 DXA BONE DENSITY AXIAL: CPT

## 2021-02-22 RX ORDER — HYDROCODONE BITARTRATE AND ACETAMINOPHEN 5; 325 MG/1; MG/1
1 TABLET ORAL EVERY 6 HOURS PRN
Qty: 20 TABLET | Refills: 0 | Status: SHIPPED | OUTPATIENT
Start: 2021-02-22 | End: 2021-02-26 | Stop reason: SDUPTHER

## 2021-02-22 NOTE — TELEPHONE ENCOUNTER
Medication and Quantity requested: HYDROcodone-acetaminophen (LORTAB) 5-325 MG per tablet [        Last Visit  2/2/21    Pharmacy and phone number updated in [facility]:  Yes  James

## 2021-02-23 ENCOUNTER — PATIENT MESSAGE (OUTPATIENT)
Dept: FAMILY MEDICINE CLINIC | Age: 71
End: 2021-02-23

## 2021-02-23 DIAGNOSIS — S32.010A COMPRESSION FRACTURE OF L1 VERTEBRA, INITIAL ENCOUNTER (HCC): Primary | ICD-10-CM

## 2021-02-23 DIAGNOSIS — R10.30 LOWER ABDOMINAL PAIN: Primary | ICD-10-CM

## 2021-02-23 NOTE — TELEPHONE ENCOUNTER
From: Chad Reed  To: Nova Navarrete MD  Sent: 2/23/2021 10:39 AM EST  Subject: Test Results Question    I am wondering what the DEXA Scan will mean to my well-being. If maintenance medication is necessary, please go through [facility]. I only use [facility] for short-term medication.

## 2021-02-24 RX ORDER — ALENDRONATE SODIUM 70 MG/1
70 TABLET ORAL
Qty: 4 TABLET | Refills: 5 | Status: SHIPPED | OUTPATIENT
Start: 2021-02-24 | End: 2021-02-24

## 2021-02-24 RX ORDER — ALENDRONATE SODIUM 70 MG/1
70 TABLET ORAL
Qty: 12 TABLET | Refills: 1 | Status: SHIPPED | OUTPATIENT
Start: 2021-02-24 | End: 2021-04-21 | Stop reason: SDUPTHER

## 2021-02-24 NOTE — TELEPHONE ENCOUNTER
Medication:   Requested Prescriptions     Pending Prescriptions Disp Refills    alendronate (FOSAMAX) 70 MG tablet [Pharmacy Med Name: ALENDRONATE 70MG TABLETS] 12 tablet      Sig: TAKE 1 TABLET BY MOUTH EVERY 7 DAYS        Last Filled:  2/24/21 #4, 5 RF patient is requesting 90 day supply     Patient Phone Number: [phone] (home)     Last appt: 2/2/2021   Next appt: Visit date not found    Last OARRS: No flowsheet data found.

## 2021-02-26 ENCOUNTER — TELEPHONE (OUTPATIENT)
Dept: FAMILY MEDICINE CLINIC | Age: 71
End: 2021-02-26

## 2021-02-26 DIAGNOSIS — S32.010A COMPRESSION FRACTURE OF L1 VERTEBRA, INITIAL ENCOUNTER (HCC): ICD-10-CM

## 2021-02-26 RX ORDER — HYDROCODONE BITARTRATE AND ACETAMINOPHEN 5; 325 MG/1; MG/1
1 TABLET ORAL EVERY 6 HOURS PRN
Qty: 20 TABLET | Refills: 0 | Status: SHIPPED | OUTPATIENT
Start: 2021-02-26 | End: 2021-03-03

## 2021-02-26 NOTE — TELEPHONE ENCOUNTER
Medication and Quantity requested: HYDROcodone-acetaminophen (LORTAB) 5-325 MG per tablet-QTY.  20 tablets         Last Visit  2/2/2021    Pharmacy and phone number updated in [facility]:  yes  James

## 2021-02-26 NOTE — TELEPHONE ENCOUNTER
Patient is call to see if Lyudmila Agustin has decided whether she should go to [facility] or. [facility]-which do you recommend? Should she cancel her appointment at [address] [place] [address] and go to [facility]?

## 2021-03-01 DIAGNOSIS — M80.08XA AGE-RELATED OSTEOPOROSIS WITH CURRENT PATHOLOGICAL FRACTURE, VERTEBRA(E), INITIAL ENCOUNTER FOR FRACTURE (HCC): ICD-10-CM

## 2021-03-01 DIAGNOSIS — S32.010A COMPRESSION FRACTURE OF L1 VERTEBRA, INITIAL ENCOUNTER (HCC): Primary | ICD-10-CM

## 2021-03-01 NOTE — TELEPHONE ENCOUNTER
Please call patient and let her know that someone from [facility] should be calling her this morning to set this up.

## 2021-03-04 ENCOUNTER — HOSPITAL ENCOUNTER (OUTPATIENT)
Dept: INTERVENTIONAL RADIOLOGY/VASCULAR | Age: 71
Discharge: HOME | End: 2021-03-04
Payer: MEDICARE

## 2021-03-04 VITALS
RESPIRATION RATE: 16 BRPM | HEIGHT: 61 IN | OXYGEN SATURATION: 96 % | BODY MASS INDEX: 22.09 KG/M2 | DIASTOLIC BLOOD PRESSURE: 76 MMHG | HEART RATE: 56 BPM | SYSTOLIC BLOOD PRESSURE: 130 MMHG | WEIGHT: 117 LBS | TEMPERATURE: 98.3 F

## 2021-03-04 DIAGNOSIS — M80.08XA AGE-RELATED OSTEOPOROSIS WITH CURRENT PATHOLOGICAL FRACTURE, VERTEBRA(E), INITIAL ENCOUNTER FOR FRACTURE (HCC): ICD-10-CM

## 2021-03-04 DIAGNOSIS — S32.010A COMPRESSION FRACTURE OF L1 VERTEBRA, INITIAL ENCOUNTER (HCC): ICD-10-CM

## 2021-03-04 LAB
ANION GAP SERPL CALCULATED.3IONS-SCNC: 8 MMOL/L (ref 3–16)
APTT: 32.7 SEC (ref 24.2–36.2)
BUN BLDV-MCNC: 9 MG/DL (ref 7–20)
CALCIUM SERPL-MCNC: 9.5 MG/DL (ref 8.3–10.6)
CHLORIDE BLD-SCNC: 109 MMOL/L (ref 99–110)
CO2: 26 MMOL/L (ref 21–32)
CREAT SERPL-MCNC: 0.6 MG/DL (ref 0.6–1.2)
GFR AFRICAN AMERICAN: >60
GFR NON-AFRICAN AMERICAN: >60
GLUCOSE BLD-MCNC: 104 MG/DL (ref 70–99)
HCT VFR BLD CALC: 43.9 % (ref 36–48)
HEMOGLOBIN: 14.2 G/DL (ref 12–16)
INR BLD: 0.96 (ref 0.86–1.14)
MCH RBC QN AUTO: 31.5 PG (ref 26–34)
MCHC RBC AUTO-ENTMCNC: 32.4 G/DL (ref 31–36)
MCV RBC AUTO: 97.3 FL (ref 80–100)
PDW BLD-RTO: 12.7 % (ref 12.4–15.4)
PLATELET # BLD: 294 K/UL (ref 135–450)
PMV BLD AUTO: 7.4 FL (ref 5–10.5)
POTASSIUM SERPL-SCNC: 4.1 MMOL/L (ref 3.5–5.1)
PROTHROMBIN TIME: 11.1 SEC (ref 10–13.2)
RBC # BLD: 4.51 M/UL (ref 4–5.2)
SODIUM BLD-SCNC: 143 MMOL/L (ref 136–145)
WBC # BLD: 4.3 K/UL (ref 4–11)

## 2021-03-04 PROCEDURE — 99152 MOD SED SAME PHYS/QHP 5/>YRS: CPT

## 2021-03-04 PROCEDURE — C1894 INTRO/SHEATH, NON-LASER: HCPCS

## 2021-03-04 PROCEDURE — 22514 PERQ VERTEBRAL AUGMENTATION: CPT

## 2021-03-04 PROCEDURE — 80048 BASIC METABOLIC PNL TOTAL CA: CPT

## 2021-03-04 PROCEDURE — 2500000003 HC RX 250 WO HCPCS: Performed by: RADIOLOGY

## 2021-03-04 PROCEDURE — 2580000003 HC RX 258: Performed by: RADIOLOGY

## 2021-03-04 PROCEDURE — 36415 COLL VENOUS BLD VENIPUNCTURE: CPT

## 2021-03-04 PROCEDURE — 85730 THROMBOPLASTIN TIME PARTIAL: CPT

## 2021-03-04 PROCEDURE — 85027 COMPLETE CBC AUTOMATED: CPT

## 2021-03-04 PROCEDURE — 6360000004 HC RX CONTRAST MEDICATION: Performed by: RADIOLOGY

## 2021-03-04 PROCEDURE — 7100000010 HC PHASE II RECOVERY - FIRST 15 MIN

## 2021-03-04 PROCEDURE — 6370000000 HC RX 637 (ALT 250 FOR IP): Performed by: RADIOLOGY

## 2021-03-04 PROCEDURE — 85610 PROTHROMBIN TIME: CPT

## 2021-03-04 PROCEDURE — 6360000002 HC RX W HCPCS: Performed by: RADIOLOGY

## 2021-03-04 RX ORDER — ACETAMINOPHEN 325 MG/1
650 TABLET ORAL EVERY 4 HOURS PRN
Status: DISCONTINUED | OUTPATIENT
Start: 2021-03-04 | End: 2021-03-05 | Stop reason: HOSPADM

## 2021-03-04 RX ORDER — HYDROCODONE BITARTRATE AND ACETAMINOPHEN 5; 325 MG/1; MG/1
1 TABLET ORAL EVERY 6 HOURS PRN
COMMUNITY

## 2021-03-04 RX ORDER — ONDANSETRON 2 MG/ML
4 INJECTION INTRAMUSCULAR; INTRAVENOUS EVERY 8 HOURS PRN
Status: DISCONTINUED | OUTPATIENT
Start: 2021-03-04 | End: 2021-03-05 | Stop reason: HOSPADM

## 2021-03-04 RX ORDER — FENTANYL CITRATE 50 UG/ML
INJECTION, SOLUTION INTRAMUSCULAR; INTRAVENOUS
Status: COMPLETED | OUTPATIENT
Start: 2021-03-04 | End: 2021-03-04

## 2021-03-04 RX ORDER — DIPHENHYDRAMINE HCL 25 MG
TABLET ORAL
Status: COMPLETED | OUTPATIENT
Start: 2021-03-04 | End: 2021-03-04

## 2021-03-04 RX ORDER — MIDAZOLAM HYDROCHLORIDE 1 MG/ML
INJECTION INTRAMUSCULAR; INTRAVENOUS
Status: COMPLETED | OUTPATIENT
Start: 2021-03-04 | End: 2021-03-04

## 2021-03-04 RX ORDER — ASPIRIN 81 MG/1
81 TABLET ORAL DAILY
COMMUNITY

## 2021-03-04 RX ORDER — LIDOCAINE HYDROCHLORIDE 10 MG/ML
5 INJECTION, SOLUTION EPIDURAL; INFILTRATION; INTRACAUDAL; PERINEURAL ONCE
Status: COMPLETED | OUTPATIENT
Start: 2021-03-04 | End: 2021-03-04

## 2021-03-04 RX ORDER — BUPIVACAINE HYDROCHLORIDE 5 MG/ML
10 INJECTION, SOLUTION EPIDURAL; INTRACAUDAL
Status: COMPLETED | OUTPATIENT
Start: 2021-03-04 | End: 2021-03-04

## 2021-03-04 RX ADMIN — FENTANYL CITRATE 50 MCG: 50 INJECTION INTRAMUSCULAR; INTRAVENOUS at 09:40

## 2021-03-04 RX ADMIN — IOPAMIDOL 30 ML: 408 INJECTION, SOLUTION INTRATHECAL at 10:12

## 2021-03-04 RX ADMIN — MIDAZOLAM 1 MG: 1 INJECTION INTRAMUSCULAR; INTRAVENOUS at 09:47

## 2021-03-04 RX ADMIN — MIDAZOLAM 1 MG: 1 INJECTION INTRAMUSCULAR; INTRAVENOUS at 09:40

## 2021-03-04 RX ADMIN — CEFAZOLIN 2000 MG: 1 INJECTION, POWDER, FOR SOLUTION INTRAVENOUS at 09:39

## 2021-03-04 RX ADMIN — LIDOCAINE HYDROCHLORIDE 5 ML: 10 INJECTION, SOLUTION EPIDURAL; INFILTRATION; INTRACAUDAL; PERINEURAL at 10:12

## 2021-03-04 RX ADMIN — DIPHENHYDRAMINE HCL 50 MG: 25 TABLET ORAL at 09:39

## 2021-03-04 RX ADMIN — FENTANYL CITRATE 25 MCG: 50 INJECTION INTRAMUSCULAR; INTRAVENOUS at 09:47

## 2021-03-04 RX ADMIN — BUPIVACAINE HYDROCHLORIDE 50 MG: 5 INJECTION, SOLUTION EPIDURAL; INTRACAUDAL at 10:11

## 2021-03-04 ASSESSMENT — PAIN SCALES - GENERAL
PAINLEVEL_OUTOF10: 0
PAINLEVEL_OUTOF10: 0

## 2021-03-04 NOTE — PROGRESS NOTES
Upper left dsg to bacl with small amt of drainage noted vss equal pushes and pulls discharge in stable condition via wheelchair

## 2021-03-04 NOTE — BRIEF OP NOTE
Brief Postoperative Note    Judith Rivero  YOB: 1950  [id number]    Pre-operative Diagnosis: Compression fracture L1 Vertebrae    Post-operative Diagnosis: Same    Procedure:     Anesthesia: Local and Moderate Sedation    Surgeons/Assistants:SCOTTIE VERDE,  DOROTEO WATSON PA-C    Estimated Blood Loss: less than 5mL    Complications: None    Specimens: Was Not Obtained    Findings: Technically successful kyphoplasty of L1 Vertebrae    Electronically signed by Dominick Adam PA-C on 3/4/2021 at 10:07 AM

## 2021-03-04 NOTE — PROGRESS NOTES
Taking po well dsg to back d and I small amt of pink drainage to the upper left dsg will continue to monitor

## 2021-03-04 NOTE — PRE SEDATION
Sedation Pre-Procedure Note    Patient Name: Charlie Lynn   YOB: 1950  Room/Bed: Room/bed info not found  Medical Record Number: [id number]  Date: 3/4/2021   Time: 9:58 AM       Indication:  Compression fracture L1 vertebrae. Kyphoplasty procedure    Consent: I have discussed with the patient and/or the patient representative the indication, alternatives, and the possible risks and/or complications of the planned procedure and the anesthesia methods. The patient and/or patient representative appear to understand and agree to proceed. Vital Signs:   Vitals:    03/04/21 0955   BP: (!) 137/91   Pulse: 71   Resp: 16   Temp:    SpO2: 92%       Past Medical History:   has a past medical history of Acne, Breast cancer ([facility] [address] Cellulitis, COPD (chronic obstructive pulmonary disease) ([facility] [address] Essential tremor, GERD (gastroesophageal reflux disease), Hyperlipidemia, Osteoporosis, and Polyp of colon. Past Surgical History:   has a past surgical history that includes Appendectomy; Dilation and curettage of uterus; Breast surgery; Hand surgery; eye surgery (Right, 3/21/15); Mastectomy, bilateral (Bilateral, 1985); Colonoscopy (N/A, 5/2/2019); and ORIF DISTAL RADIUS FRACTURE (Right, 9/10/2019). Medications:   Scheduled Meds:   Continuous Infusions:   PRN Meds:   Home Meds:   Prior to Admission medications    Medication Sig Start Date End Date Taking? Authorizing Provider   aspirin 81 MG EC tablet Take 81 mg by mouth daily   Yes Historical Provider, MD   HYDROcodone-acetaminophen (NORCO) 5-325 MG per tablet Take 1 tablet by mouth every 6 hours as needed for Pain.    Yes Historical Provider, MD   atorvastatin (LIPITOR) 20 MG tablet TAKE 1 TABLET BY MOUTH DAILY 2/2/21  Yes Winifred Claude, APRN - CNP   alendronate (FOSAMAX) 70 MG tablet TAKE 1 TABLET BY MOUTH EVERY 7 DAYS 2/24/21   Luigi Gastelum MD   docusate sodium (COLACE) 100 MG capsule Take 1 capsule by mouth 2 times daily 2/5/21 3/7/21  Winifred Claude,

## 2021-03-05 RX ORDER — ATORVASTATIN CALCIUM 20 MG/1
20 TABLET, FILM COATED ORAL DAILY
Qty: 90 TABLET | Refills: 3 | Status: SHIPPED | OUTPATIENT
Start: 2021-03-05 | End: 2021-04-21 | Stop reason: SDUPTHER

## 2021-03-05 NOTE — TELEPHONE ENCOUNTER
Medication and Quantity requested: atorvastatin (LIPITOR) 20 MG tablet          Last Visit  2/2/21    Pharmacy and phone number updated in [facility]:  Yes [place]

## 2021-03-05 NOTE — PROGRESS NOTES
Spoke to patient post Kyphoplasty procedure. Patients site was clean, dry and intact. Patient denies any discomfort post procedure. Patients states pain she had prior to procedure is gone. I addressed all the patients concerns, and directed patient to contact Special Procedures if they had any further questions.

## 2021-03-11 ENCOUNTER — FOLLOWUP TELEPHONE ENCOUNTER (OUTPATIENT)
Dept: INTERVENTIONAL RADIOLOGY/VASCULAR | Age: 71
End: 2021-03-11

## 2021-03-11 NOTE — PROGRESS NOTES
Spoke to patient 1 week post Kyphoplasty procedure. Patients site was clean, dry and intact. Patient denies any states pain has improved and only slight pain at this point post procedure. I addressed all the patients concerns, and directed patient to contact Special Procedures if they had any further questions.

## 2021-04-20 ENCOUNTER — NURSE TRIAGE (OUTPATIENT)
Dept: OTHER | Facility: CLINIC | Age: 71
End: 2021-04-20

## 2021-04-20 NOTE — TELEPHONE ENCOUNTER
Please contact patient to scheduled in office visit/VV. Can she someone else today since Dr. Mitali Up is out and Dr. Amaya Silence is booked?

## 2021-04-20 NOTE — TELEPHONE ENCOUNTER
Reason for Disposition   Pimples (localized) and no improvement after using CARE ADVICE    Answer Assessment - Initial Assessment Questions  1. APPEARANCE of RASH: \"Describe the rash. \"       Scaly and itchy skin     2. LOCATION: \"Where is the rash located? \"       on forehead, temples, and in scalp    3. NUMBER: \"How many spots are there? \"       The whole area, but will also get pimples in this area and they are painful, has small ones on forehead at hairline    4. SIZE: \"How big are the spots? \" (Inches, centimeters or compare to size of a coin)       Forehead ones are very small like pen point, but in scalp can get to size of pea    5. ONSET: \"When did the rash start? \"       Few months    6. ITCHING: \"Does the rash itch? \" If so, ask: \"How bad is the itch? \"  (Scale 1-10; or mild, moderate, severe)      8/10    7. PAIN: \"Does the rash hurt? \" If so, ask: \"How bad is the pain? \"  (Scale 1-10; or mild, moderate, severe)      Pain when she has pimples pain is 9/10 before \"it bursts\"    8. OTHER SYMPTOMS: \"Do you have any other symptoms? \" (e.g., fever)      No    9. PREGNANCY: \"Is there any chance you are pregnant? \" \"When was your last menstrual period? \"      N/a    Protocols used: RASH OR REDNESS - LOCALIZED-ADULT-[place]    Received call from [address] [place] [address] at pre-service [facility] with Red Flag Complaint. Brief description of triage: rash for months on forehead, scalp and temples that is scaly and flaky    Triage indicates for patient to be seen today or tomorrow. Prefers tomorrow. Care advice provided, patient verbalizes understanding; denies any other questions or concerns; instructed to call back for any new or worsening symptoms. Writer provided warm transfer to [facility] for appointment scheduling. Attention Provider: Thank you for allowing me to participate in the care of your patient. The patient was connected to triage in response to information provided to the [facility].   Please do not respond through this encounter as the response is not directed to a shared pool.

## 2021-04-21 ENCOUNTER — OFFICE VISIT (OUTPATIENT)
Dept: FAMILY MEDICINE CLINIC | Age: 71
End: 2021-04-21
Payer: MEDICARE

## 2021-04-21 VITALS
SYSTOLIC BLOOD PRESSURE: 120 MMHG | BODY MASS INDEX: 22.79 KG/M2 | DIASTOLIC BLOOD PRESSURE: 80 MMHG | HEART RATE: 83 BPM | WEIGHT: 120.6 LBS | OXYGEN SATURATION: 98 %

## 2021-04-21 DIAGNOSIS — M20.42 HAMMERTOE OF LEFT FOOT: ICD-10-CM

## 2021-04-21 DIAGNOSIS — M81.8 OTHER OSTEOPOROSIS WITHOUT CURRENT PATHOLOGICAL FRACTURE: ICD-10-CM

## 2021-04-21 DIAGNOSIS — E78.2 MIXED HYPERLIPIDEMIA: ICD-10-CM

## 2021-04-21 DIAGNOSIS — J43.8 OTHER EMPHYSEMA (HCC): ICD-10-CM

## 2021-04-21 DIAGNOSIS — L21.9 SEBORRHEIC DERMATITIS: Primary | ICD-10-CM

## 2021-04-21 DIAGNOSIS — G43.909 MIGRAINE WITHOUT STATUS MIGRAINOSUS, NOT INTRACTABLE, UNSPECIFIED MIGRAINE TYPE: ICD-10-CM

## 2021-04-21 DIAGNOSIS — Z00.00 WELL ADULT EXAM: ICD-10-CM

## 2021-04-21 DIAGNOSIS — Z72.0 CURRENT OCCASIONAL SMOKER: ICD-10-CM

## 2021-04-21 LAB
A/G RATIO: 2.3 (ref 1.1–2.2)
ALBUMIN SERPL-MCNC: 4.4 G/DL (ref 3.4–5)
ALP BLD-CCNC: 60 U/L (ref 40–129)
ALT SERPL-CCNC: 13 U/L (ref 10–40)
ANION GAP SERPL CALCULATED.3IONS-SCNC: 11 MMOL/L (ref 3–16)
AST SERPL-CCNC: 13 U/L (ref 15–37)
BASOPHILS ABSOLUTE: 0 K/UL (ref 0–0.2)
BASOPHILS RELATIVE PERCENT: 0.7 %
BILIRUB SERPL-MCNC: 0.8 MG/DL (ref 0–1)
BUN BLDV-MCNC: 11 MG/DL (ref 7–20)
CALCIUM SERPL-MCNC: 9.3 MG/DL (ref 8.3–10.6)
CHLORIDE BLD-SCNC: 106 MMOL/L (ref 99–110)
CHOLESTEROL, FASTING: 164 MG/DL (ref 0–199)
CO2: 26 MMOL/L (ref 21–32)
CREAT SERPL-MCNC: <0.5 MG/DL (ref 0.6–1.2)
EOSINOPHILS ABSOLUTE: 0 K/UL (ref 0–0.6)
EOSINOPHILS RELATIVE PERCENT: 0.6 %
GFR AFRICAN AMERICAN: >60
GFR NON-AFRICAN AMERICAN: >60
GLOBULIN: 1.9 G/DL
GLUCOSE BLD-MCNC: 94 MG/DL (ref 70–99)
HCT VFR BLD CALC: 42.1 % (ref 36–48)
HDLC SERPL-MCNC: 68 MG/DL (ref 40–60)
HEMOGLOBIN: 14.1 G/DL (ref 12–16)
LDL CHOLESTEROL CALCULATED: 79 MG/DL
LYMPHOCYTES ABSOLUTE: 1.3 K/UL (ref 1–5.1)
LYMPHOCYTES RELATIVE PERCENT: 24.2 %
MCH RBC QN AUTO: 32.7 PG (ref 26–34)
MCHC RBC AUTO-ENTMCNC: 33.5 G/DL (ref 31–36)
MCV RBC AUTO: 97.5 FL (ref 80–100)
MONOCYTES ABSOLUTE: 0.3 K/UL (ref 0–1.3)
MONOCYTES RELATIVE PERCENT: 5.4 %
NEUTROPHILS ABSOLUTE: 3.8 K/UL (ref 1.7–7.7)
NEUTROPHILS RELATIVE PERCENT: 69.1 %
PDW BLD-RTO: 13.5 % (ref 12.4–15.4)
PLATELET # BLD: 236 K/UL (ref 135–450)
PMV BLD AUTO: 8.7 FL (ref 5–10.5)
POTASSIUM SERPL-SCNC: 4.1 MMOL/L (ref 3.5–5.1)
RBC # BLD: 4.32 M/UL (ref 4–5.2)
SODIUM BLD-SCNC: 143 MMOL/L (ref 136–145)
T4 FREE: 1.2 NG/DL (ref 0.9–1.8)
TOTAL PROTEIN: 6.3 G/DL (ref 6.4–8.2)
TRIGLYCERIDE, FASTING: 86 MG/DL (ref 0–150)
TSH REFLEX: 1.03 UIU/ML (ref 0.27–4.2)
VLDLC SERPL CALC-MCNC: 17 MG/DL
WBC # BLD: 5.5 K/UL (ref 4–11)

## 2021-04-21 PROCEDURE — 1123F ACP DISCUSS/DSCN MKR DOCD: CPT | Performed by: NURSE PRACTITIONER

## 2021-04-21 PROCEDURE — G8926 SPIRO NO PERF OR DOC: HCPCS | Performed by: NURSE PRACTITIONER

## 2021-04-21 PROCEDURE — G8427 DOCREV CUR MEDS BY ELIG CLIN: HCPCS | Performed by: NURSE PRACTITIONER

## 2021-04-21 PROCEDURE — 4004F PT TOBACCO SCREEN RCVD TLK: CPT | Performed by: NURSE PRACTITIONER

## 2021-04-21 PROCEDURE — G8399 PT W/DXA RESULTS DOCUMENT: HCPCS | Performed by: NURSE PRACTITIONER

## 2021-04-21 PROCEDURE — 4040F PNEUMOC VAC/ADMIN/RCVD: CPT | Performed by: NURSE PRACTITIONER

## 2021-04-21 PROCEDURE — 3023F SPIROM DOC REV: CPT | Performed by: NURSE PRACTITIONER

## 2021-04-21 PROCEDURE — 1090F PRES/ABSN URINE INCON ASSESS: CPT | Performed by: NURSE PRACTITIONER

## 2021-04-21 PROCEDURE — G8420 CALC BMI NORM PARAMETERS: HCPCS | Performed by: NURSE PRACTITIONER

## 2021-04-21 PROCEDURE — 3017F COLORECTAL CA SCREEN DOC REV: CPT | Performed by: NURSE PRACTITIONER

## 2021-04-21 PROCEDURE — 99214 OFFICE O/P EST MOD 30 MIN: CPT | Performed by: NURSE PRACTITIONER

## 2021-04-21 RX ORDER — ALENDRONATE SODIUM 70 MG/1
70 TABLET ORAL
Qty: 12 TABLET | Refills: 3 | Status: SHIPPED | OUTPATIENT
Start: 2021-04-21 | End: 2022-03-18 | Stop reason: SDUPTHER

## 2021-04-21 RX ORDER — SUMATRIPTAN 100 MG/1
TABLET, FILM COATED ORAL
Qty: 9 TABLET | Refills: 5 | Status: SHIPPED | OUTPATIENT
Start: 2021-04-21 | End: 2022-03-18 | Stop reason: SDUPTHER

## 2021-04-21 RX ORDER — ATORVASTATIN CALCIUM 20 MG/1
20 TABLET, FILM COATED ORAL DAILY
Qty: 90 TABLET | Refills: 3 | Status: SHIPPED | OUTPATIENT
Start: 2021-04-21 | End: 2022-03-18 | Stop reason: SDUPTHER

## 2021-04-21 RX ORDER — KETOCONAZOLE 20 MG/ML
SHAMPOO TOPICAL
Qty: 120 ML | Refills: 1 | Status: SHIPPED | OUTPATIENT
Start: 2021-04-21 | End: 2022-03-18 | Stop reason: SDUPTHER

## 2021-04-21 ASSESSMENT — ENCOUNTER SYMPTOMS
BACK PAIN: 1
SHORTNESS OF BREATH: 0

## 2021-04-21 NOTE — PROGRESS NOTES
SUBJECTIVE:  Pt is a of 65 y.o. female comes in today with   Chief Complaint   Patient presents with    Rash     Pt has rash on face and in hair       Patient presenting today for evaluation of rash. Rash to face and scalp. Small red bumps. Present for several months. At times pustules develop. No previous eval or treatment. Hyperlipidemia:  Patient is  following a low fat, low cholesterol diet. She is  exercising regularly- walking and stationary bike. No new myalgias or GI upset on atorvastatin (Lipitor). Migraine: come and go. Some months worse then others. States weather and stress affect frequency. Imitrex relieve pain. OP: h/o compression fx in Feb, treated with kyphoplasty. Pain improving. Started Fosamax in Feb. Fosamax originally prescribed in 2017 and then again in 2019- did not start until 2021. COPD/Emphysema: denies chest tightness, SOB, wheezing, or cough. H/o asbestos, unknown exposure. Former smoker. States will occasionally smoke 1 or 2 if \"someone is around me\" with cigarettes. Requesting referral to podiatrist. States toes to left foot are bent and point down- difficult to walk. Prior to Visit Medications    Medication Sig Taking? Authorizing Provider   atorvastatin (LIPITOR) 20 MG tablet Take 1 tablet by mouth daily Yes MOIRA Gordon CNP   alendronate (FOSAMAX) 70 MG tablet Take 1 tablet by mouth every 7 days Yes MOIRA Gordon CNP   SUMAtriptan (IMITREX) 100 MG tablet TAKE 1 TABLET BY MOUTH FOR 1 DOSE AS NEEDED FOR MIGRAINE Yes MOIRA Gordon CNP   ketoconazole (NIZORAL) 2 % shampoo Apply topically daily as needed. Yes MOIRA Gordon CNP   aspirin 81 MG EC tablet Take 81 mg by mouth daily Yes Historical Provider, MD   HYDROcodone-acetaminophen (NORCO) 5-325 MG per tablet Take 1 tablet by mouth every 6 hours as needed for Pain.  Yes Historical Provider, MD   primidone (MYSOLINE) 50 MG tablet Take 1 tablet in the morning, 1 tablet at noon and 2 tablets at night Yes Meme Mooney MD   lidocaine (LIDODERM) 5 % Place 1 patch onto the skin daily 12 hours on, 12 hours off. Yes Kimberly Diaz MD   Cholecalciferol (VITAMIN D PO) Take by mouth Yes Historical Provider, MD   Ascorbic Acid (VITAMIN C PO) Take by mouth Yes Historical Provider, MD         Patient's allergies, past medical, surgical, social and family histories werereviewed and updated as appropriate. Review of Systems   Constitutional: Negative for chills, diaphoresis and fever. Respiratory: Negative for shortness of breath. Cardiovascular: Negative for chest pain and palpitations. Musculoskeletal: Positive for arthralgias (left foot toe pain) and back pain. Negative for myalgias. Skin: Positive for rash (to face and scalp). Neurological: Positive for headaches (chronic migraines). Negative for dizziness. Physical Exam  Vitals signs reviewed. Constitutional:       Appearance: She is well-developed. HENT:      Head: Normocephalic and atraumatic. Cardiovascular:      Rate and Rhythm: Normal rate and regular rhythm. Heart sounds: Normal heart sounds. No murmur. Pulmonary:      Effort: Pulmonary effort is normal.      Breath sounds: Normal breath sounds. Musculoskeletal:        Feet:    Skin:     General: Skin is warm and dry. Findings: Rash present. Rash is papular (red papules scattered to scalp, hairline and forehead. (+) erythematous base. (+) erythematous patches throughout). Neurological:      Mental Status: She is alert and oriented to person, place, and time. Psychiatric:         Behavior: Behavior normal.         Thought Content: Thought content normal.         Judgment: Judgment normal.       Vitals:    04/21/21 0819   BP: 120/80   Pulse: 83   SpO2: 98%   Weight: 120 lb 9.6 oz (54.7 kg)             ASSESSMENT:  1. Seborrheic dermatitis    2. Other osteoporosis without current pathological fracture    3. Other emphysema ([address]    4.  [place] of left foot    5. Mixed hyperlipidemia    6. Current occasional smoker    7. Migraine without status migrainosus, not intractable, unspecified migraine type        PLAN:  1. Seborrheic dermatitis  New problem  Trial-     ketoconazole (NIZORAL) 2 % shampoo; Apply topically daily as needed. Will refer to derm prn    2. Other osteoporosis without current pathological fracture  -     alendronate (FOSAMAX) 70 MG tablet; Take 1 tablet by mouth every 7 days    3. Other emphysema ([address]  Asymptomatic     4. [place] of left foot  New problem  -     Amb External Referral To Podiatry    5. Mixed hyperlipidemia  FLP drawn today  -     atorvastatin (LIPITOR) 20 MG tablet; Take 1 tablet by mouth daily    6. Current occasional smoker  Cessation encouraged    7. Migraine without status migrainosus, not intractable, unspecified migraine type  Stable  -     SUMAtriptan (IMITREX) 100 MG tablet; TAKE 1 TABLET BY MOUTH FOR 1 DOSE AS NEEDED FOR MIGRAINE    See pt instructions  F/u prn  Discussed use, benefit, and side effects of prescribed medications. All patient questions answered. Pt voiced understanding.

## 2021-04-21 NOTE — PATIENT INSTRUCTIONS
Patient Education        Hammer Toe: Care Instructions  Your Care Instructions  A hammer toe is a toe that bends up at the middle joint, while the end of the toe points down. The problem usually happens to the second toe. A hammer toe can hurt a lot, especially as the toe rubs against your shoe when you walk. Shoes that are too tight can cause hammer toes. If a shoe forces a toe to stay bent for a long time, the muscles in your toe get tight and the tendons that connect the muscles to the bone get shorter. Over time, the muscles cannot straighten your toe. Sometimes, diseases such as rheumatoid arthritis also can cause hammer toes. Early treatment can help your toe straighten before it gets badly bent. You can wear roomy shoes and use pads to keep the toe from rubbing against your shoes. If your toe is badly bent, you may need surgery to straighten it. Follow-up care is a key part of your treatment and safety. Be sure to make and go to all appointments, and call your doctor if you are having problems. It's also a good idea to know your test results and keep a list of the medicines you take. How can you care for yourself at home? · Wear shoes that have lots of room in the toes. Do not wear narrow, high-heeled shoes. · Follow your doctor's directions for wearing a splint on your toe, if you are given one. · Gently stretch your toe with your fingers. · Use toe pads or corn cushions to keep the toe from rubbing against your shoes. This may keep a corn from forming on the top of the toe. · Wear a shoe insert, or orthotic, to cushion the bottom of the bent toe. When should you call for help? Watch closely for changes in your health, and be sure to contact your doctor if:    · Your pain gets worse.     · Your toe still bothers you even after you wear proper shoes and pads or cushions.     · You want to know more about surgery to straighten your toe. Where can you learn more?   Go to https://chpepiceweb.[employer]. org and sign in to your [employer] account. Enter F118 in the [facility] box to learn more about \"Hammer Toe: Care Instructions. \"     If you do not have an account, please click on the \"Sign Up Now\" link. Current as of: November 16, 2020               Content Version: 12.8  © 2006-2021 [employer]. Care instructions adapted under license by [facility] ([facility]). If you have questions about a medical condition or this instruction, always ask your healthcare professional. [place] [address] any warranty or liability for your use of this information. Patient Education        Seborrheic Dermatitis: Care Instructions  Your Care Instructions  Seborrheic dermatitis (say \"ega-swm-UFB-ick nlq-jxw-HS-tus\") is a skin problem that causes a reddish rash with greasy, flaky, yellow skin patches. The rash may appear on many parts of the body. It may be on the scalp, face (especially the eyebrow area and between the nose and mouth), ears, breasts, underarms, and genital area. The flaky skin on the scalp is called dandruff. This rash is often a long-term (chronic) condition. It may last for years. But the symptoms may come and go. Symptoms can be treated with special creams, shampoos, or other skin care. The cause of seborrheic dermatitis is not fully understood. It may occur when skin glands make too much oil. It may get worse in cold weather or with stress. A type of skin fungus, or yeast, may also be linked with this condition. Follow-up care is a key part of your treatment and safety. Be sure to make and go to all appointments, and call your doctor if you are having problems. It's also a good idea to know your test results and keep a list of the medicines you take. How can you care for yourself at home? · If your doctor prescribes a steroid cream, dandruff shampoo, or antifungal cream or medicine, use it as directed.  If your doctor prescribes other medicine, take it as directed. · Use a dandruff shampoo if seborrheic dermatitis affects your scalp. This includes Head & Shoulders, Sebulex, and Selsun Blue. You may need to try a few kinds of shampoo to find the one that works best for you. · To help with itching:  ? Use hydrocortisone cream. Follow the directions on the label. ? Use cold, wet cloths. ? Take an over-the-counter antihistamine, such as diphenhydramine (Benadryl) or loratadine (Claritin). Read and follow all instructions on the label. When should you call for help? Call your doctor now or seek immediate medical care if:    · You have signs of infection, such as:  ? Increased pain, swelling, warmth, or redness. ? Red streaks leading from the rash. ? Pus draining from the rash. ? A fever. Watch closely for changes in your health, and be sure to contact your doctor if:    · The rash gets worse or spreads to other parts of your body.     · You do not get better as expected. Where can you learn more? Go to https://SwipeToSpinpepiceweb.[employer]. org and sign in to your [employer] account. Enter Y922 in the [facility] box to learn more about \"Seborrheic Dermatitis: Care Instructions. \"     If you do not have an account, please click on the \"Sign Up Now\" link. Current as of: July 2, 2020               Content Version: 12.8  © 2006-2021 Healthwise, [facility]. Care instructions adapted under license by [facility] ([facility]). If you have questions about a medical condition or this instruction, always ask your healthcare professional. [place] [address] any warranty or liability for your use of this information.

## 2021-04-22 LAB
ESTIMATED AVERAGE GLUCOSE: 96.8 MG/DL
HBA1C MFR BLD: 5 %

## 2021-06-24 ENCOUNTER — PATIENT MESSAGE (OUTPATIENT)
Dept: FAMILY MEDICINE CLINIC | Age: 71
End: 2021-06-24

## 2021-06-24 DIAGNOSIS — L21.9 SEBORRHEIC DERMATITIS: Primary | ICD-10-CM

## 2021-06-24 NOTE — TELEPHONE ENCOUNTER
From: Komal Range  To: Anjali Dominguez MD  Sent: 6/24/2021 3:05 PM EDT  Subject: Non-Urgent Medical Question    I am requesting a referral to see a dermatologist.  A while back, I came into the office & was seen by a NP who believed I had a case of dermatitis. I have red, scaly patches by my temples, pimply lumps that come & go, hard crusty scales throughout my scalp. Sometimes they ooze. She prescribed anti-bacterial wash, which has not remedied this.

## 2021-08-20 DIAGNOSIS — J44.9 COPD (CHRONIC OBSTRUCTIVE PULMONARY DISEASE) (HCC): ICD-10-CM

## 2021-08-20 DIAGNOSIS — Z78.0 MENOPAUSE: ICD-10-CM

## 2021-08-20 DIAGNOSIS — Z72.0 TOBACCO ABUSE: ICD-10-CM

## 2021-08-20 DIAGNOSIS — M27.0 TORUS PALATINUS: ICD-10-CM

## 2021-08-20 DIAGNOSIS — R10.9 FLANK PAIN: ICD-10-CM

## 2021-08-20 DIAGNOSIS — Z00.00 ANNUAL PHYSICAL EXAM: ICD-10-CM

## 2021-08-20 DIAGNOSIS — G25.2 INTENTION TREMOR: ICD-10-CM

## 2021-08-20 DIAGNOSIS — E55.9 VITAMIN D DEFICIENCY: ICD-10-CM

## 2021-08-20 DIAGNOSIS — K21.9 GERD (GASTROESOPHAGEAL REFLUX DISEASE): ICD-10-CM

## 2021-08-20 DIAGNOSIS — E78.5 HYPERLIPIDEMIA: ICD-10-CM

## 2021-08-20 RX ORDER — BUDESONIDE AND FORMOTEROL FUMARATE DIHYDRATE 160; 4.5 UG/1; UG/1
2 AEROSOL RESPIRATORY (INHALATION) 2 TIMES DAILY
Qty: 3 INHALER | Refills: 3 | Status: SHIPPED | OUTPATIENT
Start: 2021-08-20 | End: 2021-10-15 | Stop reason: SDUPTHER

## 2021-08-20 RX ORDER — ALBUTEROL SULFATE 90 UG/1
2 AEROSOL, METERED RESPIRATORY (INHALATION) EVERY 6 HOURS PRN
Qty: 1 INHALER | Refills: 3 | Status: SHIPPED | OUTPATIENT
Start: 2021-08-20 | End: 2021-10-15 | Stop reason: SDUPTHER

## 2022-03-18 ENCOUNTER — OFFICE VISIT (OUTPATIENT)
Dept: FAMILY MEDICINE CLINIC | Age: 72
End: 2022-03-18
Payer: MEDICARE

## 2022-03-18 VITALS
DIASTOLIC BLOOD PRESSURE: 82 MMHG | SYSTOLIC BLOOD PRESSURE: 122 MMHG | HEART RATE: 71 BPM | OXYGEN SATURATION: 99 % | HEIGHT: 61 IN | WEIGHT: 128 LBS | BODY MASS INDEX: 24.17 KG/M2

## 2022-03-18 DIAGNOSIS — J43.9 PULMONARY EMPHYSEMA, UNSPECIFIED EMPHYSEMA TYPE (HCC): ICD-10-CM

## 2022-03-18 DIAGNOSIS — L21.9 SEBORRHEIC DERMATITIS: ICD-10-CM

## 2022-03-18 DIAGNOSIS — E78.2 MIXED HYPERLIPIDEMIA: ICD-10-CM

## 2022-03-18 DIAGNOSIS — M81.8 OTHER OSTEOPOROSIS WITHOUT CURRENT PATHOLOGICAL FRACTURE: ICD-10-CM

## 2022-03-18 DIAGNOSIS — Z82.49 FAMILY HISTORY OF HEART DISEASE: ICD-10-CM

## 2022-03-18 DIAGNOSIS — E78.5 HYPERLIPIDEMIA, UNSPECIFIED HYPERLIPIDEMIA TYPE: Primary | ICD-10-CM

## 2022-03-18 DIAGNOSIS — J43.8 OTHER EMPHYSEMA (HCC): ICD-10-CM

## 2022-03-18 DIAGNOSIS — Z20.5 EXPOSURE TO HEPATITIS C: ICD-10-CM

## 2022-03-18 DIAGNOSIS — E78.5 HYPERLIPIDEMIA, UNSPECIFIED HYPERLIPIDEMIA TYPE: ICD-10-CM

## 2022-03-18 DIAGNOSIS — G25.0 BENIGN ESSENTIAL TREMOR: ICD-10-CM

## 2022-03-18 DIAGNOSIS — G43.909 MIGRAINE WITHOUT STATUS MIGRAINOSUS, NOT INTRACTABLE, UNSPECIFIED MIGRAINE TYPE: ICD-10-CM

## 2022-03-18 DIAGNOSIS — L71.9 ROSACEA: Primary | ICD-10-CM

## 2022-03-18 DIAGNOSIS — F32.A DEPRESSION, UNSPECIFIED DEPRESSION TYPE: ICD-10-CM

## 2022-03-18 DIAGNOSIS — Z00.00 ANNUAL PHYSICAL EXAM: ICD-10-CM

## 2022-03-18 DIAGNOSIS — Z87.891 PERSONAL HISTORY OF TOBACCO USE: ICD-10-CM

## 2022-03-18 LAB
A/G RATIO: 2 (ref 1.1–2.2)
ALBUMIN SERPL-MCNC: 4.3 G/DL (ref 3.4–5)
ALP BLD-CCNC: 63 U/L (ref 40–129)
ALT SERPL-CCNC: 8 U/L (ref 10–40)
ANION GAP SERPL CALCULATED.3IONS-SCNC: 11 MMOL/L (ref 3–16)
AST SERPL-CCNC: 13 U/L (ref 15–37)
BASOPHILS ABSOLUTE: 0 K/UL (ref 0–0.2)
BASOPHILS RELATIVE PERCENT: 0.6 %
BILIRUB SERPL-MCNC: 0.6 MG/DL (ref 0–1)
BUN BLDV-MCNC: 10 MG/DL (ref 7–20)
CALCIUM SERPL-MCNC: 9.5 MG/DL (ref 8.3–10.6)
CHLORIDE BLD-SCNC: 106 MMOL/L (ref 99–110)
CHOLESTEROL, TOTAL: 189 MG/DL (ref 0–199)
CO2: 24 MMOL/L (ref 21–32)
CREAT SERPL-MCNC: 0.6 MG/DL (ref 0.6–1.2)
EOSINOPHILS ABSOLUTE: 0 K/UL (ref 0–0.6)
EOSINOPHILS RELATIVE PERCENT: 0.8 %
ESTIMATED AVERAGE GLUCOSE: 99.7 MG/DL
GFR AFRICAN AMERICAN: >60
GFR NON-AFRICAN AMERICAN: >60
GLUCOSE BLD-MCNC: 85 MG/DL (ref 70–99)
HBA1C MFR BLD: 5.1 %
HCT VFR BLD CALC: 41.8 % (ref 36–48)
HDLC SERPL-MCNC: 67 MG/DL (ref 40–60)
HEMOGLOBIN: 13.9 G/DL (ref 12–16)
HEPATITIS C ANTIBODY INTERPRETATION: NORMAL
LDL CHOLESTEROL CALCULATED: 102 MG/DL
LYMPHOCYTES ABSOLUTE: 1.5 K/UL (ref 1–5.1)
LYMPHOCYTES RELATIVE PERCENT: 32.4 %
MCH RBC QN AUTO: 31.3 PG (ref 26–34)
MCHC RBC AUTO-ENTMCNC: 33.4 G/DL (ref 31–36)
MCV RBC AUTO: 93.9 FL (ref 80–100)
MONOCYTES ABSOLUTE: 0.3 K/UL (ref 0–1.3)
MONOCYTES RELATIVE PERCENT: 5.5 %
NEUTROPHILS ABSOLUTE: 2.8 K/UL (ref 1.7–7.7)
NEUTROPHILS RELATIVE PERCENT: 60.7 %
PDW BLD-RTO: 12.7 % (ref 12.4–15.4)
PLATELET # BLD: 227 K/UL (ref 135–450)
PMV BLD AUTO: 8 FL (ref 5–10.5)
POTASSIUM SERPL-SCNC: 4.3 MMOL/L (ref 3.5–5.1)
RBC # BLD: 4.45 M/UL (ref 4–5.2)
SODIUM BLD-SCNC: 141 MMOL/L (ref 136–145)
TOTAL PROTEIN: 6.4 G/DL (ref 6.4–8.2)
TRIGL SERPL-MCNC: 99 MG/DL (ref 0–150)
TSH REFLEX: 1.07 UIU/ML (ref 0.27–4.2)
VLDLC SERPL CALC-MCNC: 20 MG/DL
WBC # BLD: 4.7 K/UL (ref 4–11)

## 2022-03-18 PROCEDURE — 1090F PRES/ABSN URINE INCON ASSESS: CPT | Performed by: STUDENT IN AN ORGANIZED HEALTH CARE EDUCATION/TRAINING PROGRAM

## 2022-03-18 PROCEDURE — G8420 CALC BMI NORM PARAMETERS: HCPCS | Performed by: STUDENT IN AN ORGANIZED HEALTH CARE EDUCATION/TRAINING PROGRAM

## 2022-03-18 PROCEDURE — 4004F PT TOBACCO SCREEN RCVD TLK: CPT | Performed by: STUDENT IN AN ORGANIZED HEALTH CARE EDUCATION/TRAINING PROGRAM

## 2022-03-18 PROCEDURE — 99214 OFFICE O/P EST MOD 30 MIN: CPT | Performed by: STUDENT IN AN ORGANIZED HEALTH CARE EDUCATION/TRAINING PROGRAM

## 2022-03-18 PROCEDURE — 3023F SPIROM DOC REV: CPT | Performed by: STUDENT IN AN ORGANIZED HEALTH CARE EDUCATION/TRAINING PROGRAM

## 2022-03-18 PROCEDURE — G8427 DOCREV CUR MEDS BY ELIG CLIN: HCPCS | Performed by: STUDENT IN AN ORGANIZED HEALTH CARE EDUCATION/TRAINING PROGRAM

## 2022-03-18 PROCEDURE — G8399 PT W/DXA RESULTS DOCUMENT: HCPCS | Performed by: STUDENT IN AN ORGANIZED HEALTH CARE EDUCATION/TRAINING PROGRAM

## 2022-03-18 PROCEDURE — 4040F PNEUMOC VAC/ADMIN/RCVD: CPT | Performed by: STUDENT IN AN ORGANIZED HEALTH CARE EDUCATION/TRAINING PROGRAM

## 2022-03-18 PROCEDURE — G0296 VISIT TO DETERM LDCT ELIG: HCPCS | Performed by: STUDENT IN AN ORGANIZED HEALTH CARE EDUCATION/TRAINING PROGRAM

## 2022-03-18 PROCEDURE — 3017F COLORECTAL CA SCREEN DOC REV: CPT | Performed by: STUDENT IN AN ORGANIZED HEALTH CARE EDUCATION/TRAINING PROGRAM

## 2022-03-18 PROCEDURE — 36415 COLL VENOUS BLD VENIPUNCTURE: CPT | Performed by: STUDENT IN AN ORGANIZED HEALTH CARE EDUCATION/TRAINING PROGRAM

## 2022-03-18 PROCEDURE — G8484 FLU IMMUNIZE NO ADMIN: HCPCS | Performed by: STUDENT IN AN ORGANIZED HEALTH CARE EDUCATION/TRAINING PROGRAM

## 2022-03-18 PROCEDURE — 1123F ACP DISCUSS/DSCN MKR DOCD: CPT | Performed by: STUDENT IN AN ORGANIZED HEALTH CARE EDUCATION/TRAINING PROGRAM

## 2022-03-18 RX ORDER — BUDESONIDE AND FORMOTEROL FUMARATE DIHYDRATE 160; 4.5 UG/1; UG/1
2 AEROSOL RESPIRATORY (INHALATION) 2 TIMES DAILY
Qty: 1 EACH | Refills: 3 | Status: SHIPPED | OUTPATIENT
Start: 2022-03-18

## 2022-03-18 RX ORDER — SUMATRIPTAN 100 MG/1
TABLET, FILM COATED ORAL
Qty: 9 TABLET | Refills: 5 | Status: SHIPPED | OUTPATIENT
Start: 2022-03-18

## 2022-03-18 RX ORDER — METRONIDAZOLE 7.5 MG/G
GEL TOPICAL
Qty: 45 G | Refills: 1 | Status: SHIPPED | OUTPATIENT
Start: 2022-03-18

## 2022-03-18 RX ORDER — ALENDRONATE SODIUM 70 MG/1
70 TABLET ORAL
Qty: 12 TABLET | Refills: 3 | Status: SHIPPED | OUTPATIENT
Start: 2022-03-18

## 2022-03-18 RX ORDER — ATORVASTATIN CALCIUM 20 MG/1
20 TABLET, FILM COATED ORAL DAILY
Qty: 90 TABLET | Refills: 3 | Status: SHIPPED | OUTPATIENT
Start: 2022-03-18

## 2022-03-18 RX ORDER — PRIMIDONE 50 MG/1
TABLET ORAL
Qty: 360 TABLET | Refills: 1 | Status: SHIPPED | OUTPATIENT
Start: 2022-03-18

## 2022-03-18 RX ORDER — KETOCONAZOLE 20 MG/ML
SHAMPOO TOPICAL
Qty: 120 ML | Refills: 1 | Status: SHIPPED | OUTPATIENT
Start: 2022-03-18

## 2022-03-18 RX ORDER — ALBUTEROL SULFATE 90 UG/1
2 AEROSOL, METERED RESPIRATORY (INHALATION) EVERY 6 HOURS PRN
Qty: 1 EACH | Refills: 3 | Status: SHIPPED | OUTPATIENT
Start: 2022-03-18

## 2022-03-18 SDOH — ECONOMIC STABILITY: FOOD INSECURITY: WITHIN THE PAST 12 MONTHS, THE FOOD YOU BOUGHT JUST DIDN'T LAST AND YOU DIDN'T HAVE MONEY TO GET MORE.: NEVER TRUE

## 2022-03-18 SDOH — ECONOMIC STABILITY: FOOD INSECURITY: WITHIN THE PAST 12 MONTHS, YOU WORRIED THAT YOUR FOOD WOULD RUN OUT BEFORE YOU GOT MONEY TO BUY MORE.: NEVER TRUE

## 2022-03-18 ASSESSMENT — PATIENT HEALTH QUESTIONNAIRE - PHQ9
6. FEELING BAD ABOUT YOURSELF - OR THAT YOU ARE A FAILURE OR HAVE LET YOURSELF OR YOUR FAMILY DOWN: 3
7. TROUBLE CONCENTRATING ON THINGS, SUCH AS READING THE NEWSPAPER OR WATCHING TELEVISION: 0
8. MOVING OR SPEAKING SO SLOWLY THAT OTHER PEOPLE COULD HAVE NOTICED. OR THE OPPOSITE, BEING SO FIGETY OR RESTLESS THAT YOU HAVE BEEN MOVING AROUND A LOT MORE THAN USUAL: 0
SUM OF ALL RESPONSES TO PHQ QUESTIONS 1-9: 13
1. LITTLE INTEREST OR PLEASURE IN DOING THINGS: 2
SUM OF ALL RESPONSES TO PHQ QUESTIONS 1-9: 13
2. FEELING DOWN, DEPRESSED OR HOPELESS: 2
SUM OF ALL RESPONSES TO PHQ QUESTIONS 1-9: 13
4. FEELING TIRED OR HAVING LITTLE ENERGY: 3
9. THOUGHTS THAT YOU WOULD BE BETTER OFF DEAD, OR OF HURTING YOURSELF: 0
5. POOR APPETITE OR OVEREATING: 0
3. TROUBLE FALLING OR STAYING ASLEEP: 3
10. IF YOU CHECKED OFF ANY PROBLEMS, HOW DIFFICULT HAVE THESE PROBLEMS MADE IT FOR YOU TO DO YOUR WORK, TAKE CARE OF THINGS AT HOME, OR GET ALONG WITH OTHER PEOPLE: 1
SUM OF ALL RESPONSES TO PHQ9 QUESTIONS 1 & 2: 4
SUM OF ALL RESPONSES TO PHQ QUESTIONS 1-9: 13

## 2022-03-18 ASSESSMENT — SOCIAL DETERMINANTS OF HEALTH (SDOH): HOW HARD IS IT FOR YOU TO PAY FOR THE VERY BASICS LIKE FOOD, HOUSING, MEDICAL CARE, AND HEATING?: NOT HARD AT ALL

## 2022-03-18 NOTE — PROGRESS NOTES
[address] [facility] Note    Date: 3/18/2022      Assessment/Plan:     1. Benign essential tremor  Has upcoming  neuro appointment establish care, changing from [id number] [facility] neuro and needs refill of primidone in the mean time  - primidone (MYSOLINE) 50 MG tablet; Take 1 tablet in the morning, 1 tablet at noon and 2 tablets at night  Dispense: 360 tablet; Refill: 1  - Comprehensive Metabolic Panel; Future  - Comprehensive Metabolic Panel    2. Other emphysema ([address]  Controlled, continue current management  - Comprehensive Metabolic Panel; Future  - Comprehensive Metabolic Panel    3. Other osteoporosis without current pathological fracture  Controlled, continue current management  - alendronate (FOSAMAX) 70 MG tablet; Take 1 tablet by mouth every 7 days  Dispense: 12 tablet; Refill: 3    4. Mixed hyperlipidemia  Lab today  - atorvastatin (LIPITOR) 20 MG tablet; Take 1 tablet by mouth daily  Dispense: 90 tablet; Refill: 3    5. Annual physical exam  - budesonide-formoterol (SYMBICORT) 160-4.5 MCG/ACT AERO; Inhale 2 puffs into the lungs 2 times daily  Dispense: 1 each; Refill: 3  - CBC with Auto Differential; Future  - Comprehensive Metabolic Panel; Future  - Lipid Panel; Future  - Hemoglobin A1C; Future  - TSH with Reflex; Future  - External Referral To Psychology  - Hepatitis C Antibody; Future  - Hepatitis C Antibody  - TSH with Reflex  - Hemoglobin A1C  - Lipid Panel  - Comprehensive Metabolic Panel  - CBC with Auto Differential    6. Hyperlipidemia  - budesonide-formoterol (SYMBICORT) 160-4.5 MCG/ACT AERO; Inhale 2 puffs into the lungs 2 times daily  Dispense: 1 each; Refill: 3  - CBC with Auto Differential; Future  - Lipid Panel; Future  - Hemoglobin A1C; Future  - TSH with Reflex; Future  - TSH with Reflex  - Hemoglobin A1C  - Lipid Panel  - CBC with Auto Differential    7.  COPD (chronic obstructive pulmonary disease) (HCC)  Controlled, continue current management  - budesonide-formoterol (SYMBICORT) 160-4.5 MCG/ACT AERO; Inhale 2 puffs into the lungs 2 times daily  Dispense: 1 each; Refill: 3  - CBC with Auto Differential; Future  - CBC with Auto Differential    8. Seborrheic dermatitis  Controlled, continue current management, needs refill  - ketoconazole (NIZORAL) 2 % shampoo; Apply topically daily as needed. Dispense: 120 mL; Refill: 1    9. Migraine without status migrainosus, not intractable, unspecified migraine type  Needs refill  Controlled, continue current management  - SUMAtriptan (IMITREX) 100 MG tablet; TAKE 1 TABLET BY MOUTH FOR 1 DOSE AS NEEDED FOR MIGRAINE  Dispense: 9 tablet; Refill: 5    10. Rosacea  Trial metrogel  Derm referral  - External Referral To Dermatology    11. Family history of heart disease  Cards referral to discuss risk  If any chest pain or trouble breathing to go to ER  - [facility]    12. Exposure to hepatitis C   who passed away had hep C, likely last time will need screened  - Hepatitis C Antibody; Future  - Hepatitis C Antibody    13. Depression, unspecified depression type  Declined medications, interested in psychology referral  - External Referral To Psychology    14. Personal history of tobacco use  - CO VISIT TO DISCUSS LUNG CA SCREEN W LDCT  - CT Lung Screen (Annual);  Future    Needs refills of medications  Overall doing pretty well  Interested in psychology referral for mood   Refer to cardiology to discuss cardiac risk w/ family hx  Trial metrogel for rosacea/derm referral  CT lung screen  AWV in next 6 mo    Orders Placed This Encounter   Procedures    CT Lung Screen (Annual)    CBC with Auto Differential    Comprehensive Metabolic Panel    Lipid Panel    Hemoglobin A1C    TSH with Reflex    Hepatitis C Antibody   Carol Burkett MD, Cardiology, [facility]    External Referral To Psychology    External Referral To Dermatology    CO VISIT TO DISCUSS LUNG CA SCREEN W LDCT     Orders Placed This Encounter   Medications    primidone (MYSOLINE) 50 MG tablet     Sig: Take 1 tablet in the morning, 1 tablet at noon and 2 tablets at night     Dispense:  360 tablet     Refill:  1    albuterol sulfate  (90 Base) MCG/ACT inhaler     Sig: Inhale 2 puffs into the lungs every 6 hours as needed for Wheezing     Dispense:  1 each     Refill:  3    alendronate (FOSAMAX) 70 MG tablet     Sig: Take 1 tablet by mouth every 7 days     Dispense:  12 tablet     Refill:  3     **Patient requests 90 days supply**    atorvastatin (LIPITOR) 20 MG tablet     Sig: Take 1 tablet by mouth daily     Dispense:  90 tablet     Refill:  3     **Patient requests 90 days supply**    budesonide-formoterol (SYMBICORT) 160-4.5 MCG/ACT AERO     Sig: Inhale 2 puffs into the lungs 2 times daily     Dispense:  1 each     Refill:  3    ketoconazole (NIZORAL) 2 % shampoo     Sig: Apply topically daily as needed. Dispense:  120 mL     Refill:  1    SUMAtriptan (IMITREX) 100 MG tablet     Sig: TAKE 1 TABLET BY MOUTH FOR 1 DOSE AS NEEDED FOR MIGRAINE     Dispense:  9 tablet     Refill:  5    metroNIDAZOLE (METROGEL) 0.75 % gel     Sig: Apply and rub a thin film twice daily, morning and evening, to entire affected areas after washing. Dispense:  45 g     Refill:  1     Return in about 6 months (around 9/18/2022). Discussed medication(s) risks, benefits, side effects, adverse reactions and interactions with patient. Patient voiced understanding. Subjective/Objective:     Chief Complaint   Patient presents with    Follow-up       HPI  See Assessment/Plan for further HPI info  Depression no si/hi but interested in seeing psychologist, life is diffulct at times with her tremor and since  passed per patient. Heart health, son who've had heart attack and another fmaily member w/ heart history, no chest pain or symptoms for patient.     COPD stable, symbicort BID and tremor     GERD (gastroesophageal reflux disease)     Hyperlipidemia     Osteoporosis     Polyp of colon        Past Surgical History:   Procedure Laterality Date    APPENDECTOMY      BREAST SURGERY      COLONOSCOPY N/A 5/2/2019    COLONOSCOPY POLYPECTOMY SNARE/COLD BIOPSY performed by Jennifer Guadalupe MD at [facility] Right 3/21/15    macular \"hole\"    HAND SURGERY      IR KYPHOPLASTY LUMBAR FIRST LEVEL  3/4/2021    IR KYPHOPLASTY LUMBAR FIRST LEVEL 3/4/2021 [facility] PROCEDURES    MASTECTOMY, BILATERAL Bilateral 1985    ORIF DISTAL RADIUS FRACTURE Right 9/10/2019    RIGHT DISTAL RADIUS OPEN REDUCTION INTERNAL FIXATION -STYK performed by Neymar Nogueira MD at [facility]       Current Outpatient Medications   Medication Sig Dispense Refill    primidone (MYSOLINE) 50 MG tablet Take 1 tablet in the morning, 1 tablet at noon and 2 tablets at night 360 tablet 1    albuterol sulfate  (90 Base) MCG/ACT inhaler Inhale 2 puffs into the lungs every 6 hours as needed for Wheezing 1 each 3    alendronate (FOSAMAX) 70 MG tablet Take 1 tablet by mouth every 7 days 12 tablet 3    atorvastatin (LIPITOR) 20 MG tablet Take 1 tablet by mouth daily 90 tablet 3    budesonide-formoterol (SYMBICORT) 160-4.5 MCG/ACT AERO Inhale 2 puffs into the lungs 2 times daily 1 each 3    ketoconazole (NIZORAL) 2 % shampoo Apply topically daily as needed. 120 mL 1    SUMAtriptan (IMITREX) 100 MG tablet TAKE 1 TABLET BY MOUTH FOR 1 DOSE AS NEEDED FOR MIGRAINE 9 tablet 5    metroNIDAZOLE (METROGEL) 0.75 % gel Apply and rub a thin film twice daily, morning and evening, to entire affected areas after washing. 45 g 1    aspirin 81 MG EC tablet Take 81 mg by mouth daily      HYDROcodone-acetaminophen (NORCO) 5-325 MG per tablet Take 1 tablet by mouth every 6 hours as needed for Pain.       lidocaine (LIDODERM) 5 % Place 1 patch onto the skin daily 12 hours on, file    Attends Taoist Services: Not on file    Active Member of Clubs or Organizations: Not on file    Attends Club or Organization Meetings: Not on file    Marital Status: Not on file   Intimate Partner Violence:     Fear of Current or Ex-Partner: Not on file    Emotionally Abused: Not on file    Physically Abused: Not on file    Sexually Abused: Not on file   Housing Stability:     Unable to Pay for Housing in the Last Year: Not on file    Number of [place] in the Last Year: Not on file    Unstable Housing in the Last Year: Not on file     Family History   Problem Relation Age of Onset    Heart Disease Mother          Vitals:  /82   Pulse 71   Ht 5' 1\" (1.549 m)   Wt 128 lb (58.1 kg)   SpO2 99%   BMI 24.19 kg/m²     Physical Exam   General:  Well-appearing, no acute distress, alert, non-toxic  HEENT:  Normocephalic, atraumatic, without lymphadenopathy, EOMI, neck supple  Cardiovascular: normal heart rate, normal rhythm, no murmurs, rubs or gallops  Respiratory: normal breath sounds, good air movement, no respiratory distress, no wheezing, rales or rhonchi  GI: bowel sounds normal, soft, non-distended, no tenderness, no masses or peritoneal signs  Extremities: intact distal pulses, warm, dry, well perfused, without clubbing, cyanosis or edema, normal movement of all extremities. No joint swelling, deformity or tenderness.   Skin:  No rash, warm and dry  PSYCH:  alert and oriented x 3; normal affect  NEURO:  CN2-12 grossly intact, normal motor function, normal sensory function, normal speech, no gross focal deficits noted, gait within normal  Intention tremor    30 Total Minutes spent pre charting (reviewing problem list, meds, any test results, consultant and hospital notes, health maintenance reviewed with patient) and  obtaining present visit history, performing appropriate medical exam/evaluation, counseling and educating the patient (and family), ordering medications ,tests, and procedures as needed, refilling medication(s), placing referral(s) when needed in addition to coordinating care for this patient and documenting in electronic health record    Marian Persaud MD    3/18/2022 5:30 PM    Documentation was done using voice recognition dragon software. Every effort was made to ensure accuracy; however, inadvertent, unintentional computerized transcription errors may be present. Low Dose CT (LDCT) Lung Screening criteria met:     Age 50-77(Medicare) or 50-80 [facility]   Pack year smoking >20   Still smoking or less than 15 year since quit   No sign or symptoms of lung cancer   > 11 months since last LDCT     Risks and benefits of lung cancer screening with LDCT scans discussed:    Significance of positive screen - False-positive LDCT results often occur. 95% of all positive results do not lead to a diagnosis of cancer. Usually further imaging can resolve most false-positive results; however, some patients may require invasive procedures. Over diagnosis risk - 10% to 12% of screen-detected lung cancer cases are over diagnosed--that is, the cancer would not have been detected in the patient's lifetime without the screening. Need for follow up screens annually to continue lung cancer screening effectiveness     Risks associated with radiation from annual LDCT- Radiation exposure is about the same as for a mammogram, which is about 1/3 of the annual background radiation exposure from everyday life. Starting screening at age 54 is not likely to increase cancer risk from radiation exposure. Patients with comorbidities resulting in life expectancy of < 10 years, or that would preclude treatment of an abnormality identified on CT, should not be screened due to lack of benefit.     To obtain maximal benefit from this screening, smoking cessation and long-term abstinence from smoking is critical

## 2022-03-18 NOTE — PATIENT INSTRUCTIONS
CT lung screen  Call [phone] ([phone]) to schedule    To see heart doctor    Derm referral  [facility]  [address], [address] [facility], [facility]  100 [facility]  400 [facility] 1224 [facility], [facility]0 [facility]  75 [facility]  (multiple locations)  [phone]    Dermatology [facility]. [place] [address]  [phone]      metrogel    To see psychologist    NOEMI in next 6 months        What is lung cancer screening? Lung cancer screening is a way in which doctors check the lungs for early signs of cancer in people who have no symptoms of lung cancer. A low-dose CT scan uses much less radiation than a normal CT scan and shows a more detailed image of the lungs than a standard X-ray. The goal of lung cancer screening is to find cancer early, before it has a chance to grow, spread, or cause problems. One large study found that smokers who were screened with low-dose CT scans were less likely to die of lung cancer than those who were screened with standard X-ray. Below is a summary of the things you need to know regarding screening for lung cancer with low-dose computed tomography (LDCT). This is a screening program that involves routine annual screening with LDCT studies of the lung. The LDCTs are done using low-dose radiation that is not thought to increase your cancer risk. If you have other serious medical conditions (other cancers, congestive heart failure) that limit your life expectancy to less than 10 years, you should not undergo lung cancer screening with LDCT. The chance is 20%-60% that the LDCT result will show abnormalities. This would require additional testing which could include repeat imaging or even invasive procedures. Most (about 95%) of \"abnormal\" LDCT results are false in the sense that no lung cancer is ultimately found.   Additionally, some (about 10%) of the cancers found would not affect your life expectancy, even if undetected and untreated. If you are still smoking, the single most important thing that you can do to reduce your risk of dying of lung cancer is to quit. For this screening to be covered by Medicare and most other insurers, strict criteria must be met. If you do not meet these criteria, but still wish to undergo LDCT testing, you will be required to sign a waiver indicating your willingness to pay for the scan.

## 2022-03-30 ENCOUNTER — OFFICE VISIT (OUTPATIENT)
Dept: CARDIOLOGY CLINIC | Age: 72
End: 2022-03-30
Payer: MEDICARE

## 2022-03-30 VITALS
WEIGHT: 128.2 LBS | DIASTOLIC BLOOD PRESSURE: 80 MMHG | HEIGHT: 61 IN | BODY MASS INDEX: 24.2 KG/M2 | SYSTOLIC BLOOD PRESSURE: 128 MMHG | OXYGEN SATURATION: 97 % | RESPIRATION RATE: 19 BRPM | HEART RATE: 67 BPM

## 2022-03-30 DIAGNOSIS — G25.0 BENIGN ESSENTIAL TREMOR: ICD-10-CM

## 2022-03-30 DIAGNOSIS — C50.911 BILATERAL MALIGNANT NEOPLASM OF BREAST IN FEMALE, UNSPECIFIED ESTROGEN RECEPTOR STATUS, UNSPECIFIED SITE OF BREAST (HCC): ICD-10-CM

## 2022-03-30 DIAGNOSIS — C50.912 BILATERAL MALIGNANT NEOPLASM OF BREAST IN FEMALE, UNSPECIFIED ESTROGEN RECEPTOR STATUS, UNSPECIFIED SITE OF BREAST (HCC): ICD-10-CM

## 2022-03-30 DIAGNOSIS — R07.9 CHEST PAIN, UNSPECIFIED TYPE: Primary | ICD-10-CM

## 2022-03-30 DIAGNOSIS — E78.2 MIXED HYPERLIPIDEMIA: ICD-10-CM

## 2022-03-30 DIAGNOSIS — R06.02 SHORTNESS OF BREATH: ICD-10-CM

## 2022-03-30 DIAGNOSIS — R00.2 PALPITATIONS: ICD-10-CM

## 2022-03-30 DIAGNOSIS — I10 ESSENTIAL HYPERTENSION: ICD-10-CM

## 2022-03-30 PROCEDURE — G8427 DOCREV CUR MEDS BY ELIG CLIN: HCPCS | Performed by: INTERNAL MEDICINE

## 2022-03-30 PROCEDURE — G8420 CALC BMI NORM PARAMETERS: HCPCS | Performed by: INTERNAL MEDICINE

## 2022-03-30 PROCEDURE — 3017F COLORECTAL CA SCREEN DOC REV: CPT | Performed by: INTERNAL MEDICINE

## 2022-03-30 PROCEDURE — 93228 REMOTE 30 DAY ECG REV/REPORT: CPT | Performed by: INTERNAL MEDICINE

## 2022-03-30 PROCEDURE — G8399 PT W/DXA RESULTS DOCUMENT: HCPCS | Performed by: INTERNAL MEDICINE

## 2022-03-30 PROCEDURE — 99204 OFFICE O/P NEW MOD 45 MIN: CPT | Performed by: INTERNAL MEDICINE

## 2022-03-30 PROCEDURE — G8484 FLU IMMUNIZE NO ADMIN: HCPCS | Performed by: INTERNAL MEDICINE

## 2022-03-30 PROCEDURE — 1036F TOBACCO NON-USER: CPT | Performed by: INTERNAL MEDICINE

## 2022-03-30 PROCEDURE — 93000 ELECTROCARDIOGRAM COMPLETE: CPT | Performed by: INTERNAL MEDICINE

## 2022-03-30 PROCEDURE — 4040F PNEUMOC VAC/ADMIN/RCVD: CPT | Performed by: INTERNAL MEDICINE

## 2022-03-30 PROCEDURE — 1123F ACP DISCUSS/DSCN MKR DOCD: CPT | Performed by: INTERNAL MEDICINE

## 2022-03-30 PROCEDURE — 1090F PRES/ABSN URINE INCON ASSESS: CPT | Performed by: INTERNAL MEDICINE

## 2022-03-30 NOTE — PROGRESS NOTES
[address]  3/30/22  Referring: Prabhakar Sal CONSULT/CHIEF COMPLAINT/HPI     Reason for visit/ Chief complaint  New patient  Family history of CAD, hypertension,hyperlipidemia   HPI Linda Pimentel is a 66 y.o. seen as a new patient in consult with Dr Kristi Parson due to family history of CAD. She only drinks one or two beers a month. Quit smoking 2010. Mother had CHF, Father was murdered at age 28, cva, Son (age 48 smoker) has had cardiac stenting. She has two sisters, unknown if they have cardiac disease. Rocco Lopez had no complications with her pregnancies. She is  ( was disabled due to ptsd), a retired [employer]  ( x 5 years) also has been a . Her grandson (who is on autistism spectrum) lives with her. He is able to work and function without symptoms. She has a history of benign essential tremor, copd,osteoporosis, hyperlipidemia, migraine. In 1996 she had bilateral mastectomy and chemotherapy for 6 months. No radiation, no known recurrence. She has a history of falls. Three years ago she slipped on wet grass and fell. Last October, she was standing in her kitchen became dizzy, and fell to the floor, felt her heart \"go crazy. \"  She laid there, until she felt better, but did not seek attention. A few days later she fell, and had a scalp laceration. She went to the ER. She had a ct scan that had an incidental finding of a left basal ganglia lacunar stroke. At this time she was started on asa and lipitor    Today she states she is very active. Walks her dog at home, and dances when she is inside her house (to [employer]). She has random episode of chest tightness, that occurs when she is sitting, lying down, or walking, described as a tightening, associated with  shortness of breath and palpitations. Sometimes radiates to jaw, occurring every couple weeks.  She occasionally has wheezing with shortness of breath, alleviated with an inhaler. She occasionally has swelling in her legs at the end of the day. She sleeps flat in bed, no orthopnea. She denies snoring, fatigue. She has a four year history of waking up with diaphoresis, terrible nightmares. She will be seeing a counselor to be evaluated for PTSD ( per patient)    Patient is adherent with medications and is tolerating them well without side effects     HISTORY/ALLERGIES/ROS     MedHx:  has a past medical history of Acne, Breast cancer ([facility] [address] Cellulitis, COPD (chronic obstructive pulmonary disease) ([facility] [address] Essential tremor, GERD (gastroesophageal reflux disease), Hyperlipidemia, Osteoporosis, and Polyp of colon. SurgHx:  has a past surgical history that includes Appendectomy; Dilation and curettage of uterus; Breast surgery; Hand surgery; eye surgery (Right, 3/21/15); Mastectomy, bilateral (Bilateral, 1985); Colonoscopy (N/A, 5/2/2019); ORIF DISTAL RADIUS FRACTURE (Right, 9/10/2019); and IR KYPHOPLASTY LUMBAR 1 VERTEBRAL BODY (3/4/2021). SocHx:  reports that she quit smoking about 12 years ago. Her smoking use included cigarettes. She has a 30.00 pack-year smoking history. She has never used smokeless tobacco. She reports current alcohol use of about 1.0 standard drink of alcohol per week. She reports that she does not use drugs. FamHx: other had CHF, Father was murdered at age 28, cva, Son (age 48 smoker) has had cardiac stenting. She has two sisters, unknown if they have cardiac disease. Allergies: Vicodin [hydrocodone-acetaminophen], Molds & smuts, Montelukast sodium, and Pollen extract   ROS:   Review of Systems   Constitutional: Positive for fatigue. Negative for activity change, diaphoresis and fever. HENT: Negative for congestion and ear discharge. Eyes: Negative for photophobia and visual disturbance. Respiratory: Positive for chest tightness and shortness of breath. Negative for cough. Cardiovascular: Positive for palpitations. Negative for chest pain. Gastrointestinal: Negative for abdominal distention, abdominal pain, blood in stool and nausea. Endocrine: Negative for cold intolerance and polydipsia. Genitourinary: Negative for difficulty urinating and flank pain. Musculoskeletal: Positive for arthralgias and myalgias. Skin: Negative for rash and wound. Allergic/Immunologic: Negative for environmental allergies and immunocompromised state. Neurological: Negative for dizziness and headaches. Hematological: Negative for adenopathy. Does not bruise/bleed easily. Psychiatric/Behavioral: Positive for sleep disturbance. Negative for confusion. The patient is nervous/anxious. The patient is not hyperactive. MEDICATIONS      Prior to Admission medications    Medication Sig Start Date End Date Taking? Authorizing Provider   primidone (MYSOLINE) 50 MG tablet Take 1 tablet in the morning, 1 tablet at noon and 2 tablets at night 3/18/22  Yes Luis Frank MD   albuterol sulfate  (90 Base) MCG/ACT inhaler Inhale 2 puffs into the lungs every 6 hours as needed for Wheezing 3/18/22  Yes Luis Frank MD   alendronate (FOSAMAX) 70 MG tablet Take 1 tablet by mouth every 7 days 3/18/22  Yes Luis Frank MD   atorvastatin (LIPITOR) 20 MG tablet Take 1 tablet by mouth daily  Patient taking differently: Take 20 mg by mouth daily Patient taking 2 tabs 3/18/22  Yes Luis Frank MD   budesonide-formoterol (SYMBICORT) 160-4.5 MCG/ACT AERO Inhale 2 puffs into the lungs 2 times daily 3/18/22  Yes Luis Frank MD   ketoconazole (NIZORAL) 2 % shampoo Apply topically daily as needed. 3/18/22  Yes Luis Frank MD   SUMAtriptan (IMITREX) 100 MG tablet TAKE 1 TABLET BY MOUTH FOR 1 DOSE AS NEEDED FOR MIGRAINE 3/18/22  Yes Luis Frank MD   metroNIDAZOLE (METROGEL) 0.75 % gel Apply and rub a thin film twice daily, morning and evening, to entire affected areas after washing.  3/18/22  Yes Clary MD Mary Beth   aspirin 81 MG EC tablet Take 81 mg by mouth daily   Yes Historical Provider, MD   HYDROcodone-acetaminophen (NORCO) 5-325 MG per tablet Take 1 tablet by mouth every 6 hours as needed for Pain. Yes Historical Provider, MD   Cholecalciferol (VITAMIN D PO) Take by mouth   Yes Historical Provider, MD   Ascorbic Acid (VITAMIN C PO) Take by mouth   Yes Historical Provider, MD   lidocaine (LIDODERM) 5 % Place 1 patch onto the skin daily 12 hours on, 12 hours off. 4/29/19   Marcos Arreguin MD       PHYSICAL EXAM        Vitals:    03/30/22 1307   BP: 128/80   Pulse: 67   Resp: 19   SpO2: 97%    Weight: 128 lb 3.2 oz (58.2 kg)     Gen Alert, cooperative, no distress Heart  Regular rate and rhythm,   no murmur   Head Normocephalic, atraumatic, no abnormalities Abd  Soft, NT, +BS, no mass, no OM   Eyes PERRLA, conj/corn clear Ext  Ext nl, AT, no C/C,    edema   Nose Nares normal, no drain age, Non-tender Pulse 2+ and symmetric   Throat Lips, mucosa, tongue normal Skin Color/text/turg nl, no rash/lesions   Neck S/S, TM, NT, no bruit Psych Nl mood and affect   Lung CTA-B, unlabored, no DTP     Ch wall NT, no deform       LABS and Imaging     Relevant and available CV data reviewed  Echo/MRI: none  Cath: none  Holter: none  EKG personally interpreted: 3/30/2022  sinus bradycardia  Stress:none  Moderate Risk  Moderate Complexity/Medical Decision Making  Outside/Care everywhere records Reviewed  Labs Reviewed  Prior Imaging, ekg, cath, echo reviewed when available  Medications reviewed  Old Notes reviewed  10/1/2020 Ct Head   1. CT HEAD: No definite acute intracranial abnormality. 2. Left basal ganglia lacunar stroke is likely remote, but uncommonly is seen   as an acute finding. 3. Small right forehead scalp contusion evident. 4. CT FACE: No acute traumatic injury of the facial bones. 5. Right supraorbital/forehead soft tissue contusion.    6. CT CERVICAL SPINE: No acute abnormality of the cervical spine.   7. Mild degenerative changes cervical spine. * Note that if pain persists or worsens, or if clinically there is concern   for CT occult acute cervical abnormality, flexion/extension C-spine series or   MRI cervical spine may be considered for additional evaluation. ASSESSMENT AND PLAN     1. Shortness of breath and atypical/ possibly cardiac chest pain  -new problem  Plan  - evaluate with a stress echo test    2. Palpitations  - previous cva  - new problem  - Afib is likely  Plan  - monitor to rule out atrial fibrillation  - if this does not show arrhythmia will consider ILR    3. Mixed hyperlipidemia  - on lipitor 20 mg daily  - has been eating out more  - ascvd 12%  Plan:  - recommend she not eat out as much  - lipid panel in 6 months  - continue atorvastatin    4. History of tobacco abuse  - past  plan  - discussed importance of remain free of tobacco    5. History of chemotherapy for breast cancer  - new problem  Plan  - echo    6. Left basal ganglia lacunar stroke  - incidental finding  - would like to rule out atrial fibrillation  Plan  - event  monitor to rule out afib  - if this does not show arrhythmia will consider ILR    7. Essential tremors  - on primidone  - will be seeing  Neurology in August ( previous patient of Dr Camilo Arvizu)    8 edema  - unchanged  Recommend  - not eating out as much  - decrease salt intake    Patient counseled on lifestyle modification, diet, and exercise. Follow Up:  6 months    Dr. Dru Ramachandran:  I, Ranulfo Savage, am scribing for and in the presence of Ruthy Corbin DO. Hope Uribe 03/30/22 1:59 PM           Physician Attestation  The scribe for and in the presence of tiffanie Corbin DO). The scribe Ranulfo Savage RN   may have prepopulated components of this note with my historical  intellectual property under my direct supervision.   Any additions to this intellectual property were performed in my presence and at my direction.   Furthermore, the content and accuracy of this note have been reviewed by me Mike Morales DO).  3/30/2022 1:41 PM

## 2022-03-30 NOTE — PATIENT INSTRUCTIONS
Recommend 150 minutes of exercise weekly  Eat out less  Stress echo  Event monitor  Lipid panel 6 months

## 2022-03-30 NOTE — LETTER
[address] [facility]  [address] [place] [address]  Phone: [phone]  Fax: [address], DO    April 2, 2022     Nolan Carter MD  [facility], [facility] [place] [address]    Patient: Nikolas Karimi   MR Number: [id number]   YOB: 1950   Date of Visit: 3/30/2022       Dear Nolan Carter: Thank you for referring Nikolas Karimi to me for evaluation/treatment. Below are the relevant portions of my assessment and plan of care. If you have questions, please do not hesitate to call me. I look forward to following Rabia Nazario along with you.     Sincerely,      Siddhartha Neri, DO

## 2022-03-30 NOTE — Clinical Note
Thanks for sending over [facility]. I suspect she has afib. Will order a stress echo and 30 day monitor. If monitor is negative, will discuss linq implant as she has had a stroke previously. A lot of stress living with her autistic grandson and unresolved issues with her now  's behavior , I'm glad she's going to see a counselor. Will see her back soon.   Joanna Young

## 2022-04-02 ASSESSMENT — ENCOUNTER SYMPTOMS
ABDOMINAL DISTENTION: 0
ABDOMINAL PAIN: 0
SHORTNESS OF BREATH: 1
NAUSEA: 0
CHEST TIGHTNESS: 1
BLOOD IN STOOL: 0
COUGH: 0
PHOTOPHOBIA: 0

## 2022-04-08 ENCOUNTER — PATIENT MESSAGE (OUTPATIENT)
Dept: FAMILY MEDICINE CLINIC | Age: 72
End: 2022-04-08

## 2022-04-08 RX ORDER — VALACYCLOVIR HYDROCHLORIDE 1 G/1
2000 TABLET, FILM COATED ORAL 2 TIMES DAILY
Qty: 4 TABLET | Refills: 0 | Status: SHIPPED | OUTPATIENT
Start: 2022-04-08

## 2022-04-08 NOTE — TELEPHONE ENCOUNTER
From: Mildred Merida  To: Dr. Lionel Schmid: 4/8/2022 6:43 AM EDT  Subject: COLD SORE    I have a persistent cold sore on my bottom lip, which will not go away. It spreads down my chin a bit, into the inside of my lip, and sometimes on my tongue. It ebbs and flows. Over-the-counter meds have not helped. If there is something that you could prescribe, I would appreciate it. Send it to [facility] on [address]. Thanking you in advance.

## 2022-05-09 ENCOUNTER — TELEPHONE (OUTPATIENT)
Dept: CARDIOLOGY CLINIC | Age: 72
End: 2022-05-09

## 2022-05-09 NOTE — TELEPHONE ENCOUNTER
Please call and tell her that her monitor was unremarkable, no changes  Will wait for stress echo to come back  dkw/mm

## 2022-05-09 NOTE — TELEPHONE ENCOUNTER
Spoke with the patient and advised her of the results below per DKW. Patient voiced understanding .  Call complete

## 2022-05-26 ENCOUNTER — TELEPHONE (OUTPATIENT)
Dept: FAMILY MEDICINE CLINIC | Age: 72
End: 2022-05-26

## 2022-11-12 ENCOUNTER — HOSPITAL ENCOUNTER (EMERGENCY)
Age: 72
Discharge: HOME | End: 2022-11-12
Attending: EMERGENCY MEDICINE
Payer: MEDICARE

## 2022-11-12 VITALS
RESPIRATION RATE: 18 BRPM | OXYGEN SATURATION: 97 % | BODY MASS INDEX: 23.37 KG/M2 | TEMPERATURE: 98.2 F | DIASTOLIC BLOOD PRESSURE: 81 MMHG | SYSTOLIC BLOOD PRESSURE: 109 MMHG | HEIGHT: 62 IN | WEIGHT: 127 LBS | HEART RATE: 87 BPM
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
## 2022-11-12 DIAGNOSIS — Z86.69 HISTORY OF TREMOR: ICD-10-CM
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
## 2022-11-12 PROCEDURE — 99282 EMERGENCY DEPT VISIT SF MDM: CPT

## 2022-11-12 ASSESSMENT — ENCOUNTER SYMPTOMS
NAUSEA: 0
COLOR CHANGE: 0
COUGH: 0
ABDOMINAL PAIN: 0
STRIDOR: 0
DIARRHEA: 0
BACK PAIN: 0
VOMITING: 0
CONSTIPATION: 0
WHEEZING: 0
SHORTNESS OF BREATH: 0

## 2022-11-12 ASSESSMENT — PAIN - FUNCTIONAL ASSESSMENT: PAIN_FUNCTIONAL_ASSESSMENT: NONE - DENIES PAIN

## 2022-11-12 NOTE — ED PROVIDER NOTES
[facility]        Pt Name: Kecia Baird  MRN: [id number]  [place] 1950  Date of evaluation: 11/12/2022  Provider: Afia Pollack PA-C  PCP: Braden Curling, MD  Note Started: 11:07 AM EST 11/12/2022         I have seen and evaluated this patient with my supervising physician Dr Inez Sever   Patient presents with    Fall     Pt fell out of bathtub today, had to be picked up. Has had problems walking has been seen at [facility] and getting work up for parkinson's. Lost her balance, pt does not want treatment today       HISTORY OF PRESENT ILLNESS   (Location, Timing/Onset, Context/Setting, Quality, Duration, Modifying Factors, Severity, Associated Signs and Symptoms)  Note limiting factors. Chief Complaint: Fall out of shower    Kecia Baird is a 66 y.o. female who presents to the emergency department after a fall at home. She states that she lost her balance while she was getting out of the shower. She grabbed a hold of the shower claudette and fell. Her boyfriend helped her back up. She denies any injury from the fall. Denies head trauma or loss of consciousness. She is still able to ambulate but typically requires assistance from her boyfriend or grandson, who lives with her. She does not want any imaging at this time. She simply came for secondary evaluation at the request of her daughter. She sees a neurologist at [facility], Dr. Clementina Cash. She is on primidone for known tremors. She is currently being evaluated for possible Parkinson's. Her slow speech is normal.  She also reports a slow shuffling gait. Nursing Notes were all reviewed and agreed with or any disagreements were addressed in the HPI. REVIEW OF SYSTEMS    (2-9 systems for level 4, 10 or more for level 5)     Review of Systems   Constitutional:  Negative for chills and fever.    HENT: Negative. Eyes:  Negative for visual disturbance. Respiratory:  Negative for cough, shortness of breath, wheezing and stridor. Cardiovascular:  Negative for chest pain, palpitations and leg swelling. Gastrointestinal:  Negative for abdominal pain, constipation, diarrhea, nausea and vomiting. Genitourinary: Negative. Musculoskeletal:  Positive for gait problem. Negative for back pain, joint swelling, myalgias, neck pain and neck stiffness. Skin:  Negative for color change, pallor, rash and wound. Neurological:  Positive for tremors. Negative for dizziness, seizures, syncope, facial asymmetry, speech difficulty, weakness, light-headedness, numbness and headaches. Psychiatric/Behavioral: Negative. All other systems reviewed and are negative. Positives and Pertinent negatives as per HPI. Except as noted above in the ROS, all other systems were reviewed and negative.        PAST MEDICAL HISTORY     Past Medical History:   Diagnosis Date    Acne     Breast cancer ([facility] [address]     Cellulitis     COPD (chronic obstructive pulmonary disease) (HCC)     Essential tremor     GERD (gastroesophageal reflux disease)     Hyperlipidemia     Osteoporosis     Polyp of colon          SURGICAL HISTORY     Past Surgical History:   Procedure Laterality Date    APPENDECTOMY      BREAST SURGERY      COLONOSCOPY N/A 5/2/2019    COLONOSCOPY POLYPECTOMY SNARE/COLD BIOPSY performed by Vonna Mcardle, MD at [address] Right 3/21/15    macular \"hole\"    HAND SURGERY      IR KYPHOPLASTY LUMBAR FIRST LEVEL  3/4/2021    IR KYPHOPLASTY LUMBAR FIRST LEVEL 3/4/2021 [facility] SPECIAL PROCEDURES    MASTECTOMY, BILATERAL Bilateral 1985    ORIF DISTAL RADIUS FRACTURE Right 9/10/2019    RIGHT DISTAL RADIUS OPEN REDUCTION INTERNAL FIXATION -STYKER performed by Radha Miner MD at [address] [place] [address]       Discharge Medication List as of 11/12/2022 11:22 AM CONTINUE these medications which have NOT CHANGED    Details   valACYclovir (VALTREX) 1 g tablet Take 2 tablets by mouth 2 times daily, Disp-4 tablet, R-0Normal      primidone (MYSOLINE) 50 MG tablet Take 1 tablet in the morning, 1 tablet at noon and 2 tablets at night, Disp-360 tablet, R-1Normal      albuterol sulfate  (90 Base) MCG/ACT inhaler Inhale 2 puffs into the lungs every 6 hours as needed for Wheezing, Disp-1 each, R-3Normal      alendronate (FOSAMAX) 70 MG tablet Take 1 tablet by mouth every 7 days, Disp-12 tablet, R-3**Patient requests 90 days supply**Normal      atorvastatin (LIPITOR) 20 MG tablet Take 1 tablet by mouth daily, Disp-90 tablet, R-3**Patient requests 90 days supply**Normal      budesonide-formoterol (SYMBICORT) 160-4.5 MCG/ACT AERO Inhale 2 puffs into the lungs 2 times daily, Disp-1 each, R-3Normal      ketoconazole (NIZORAL) 2 % shampoo Apply topically daily as needed. , Disp-120 mL, R-1, Normal      SUMAtriptan (IMITREX) 100 MG tablet TAKE 1 TABLET BY MOUTH FOR 1 DOSE AS NEEDED FOR MIGRAINE, Disp-9 tablet, R-5Normal      metroNIDAZOLE (METROGEL) 0.75 % gel Apply and rub a thin film twice daily, morning and evening, to entire affected areas after washing., Disp-45 g, R-1, Normal      aspirin 81 MG EC tablet Take 81 mg by mouth dailyHistorical Med      HYDROcodone-acetaminophen (NORCO) 5-325 MG per tablet Take 1 tablet by mouth every 6 hours as needed for Pain. Historical Med      Cholecalciferol (VITAMIN D PO) Take by mouthHistorical Med      Ascorbic Acid (VITAMIN C PO) Take by mouthHistorical Med               ALLERGIES     Vicodin [hydrocodone-acetaminophen], Molds & smuts, Montelukast sodium, and Pollen extract    FAMILYHISTORY       Family History   Problem Relation Age of Onset    Heart Disease Mother           SOCIAL HISTORY       Social History     Tobacco Use    Smoking status: Former     Packs/day: 1.00     Years: 30.00     Pack years: 30.00     Types: Cigarettes Quit date: 3/1/2010     Years since quittin.7    Smokeless tobacco: Never    Tobacco comments:     H.O.smoking 1 p.p.d x 30+ yrs / Quit 3/1/2010   Vaping Use    Vaping Use: Former    Quit date: 3/30/2022   Substance Use Topics    Alcohol use: Yes     Alcohol/week: 1.0 standard drink     Types: 1 Cans of beer per week     Comment: 1 beer/mo. Drug use: Never       SCREENINGS    [place] Coma Scale  Eye Opening: Spontaneous  Best Verbal Response: Oriented  Best Motor Response: Obeys commands  [place] Coma Scale Score: 15        PHYSICAL EXAM    (up to 7 for level 4, 8 or more for level 5)     ED Triage Vitals [22 1049]   BP Temp Temp Source Heart Rate Resp SpO2 Height Weight   109/81 98.2 °F (36.8 °C) Oral 87 18 97 % 5' 2\" (1.575 m) 127 lb (57.6 kg)       Physical Exam  Vitals and nursing note reviewed. Constitutional:       Appearance: Normal appearance. She is well-developed. She is not toxic-appearing or diaphoretic. HENT:      Head: Normocephalic and atraumatic. Right Ear: External ear normal.      Left Ear: External ear normal.      Nose: Nose normal.      Mouth/Throat:      Mouth: Mucous membranes are moist.      Pharynx: Oropharynx is clear. Eyes:      General: No scleral icterus. Right eye: No discharge. Left eye: No discharge. Extraocular Movements: Extraocular movements intact. Conjunctiva/sclera: Conjunctivae normal.      Pupils: Pupils are equal, round, and reactive to light. Cardiovascular:      Rate and Rhythm: Normal rate. Pulmonary:      Effort: Pulmonary effort is normal.      Breath sounds: Normal breath sounds. Abdominal:      General: Bowel sounds are normal.      Palpations: Abdomen is soft. Tenderness: There is no abdominal tenderness. Musculoskeletal:         General: Normal range of motion.       Right shoulder: Normal.      Left shoulder: Normal.      Right elbow: Normal.      Left elbow: Normal.      Right wrist: Normal.      Left wrist: Normal.      Right hand: Normal.      Cervical back: Normal and normal range of motion. Thoracic back: Normal.      Lumbar back: Normal.      Right hip: Normal.      Left hip: Normal.      Right knee: Normal.      Left knee: Normal.      Right ankle: Normal.      Left ankle: Normal.   Skin:     General: Skin is warm and dry. Capillary Refill: Capillary refill takes less than 2 seconds. Coloration: Skin is not jaundiced or pale. Findings: No bruising, erythema, lesion or rash. Neurological:      General: No focal deficit present. Mental Status: She is alert and oriented to person, place, and time. Cranial Nerves: No cranial nerve deficit (II-XII intact). Comments: Mild intentional tremor. No pronator drift, facial droop or slurred speech. Speech is slow. Movements are slow but symmetric and with good strength. Normal finger to nose coordination. 5 out of 5 strength in all 4 extremities without focal weakness, paresthesia or radiculopathy. Psychiatric:         Mood and Affect: Mood normal.         Behavior: Behavior normal.       DIAGNOSTIC RESULTS   LABS:    Labs Reviewed - No data to display    When ordered only abnormal lab results are displayed. All other labs were within normal range or not returned as of this dictation. EKG: When ordered, EKG's are interpreted by the Emergency Department Physician in the absence of a cardiologist.  Please see their note for interpretation of EKG. RADIOLOGY:   Non-plain film images such as CT, Ultrasound and MRI are read by the radiologist. Plain radiographic images are visualized and preliminarily interpreted by the ED Provider with the below findings:        Interpretation per the Radiologist below, if available at the time of this note:    No orders to display     No results found.         PROCEDURES   Unless otherwise noted below, none     Procedures    CRITICAL CARE TIME   N/a    CONSULTS:  None      EMERGENCY DEPARTMENT COURSE and DIFFERENTIAL DIAGNOSIS/MDM:   Vitals:    Vitals:    11/12/22 1049   BP: 109/81   Pulse: 87   Resp: 18   Temp: 98.2 °F (36.8 °C)   TempSrc: Oral   SpO2: 97%   Weight: 127 lb (57.6 kg)   Height: 5' 2\" (1.575 m)       Patient was given the following medications:  Medications - No data to display      Is this patient to be included in the SEP-1 Core Measure due to severe sepsis or septic shock? No   Exclusion criteria - the patient is NOT to be included for SEP-1 Core Measure due to: Infection is not suspected    This patient presents to the emergency department after a witnessed fall in her bathroom today. She is able to stand and ambulate here. She does have a chronic shuffling gait but has assistance at home with family and boyfriend. There was no head trauma or other injury with this. She denies any pain at this time. She declines any blood work or imaging at this time. She would like to go home. She does not request a referral to neurology, as she is already seeing a specialist at [address] [place] [address] 90 are stable here, and patient is stable for discharge. She is alert and oriented x4.   My suspicion is low for ACS, PE, myocarditis, pericarditis, endocarditis, acute pulmonary edema, pleural effusion, pericardial effusion, cardiac tamponade, cardiomyopathy, CHF exacerbation, thoracic aortic dissection, esophageal rupture, other life-threatening arrhythmia, hemothorax, pulmonary contusion, subcutaneous emphysema, flail chest, pneumo mediastinum, rib fracture, pneumonia, pneumothorax, ARDS, carotid dissection, sinus abscess, acute fracture, acute CVA, ICH, SAH, TIA, meningitis, encephalitis, pseudotumor cerebri, temporal arteritis, sentinel bleed from ruptured aneurysm, hypertensive urgency or emergency, subdural hematoma, epidural hematoma, cerebellar compromise, posterior stroke, sepsis, DKA, acute spine fracture or dislocation, epidural abscess or hematoma, discitis, meningitis, encephalitis, transverse myelitis, cauda quina,  pyelonephritis, perinephric abscess, urosepsis, kidney stone, AAA, dissection, shingles, subungual hematoma, paronychia, eponychia, felon, flexor tenosynovitis, thoracic outlet obstruction, SVC syndrome, foreign body, tendon rupture, compartment syndrome, acute fracture, dislocation, DVT, arterial compromise or occlusion, limb ischemia, gout, septic joint, abscess, cellulitis, osteomyelitis, gonococcal arthritis, SCFE, avascular necrosis, Osgood-Schlatter syndrome, or other concerning pathology. FINAL IMPRESSION      1. Fall at home, initial encounter    2. History of tremor          DISPOSITION/PLAN   DISPOSITION Decision To Discharge 11/12/2022 11:27:48 AM      PATIENT REFERRED TO:  Penny Perez MD  [address] [place] [address]  [phone]    In 3 days      [facility] Emergency Department  [address] [place] [address]  [phone]    If symptoms worsen      DISCHARGE MEDICATIONS:  Discharge Medication List as of 11/12/2022 11:22 AM          DISCONTINUED MEDICATIONS:  Discharge Medication List as of 11/12/2022 11:22 AM                 (Please note that portions of this note were completed with a voice recognition program.  Efforts were made to edit the dictations but occasionally words are mis-transcribed.)    Quan Wright PA-C (electronically signed)           Quan Wright PA-C  11/12/22 0399

## 2022-11-13 NOTE — ED PROVIDER NOTES
In addition to the advanced practice provider, I personally saw Jp Lynn and performed a substantive portion of the visit including all aspects of the medical decision making. Briefly, this is a 66 y.o. female here for fall which occurred just prior travel. Patient states that she was getting out of the shower when she slipped and fell, landed on her buttocks. She denies any head injury or loss of consciousness. Patient reports chronic low back pain, no significant change since falling. She denies any other trauma or other painful complaint. States that she has Parkinson's disease and is currently being evaluated at [facility]. Typically ambulates with the assistance of family members at baseline. Patient states she has multiple family was at home who help keep an eye over her. She only came to the emergency department today at the urging of her daughter to be evaluated since she fell. On exam, patient afebrile and nontoxic. No distress. Heart RRR. Lungs CTAB. Abdomen soft, nondistended, nontender to palpation in all quadrants. No midline cervical tenderness, neck full range of motion without pain. No midline thoracic tenderness. Diffuse lumbar paraspinal and midline tenderness at L4 and L5 without step-offs or overlying skin changes. CN 2-12 intact. 5/5 motor and sensation grossly intact all extremities. No pronator drift. Normal finger to nose. Slow but steady gait with modest assistance of myself in the ED, patient states her gait is unchanged from baseline. Screenings   [place] Coma Scale  Eye Opening: Spontaneous  Best Verbal Response: Oriented  Best Motor Response: Obeys commands  Lee Coma Scale Score: 15          MDM    Patient afebrile and nontoxic. No distress. She has no external evidence of injury and denies any symptomatic complaints. Neuro exam without focal deficits, nothing to suggest cord injury or CVA. Fall mechanical by history, syncope not suspected. Patient does have some midline lumbar tenderness, no red flags for cauda equina. Patient states her back pain is chronic, however given her recent fall onto her low back and buttocks I did recommend plain film imaging which patient refused. Patient states she feels well and declines to have any testing whatsoever performed here in the emergency department. She is alert, fully oriented, no clinical evidence of intoxication and has the capacity to make her own medical decisions. Will discharge patient to care of family as per her wishes. Strict return precautions were discussed at length. I Dr. Garry Helton am the primary clinician of record. Patient Referrals:  Stefanie Vincent MD  [address] [place] [address] [address] [facility]  [phone]    In 3 days      [facility] Emergency Department  [address] [facility]  [address] [address]  [phone]    If symptoms worsen      Discharge Medications:  Discharge Medication List as of 11/12/2022 11:22 AM          FINAL IMPRESSION  1. Fall at home, initial encounter    2. History of tremor        Blood pressure 109/81, pulse 87, temperature 98.2 °F (36.8 °C), temperature source Oral, resp. rate 18, height 5' 2\" (1.575 m), weight 127 lb (57.6 kg), SpO2 97 %, not currently breastfeeding. For further details of [address] emergency department encounter, please see documentation by advanced practice provider, JORGE L Colvin.     James Szymanski DO (electronically signed)  Attending Emergency Physician       James Szymanski DO  11/12/22 2005

## 2022-11-15 ENCOUNTER — CARE COORDINATION (OUTPATIENT)
Dept: CARE COORDINATION | Age: 72
End: 2022-11-15

## 2022-11-15 DIAGNOSIS — J43.9 PULMONARY EMPHYSEMA, UNSPECIFIED EMPHYSEMA TYPE (HCC): Primary | ICD-10-CM

## 2022-11-15 NOTE — PROGRESS NOTES
11/15/22 4:23 PM       Remote Patient Monitoring Treatment Plan    Received request from ACM/CTRUI Titus RN to order remote patient monitoring for in home monitoring of COPD and order completed. Patient will be monitoring blood pressure   pulse ox   weight  survey questions daily. Patient will engage in Remote Patient Monitoring each day to develop the skills necessary for self management. RPM Care Team Responsibilities:   Alerts will be reviewed daily and addressed within 2-4 hours during operational hours (Monday -Friday 9 am-4 pm)  Alert response and intervention documented in patient medical record  Alert response escalated to PCP per protocol and documented in patient medical record  Patient monitored over approximately  days  Discharge from program based on self-management readiness    See care coordination encounters for additional details.       Iglesia Antonio DNP, FNP-C, Remote Patient Monitoring NP, () [phone]
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
## 2022-11-15 NOTE — CARE COORDINATION
Remote Patient Kit Ordering Note      Date/Time:  11/15/2022 3:01 PM      [x] CCSS confirmed patient shipping address  [x] Patient will receive package over the next 2-4 business days. Someone 21 years or older must be present to sign for UPS delivery. [x] Patient to contact virtual installation-specific phone number listed in the patient instructions. [x] If the patient does not contact HRS within 24 hours, an Healthcare Interactive[employer] Ambassador [facility] [place] will call the patient directly: If the patient does not answer, HRS will follow up with the clinical team notifying them about the unsuccessful attempt to contact the patient. HRS will make three call attempts to the patient. [x] LPN will contact patient once equipment is active to welcome them to the program.                                                         [x] Hours of RPM monitoring - Monday-Friday [id number]                     All questions answered at this time. ACM made aware the RPM kit has been ordered. CCSS notified patient of RPM equipment order.

## 2022-11-18 ENCOUNTER — CARE COORDINATION (OUTPATIENT)
Dept: CARE COORDINATION | Age: 72
End: 2022-11-18

## 2022-11-18 DIAGNOSIS — J43.9 PULMONARY EMPHYSEMA, UNSPECIFIED EMPHYSEMA TYPE (HCC): Primary | ICD-10-CM

## 2022-11-18 PROBLEM — N28.89 RENAL MASS, RIGHT: Status: ACTIVE | Noted: 2022-11-18

## 2022-11-18 NOTE — PROGRESS NOTES
11/18/22 2:27 PM       Remote Patient Order Discontinued    Received request from Sally Wren RN to discontinue order for remote patient monitoring of COPD and order completed.       Jacqueline Edwards DNP, FNP-C, Remote Patient Monitoring NP, () [phone]

## 2022-11-18 NOTE — CARE COORDINATION
Received notice from [facility] patient no longer wants to participate in RPM program.    Return request has been submitted. Call to patient to inform of UPS  in 1-4 business days and to pack equipment in original box, patient verbalized understanding. Will update with UPS tracking once received.

## 2022-11-18 NOTE — CARE COORDINATION
Patient contacted RPM team stating she no longer wants to participate in RPM. ACM routed request via Epic to initiate equipment return for patient.

## 2022-11-19 NOTE — PROGRESS NOTES
Martine White is a 66 y.o. female. HPI:  Accompanied by one of her daughters today  Here for ED f/u after fall   Hx essential tremor,  took too many primidone and then fell   11/12/22    Sees Neurology at [facility], has upcoming appointment soon    Offer flu vaccine today  Talk with neurology about updated COVID booster    Pt here for Hospital Follow Up with  Seen date: 11/12/22  Notes, labs, imaging and any procedures= reviewed from recent hospitalization    Offer  flu vaccine today  Offer physical therapy/declines due to lack of transportation    Meds, vitamins and allergies reviewed with pt  Wt Readings from Last 3 Encounters:   11/21/22 128 lb (58.1 kg)   11/12/22 127 lb (57.6 kg)   03/30/22 128 lb 3.2 oz (58.2 kg)       REVIEW OF SYSTEMS:   CONSTITUTIONAL: See history of present illness,   Weight noted-stable  HEENT: No new vision difficulties or ringing in the ears. RESPIRATORY: No new SOB, PND, orthopnea or cough. CARDIOVASCULAR: no CP, palpitations or SOB with exertion  GI: No nausea, vomiting, diarrhea, constipation, abdominal pain or changes in bowel habits. : No urinary frequency, urgency, incontinence hematuria or dysuria. SKIN: No cyanosis or skin lesions. MUSCULOSKELETAL: No new muscle or joint pain. Recovering from fall nicely  NEUROLOGICAL: No syncope or TIA-like symptoms. PSYCHIATRIC: No anxiety, insomnia or depression     Allergies   Allergen Reactions    Vicodin [Hydrocodone-Acetaminophen] Shortness Of Breath    Molds & Smuts Other (See Comments)     congestion    Montelukast Sodium Other (See Comments)     Vision issues    Pollen Extract Other (See Comments)     congestion       Prior to Visit Medications    Medication Sig Taking?  Authorizing Provider   valACYclovir (VALTREX) 1 g tablet Take 2 tablets by mouth 2 times daily Yes Ricardo Arthur MD   primidone (MYSOLINE) 50 MG tablet Take 1 tablet in the morning, 1 tablet at noon and 2 tablets at night Yes Rika Oliva MD Mary Beth   albuterol sulfate  (90 Base) MCG/ACT inhaler Inhale 2 puffs into the lungs every 6 hours as needed for Wheezing Yes Andrzej Doshi MD   alendronate (FOSAMAX) 70 MG tablet Take 1 tablet by mouth every 7 days Yes Andrzej Doshi MD   atorvastatin (LIPITOR) 20 MG tablet Take 1 tablet by mouth daily  Patient taking differently: Take 20 mg by mouth daily Patient taking 2 tabs Yes Andrzej Doshi MD   budesonide-formoterol (SYMBICORT) 160-4.5 MCG/ACT AERO Inhale 2 puffs into the lungs 2 times daily Yes Andrzej Doshi MD   ketoconazole (NIZORAL) 2 % shampoo Apply topically daily as needed. Yes Andrzej Doshi MD   SUMAtriptan (IMITREX) 100 MG tablet TAKE 1 TABLET BY MOUTH FOR 1 DOSE AS NEEDED FOR MIGRAINE Yes Andrzej Doshi MD   metroNIDAZOLE (METROGEL) 0.75 % gel Apply and rub a thin film twice daily, morning and evening, to entire affected areas after washing. Yes Andrzej Doshi MD   aspirin 81 MG EC tablet Take 81 mg by mouth daily Yes Historical Provider, MD   HYDROcodone-acetaminophen (NORCO) 5-325 MG per tablet Take 1 tablet by mouth every 6 hours as needed for Pain.  Yes Historical Provider, MD   Cholecalciferol (VITAMIN D PO) Take by mouth Yes Historical Provider, MD   Ascorbic Acid (VITAMIN C PO) Take by mouth Yes Historical Provider, MD       Past Medical History:   Diagnosis Date    Acne     Breast cancer ([facility] [address]     Cellulitis     COPD (chronic obstructive pulmonary disease) (HCC)     Essential tremor     GERD (gastroesophageal reflux disease)     Hyperlipidemia     Osteoporosis     Polyp of colon     Renal mass, right 2022       Social History     Tobacco Use    Smoking status: Former     Packs/day: 1.00     Years: 30.00     Pack years: 30.00     Types: Cigarettes     Quit date: 3/1/2010     Years since quittin.7    Smokeless tobacco: Never    Tobacco comments:     H.O.smoking 1 p.p.d x 30+ yrs / Quit 3/1/2010   Substance Use Topics    Alcohol use: Yes     Alcohol/week: 1.0 standard drink     Types: 1 Cans of beer per week     Comment: 1 beer/mo. Family History   Problem Relation Age of Onset    Heart Disease Mother        OBJECTIVE:  /80 (Site: Right Upper Arm, Position: Sitting, Cuff Size: Small Adult)   Pulse 74   Resp 16   Ht 5' 2\" (1.575 m)   Wt 128 lb (58.1 kg)   SpO2 97%   BMI 23.41 kg/m²   GEN:  in NAD  HEENT:  NCAT  NECK:  Supple without adenopathy. CV:  Regular rate and rhythm, S1 and S2 normal, no murmurs, clicks  PULM:  Chest is clear, no wheezing ,  symmetric air entry throughout both lung fields. ABD: Soft, NT, no masses appreciated  EXT: No rash or edema  NEURO: Alert oriented ×3, nonfocal, no assistive device    ER records reviewed    ASSESSMENT/PLAN:  1. Encounter for examination following treatment at hospital  Stable, follow-up with neurology    2. Flu vaccine need  Vaccinate today    3.  Intention tremor  Sees neurology    For AWV with Dr. Yady Posada in the future    25 Total Minutes spent pre charting (reviewing problem list, meds, any test results, consultant and hospital notes ) and  obtaining present visit history, performing appropriate medical exam/evaluation, counseling and educating the patient (and family), ordering medications ,tests, and procedures as needed, refilling medication(s), placing referral(s) when needed in addition to coordinating care for this patient and documenting in electronic health record

## 2022-11-21 ENCOUNTER — OFFICE VISIT (OUTPATIENT)
Dept: FAMILY MEDICINE CLINIC | Age: 72
End: 2022-11-21
Payer: MEDICARE

## 2022-11-21 VITALS
WEIGHT: 128 LBS | SYSTOLIC BLOOD PRESSURE: 120 MMHG | DIASTOLIC BLOOD PRESSURE: 80 MMHG | OXYGEN SATURATION: 97 % | RESPIRATION RATE: 16 BRPM | HEART RATE: 74 BPM | BODY MASS INDEX: 23.55 KG/M2 | HEIGHT: 62 IN

## 2022-11-21 DIAGNOSIS — Z09 ENCOUNTER FOR EXAMINATION FOLLOWING TREATMENT AT HOSPITAL: Primary | ICD-10-CM

## 2022-11-21 DIAGNOSIS — G25.2 INTENTION TREMOR: ICD-10-CM

## 2022-11-21 DIAGNOSIS — Z23 FLU VACCINE NEED: ICD-10-CM

## 2022-11-21 PROCEDURE — 1090F PRES/ABSN URINE INCON ASSESS: CPT | Performed by: FAMILY MEDICINE

## 2022-11-21 PROCEDURE — G8399 PT W/DXA RESULTS DOCUMENT: HCPCS | Performed by: FAMILY MEDICINE

## 2022-11-21 PROCEDURE — G8484 FLU IMMUNIZE NO ADMIN: HCPCS | Performed by: FAMILY MEDICINE

## 2022-11-21 PROCEDURE — G0008 ADMIN INFLUENZA VIRUS VAC: HCPCS | Performed by: FAMILY MEDICINE

## 2022-11-21 PROCEDURE — G8427 DOCREV CUR MEDS BY ELIG CLIN: HCPCS | Performed by: FAMILY MEDICINE

## 2022-11-21 PROCEDURE — G8420 CALC BMI NORM PARAMETERS: HCPCS | Performed by: FAMILY MEDICINE

## 2022-11-21 PROCEDURE — 3017F COLORECTAL CA SCREEN DOC REV: CPT | Performed by: FAMILY MEDICINE

## 2022-11-21 PROCEDURE — 1036F TOBACCO NON-USER: CPT | Performed by: FAMILY MEDICINE

## 2022-11-21 PROCEDURE — 99213 OFFICE O/P EST LOW 20 MIN: CPT | Performed by: FAMILY MEDICINE

## 2022-11-21 PROCEDURE — 1123F ACP DISCUSS/DSCN MKR DOCD: CPT | Performed by: FAMILY MEDICINE

## 2022-11-21 PROCEDURE — 90694 VACC AIIV4 NO PRSRV 0.5ML IM: CPT | Performed by: FAMILY MEDICINE

## 2022-11-29 ENCOUNTER — OFFICE VISIT (OUTPATIENT)
Dept: FAMILY MEDICINE CLINIC | Age: 72
End: 2022-11-29
Payer: MEDICARE

## 2022-11-29 VITALS
HEART RATE: 74 BPM | OXYGEN SATURATION: 97 % | BODY MASS INDEX: 23.96 KG/M2 | SYSTOLIC BLOOD PRESSURE: 124 MMHG | WEIGHT: 131 LBS | DIASTOLIC BLOOD PRESSURE: 80 MMHG

## 2022-11-29 DIAGNOSIS — N28.89 RIGHT RENAL MASS: Primary | ICD-10-CM

## 2022-11-29 PROCEDURE — 1036F TOBACCO NON-USER: CPT | Performed by: FAMILY MEDICINE

## 2022-11-29 PROCEDURE — G8420 CALC BMI NORM PARAMETERS: HCPCS | Performed by: FAMILY MEDICINE

## 2022-11-29 PROCEDURE — 3017F COLORECTAL CA SCREEN DOC REV: CPT | Performed by: FAMILY MEDICINE

## 2022-11-29 PROCEDURE — G8484 FLU IMMUNIZE NO ADMIN: HCPCS | Performed by: FAMILY MEDICINE

## 2022-11-29 PROCEDURE — 99213 OFFICE O/P EST LOW 20 MIN: CPT | Performed by: FAMILY MEDICINE

## 2022-11-29 PROCEDURE — 1123F ACP DISCUSS/DSCN MKR DOCD: CPT | Performed by: FAMILY MEDICINE

## 2022-11-29 PROCEDURE — 1090F PRES/ABSN URINE INCON ASSESS: CPT | Performed by: FAMILY MEDICINE

## 2022-11-29 PROCEDURE — G8399 PT W/DXA RESULTS DOCUMENT: HCPCS | Performed by: FAMILY MEDICINE

## 2022-11-29 PROCEDURE — G8427 DOCREV CUR MEDS BY ELIG CLIN: HCPCS | Performed by: FAMILY MEDICINE

## 2022-11-29 NOTE — PROGRESS NOTES
Portillo Carbaajl is a 66 y.o. female. HPI:  Here to review MRI of LS Spine  Incidental 5.5 x 6.1 cm right renal mass  Still gets low back pain, awaiting joy  Mentions she is engaged to be wed  in 2-3 weeks  Wt Readings from Last 3 Encounters:   11/29/22 131 lb (59.4 kg)   11/21/22 128 lb (58.1 kg)   11/12/22 127 lb (57.6 kg)     Meds, vitamins and allergies reviewed with Patient    ROS:  Gen: no fever  HEENT:no  cold symptoms, sore throat. CV:  Denies chest pain or palpitations. Pulm:  Denies shortness of breath, cough. Abd:  Denies abdominal pain, nausea and vomiting. Skin: no rash    Allergies   Allergen Reactions    Vicodin [Hydrocodone-Acetaminophen] Shortness Of Breath    Molds & Smuts Other (See Comments)     congestion    Montelukast Sodium Other (See Comments)     Vision issues    Pollen Extract Other (See Comments)     congestion       Prior to Visit Medications    Medication Sig Taking? Authorizing Provider   valACYclovir (VALTREX) 1 g tablet Take 2 tablets by mouth 2 times daily Yes Julieta Mckeon MD   primidone (MYSOLINE) 50 MG tablet Take 1 tablet in the morning, 1 tablet at noon and 2 tablets at night Yes Ele He MD   albuterol sulfate  (90 Base) MCG/ACT inhaler Inhale 2 puffs into the lungs every 6 hours as needed for Wheezing Yes Ele He MD   alendronate (FOSAMAX) 70 MG tablet Take 1 tablet by mouth every 7 days Yes Ele He MD   atorvastatin (LIPITOR) 20 MG tablet Take 1 tablet by mouth daily  Patient taking differently: Take 20 mg by mouth daily Patient taking 2 tabs Yes Ele He MD   budesonide-formoterol (SYMBICORT) 160-4.5 MCG/ACT AERO Inhale 2 puffs into the lungs 2 times daily Yes Ele He MD   ketoconazole (NIZORAL) 2 % shampoo Apply topically daily as needed.  Yes Ele He MD   SUMAtriptan (IMITREX) 100 MG tablet TAKE 1 TABLET BY MOUTH FOR 1 DOSE AS NEEDED FOR MIGRAINE Yes Ron Mazariegos MD Mary Beth   aspirin 81 MG EC tablet Take 81 mg by mouth daily Yes Historical Provider, MD   HYDROcodone-acetaminophen (NORCO) 5-325 MG per tablet Take 1 tablet by mouth every 6 hours as needed for Pain. Yes Historical Provider, MD   Cholecalciferol (VITAMIN D PO) Take by mouth Yes Historical Provider, MD   Ascorbic Acid (VITAMIN C PO) Take by mouth Yes Historical Provider, MD       OBJECTIVE:  /80   Pulse 74   Wt 131 lb (59.4 kg)   SpO2 97%   BMI 23.96 kg/m²   GEN:  in NAD  NECK:  Supple without adenopathy.   No bruits negative CVAT  CV:  Regular rate and rhythm, S1 and S2 normal, no murmurs, clicks  PULM:  Chest is clear, no wheezing ,  symmetric air  EXT: No rash or edema  NEURO: Alert and oriented ×3  Lab Results   Component Value Date/Time     03/18/2022 10:24 AM    K 4.3 03/18/2022 10:24 AM     03/18/2022 10:24 AM    CO2 24 03/18/2022 10:24 AM    BUN 10 03/18/2022 10:24 AM    CREATININE 0.6 03/18/2022 10:24 AM    GLUCOSE 85 03/18/2022 10:24 AM    CALCIUM 9.5 03/18/2022 10:24 AM       ASSESSMENT/PLAN:  1Right renal mass  Refer to urology  Renal u/s  Repeat bmp    Low back pain  F/u with back doctor, candidate for epidural steroid injection    IMM - had flu shot  Had covid booster, urged new booster at [facility]

## 2022-12-27 ENCOUNTER — HOSPITAL ENCOUNTER (OUTPATIENT)
Dept: CT IMAGING | Age: 72
Discharge: HOME | End: 2022-12-27
Payer: MEDICARE

## 2022-12-27 DIAGNOSIS — N28.89 OTHER SPECIFIED DISORDERS OF KIDNEY AND URETER: ICD-10-CM

## 2022-12-27 LAB
ANION GAP SERPL CALCULATED.3IONS-SCNC: 9 MMOL/L (ref 3–16)
BASOPHILS ABSOLUTE: 0 K/UL (ref 0–0.2)
BASOPHILS RELATIVE PERCENT: 0.6 %
BUN BLDV-MCNC: 9 MG/DL (ref 7–20)
CALCIUM SERPL-MCNC: 9.1 MG/DL (ref 8.3–10.6)
CHLORIDE BLD-SCNC: 101 MMOL/L (ref 99–110)
CO2: 27 MMOL/L (ref 21–32)
CREAT SERPL-MCNC: 0.5 MG/DL (ref 0.6–1.2)
EOSINOPHILS ABSOLUTE: 0 K/UL (ref 0–0.6)
EOSINOPHILS RELATIVE PERCENT: 0.6 %
GFR SERPL CREATININE-BSD FRML MDRD: >60 ML/MIN/{1.73_M2}
GLUCOSE BLD-MCNC: 96 MG/DL (ref 70–99)
HCT VFR BLD CALC: 43.2 % (ref 36–48)
HEMOGLOBIN: 14.1 G/DL (ref 12–16)
LYMPHOCYTES ABSOLUTE: 2.1 K/UL (ref 1–5.1)
LYMPHOCYTES RELATIVE PERCENT: 26 %
MCH RBC QN AUTO: 32.2 PG (ref 26–34)
MCHC RBC AUTO-ENTMCNC: 32.8 G/DL (ref 31–36)
MCV RBC AUTO: 98.3 FL (ref 80–100)
MONOCYTES ABSOLUTE: 0.4 K/UL (ref 0–1.3)
MONOCYTES RELATIVE PERCENT: 5.4 %
NEUTROPHILS ABSOLUTE: 5.3 K/UL (ref 1.7–7.7)
NEUTROPHILS RELATIVE PERCENT: 67.4 %
PDW BLD-RTO: 12.8 % (ref 12.4–15.4)
PLATELET # BLD: 269 K/UL (ref 135–450)
PMV BLD AUTO: 8 FL (ref 5–10.5)
POTASSIUM SERPL-SCNC: 4.3 MMOL/L (ref 3.5–5.1)
RBC # BLD: 4.39 M/UL (ref 4–5.2)
SODIUM BLD-SCNC: 137 MMOL/L (ref 136–145)
WBC # BLD: 7.9 K/UL (ref 4–11)

## 2022-12-27 PROCEDURE — 80048 BASIC METABOLIC PNL TOTAL CA: CPT

## 2022-12-27 PROCEDURE — 36415 COLL VENOUS BLD VENIPUNCTURE: CPT

## 2022-12-27 PROCEDURE — 6360000004 HC RX CONTRAST MEDICATION: Performed by: UROLOGY

## 2022-12-27 PROCEDURE — 74178 CT ABD&PLV WO CNTR FLWD CNTR: CPT

## 2022-12-27 PROCEDURE — 85025 COMPLETE CBC W/AUTO DIFF WBC: CPT

## 2022-12-27 RX ADMIN — IOPAMIDOL 75 ML: 755 INJECTION, SOLUTION INTRAVENOUS at 16:08

## 2023-01-04 PROBLEM — C50.911 BILATERAL MALIGNANT NEOPLASM OF BREAST IN FEMALE, UNSPECIFIED ESTROGEN RECEPTOR STATUS, UNSPECIFIED SITE OF BREAST (HCC): Status: ACTIVE | Noted: 2023-01-04

## 2023-01-04 PROBLEM — C50.912 BILATERAL MALIGNANT NEOPLASM OF BREAST IN FEMALE, UNSPECIFIED ESTROGEN RECEPTOR STATUS, UNSPECIFIED SITE OF BREAST (HCC): Status: ACTIVE | Noted: 2023-01-04

## 2023-01-05 ENCOUNTER — OFFICE VISIT (OUTPATIENT)
Dept: FAMILY MEDICINE CLINIC | Age: 73
End: 2023-01-05
Payer: MEDICARE

## 2023-01-05 VITALS
BODY MASS INDEX: 23.92 KG/M2 | SYSTOLIC BLOOD PRESSURE: 120 MMHG | HEIGHT: 62 IN | WEIGHT: 130 LBS | HEART RATE: 78 BPM | DIASTOLIC BLOOD PRESSURE: 80 MMHG | OXYGEN SATURATION: 96 %

## 2023-01-05 DIAGNOSIS — G25.2 INTENTION TREMOR: ICD-10-CM

## 2023-01-05 DIAGNOSIS — C50.912 BILATERAL MALIGNANT NEOPLASM OF BREAST IN FEMALE, UNSPECIFIED ESTROGEN RECEPTOR STATUS, UNSPECIFIED SITE OF BREAST (HCC): Primary | ICD-10-CM

## 2023-01-05 DIAGNOSIS — J43.8 OTHER EMPHYSEMA (HCC): ICD-10-CM

## 2023-01-05 DIAGNOSIS — N28.89 RENAL MASS, RIGHT: ICD-10-CM

## 2023-01-05 DIAGNOSIS — Z01.811 PRE-OP CHEST EXAM: ICD-10-CM

## 2023-01-05 DIAGNOSIS — C50.911 BILATERAL MALIGNANT NEOPLASM OF BREAST IN FEMALE, UNSPECIFIED ESTROGEN RECEPTOR STATUS, UNSPECIFIED SITE OF BREAST (HCC): Primary | ICD-10-CM

## 2023-01-05 DIAGNOSIS — M81.8 OTHER OSTEOPOROSIS WITHOUT CURRENT PATHOLOGICAL FRACTURE: ICD-10-CM

## 2023-01-05 DIAGNOSIS — R93.5 ABNORMAL CT SCAN, PELVIS: ICD-10-CM

## 2023-01-05 DIAGNOSIS — E78.5 HYPERLIPIDEMIA, UNSPECIFIED HYPERLIPIDEMIA TYPE: ICD-10-CM

## 2023-01-05 PROCEDURE — G8484 FLU IMMUNIZE NO ADMIN: HCPCS | Performed by: FAMILY MEDICINE

## 2023-01-05 PROCEDURE — 3017F COLORECTAL CA SCREEN DOC REV: CPT | Performed by: FAMILY MEDICINE

## 2023-01-05 PROCEDURE — 1123F ACP DISCUSS/DSCN MKR DOCD: CPT | Performed by: FAMILY MEDICINE

## 2023-01-05 PROCEDURE — G8427 DOCREV CUR MEDS BY ELIG CLIN: HCPCS | Performed by: FAMILY MEDICINE

## 2023-01-05 PROCEDURE — G8420 CALC BMI NORM PARAMETERS: HCPCS | Performed by: FAMILY MEDICINE

## 2023-01-05 PROCEDURE — 3023F SPIROM DOC REV: CPT | Performed by: FAMILY MEDICINE

## 2023-01-05 PROCEDURE — G8399 PT W/DXA RESULTS DOCUMENT: HCPCS | Performed by: FAMILY MEDICINE

## 2023-01-05 PROCEDURE — 1036F TOBACCO NON-USER: CPT | Performed by: FAMILY MEDICINE

## 2023-01-05 PROCEDURE — 1090F PRES/ABSN URINE INCON ASSESS: CPT | Performed by: FAMILY MEDICINE

## 2023-01-05 PROCEDURE — 99214 OFFICE O/P EST MOD 30 MIN: CPT | Performed by: FAMILY MEDICINE

## 2023-01-05 ASSESSMENT — PATIENT HEALTH QUESTIONNAIRE - PHQ9
2. FEELING DOWN, DEPRESSED OR HOPELESS: 0
4. FEELING TIRED OR HAVING LITTLE ENERGY: 0
SUM OF ALL RESPONSES TO PHQ QUESTIONS 1-9: 0
6. FEELING BAD ABOUT YOURSELF - OR THAT YOU ARE A FAILURE OR HAVE LET YOURSELF OR YOUR FAMILY DOWN: 0
SUM OF ALL RESPONSES TO PHQ QUESTIONS 1-9: 0
1. LITTLE INTEREST OR PLEASURE IN DOING THINGS: 0
SUM OF ALL RESPONSES TO PHQ QUESTIONS 1-9: 0
8. MOVING OR SPEAKING SO SLOWLY THAT OTHER PEOPLE COULD HAVE NOTICED. OR THE OPPOSITE, BEING SO FIGETY OR RESTLESS THAT YOU HAVE BEEN MOVING AROUND A LOT MORE THAN USUAL: 0
SUM OF ALL RESPONSES TO PHQ9 QUESTIONS 1 & 2: 0
7. TROUBLE CONCENTRATING ON THINGS, SUCH AS READING THE NEWSPAPER OR WATCHING TELEVISION: 0
SUM OF ALL RESPONSES TO PHQ QUESTIONS 1-9: 0
3. TROUBLE FALLING OR STAYING ASLEEP: 0
5. POOR APPETITE OR OVEREATING: 0
10. IF YOU CHECKED OFF ANY PROBLEMS, HOW DIFFICULT HAVE THESE PROBLEMS MADE IT FOR YOU TO DO YOUR WORK, TAKE CARE OF THINGS AT HOME, OR GET ALONG WITH OTHER PEOPLE: 0
9. THOUGHTS THAT YOU WOULD BE BETTER OFF DEAD, OR OF HURTING YOURSELF: 0

## 2023-01-05 NOTE — PROGRESS NOTES
Chief Complaint:     Rafael Leigh is a 74 y.o. female who presents for a preoperative physical examination. She is scheduled to have right nephrectomy done by Dr. Meenu Vega at [facility] on 2-4-23.     History of Present Illness:      Renal mass    Past Medical History:   Diagnosis Date    Acne     Breast cancer ([facility] [address]     Cellulitis     COPD (chronic obstructive pulmonary disease) (HCC)     Essential tremor     GERD (gastroesophageal reflux disease)     Hyperlipidemia     Osteoporosis     Polyp of colon     Renal mass, right 11/18/2022    asbestiosis    Review of patient's past surgical history indicates:     Past Surgical History:   Procedure Laterality Date    APPENDECTOMY      BREAST SURGERY      COLONOSCOPY N/A 5/2/2019    COLONOSCOPY POLYPECTOMY SNARE/COLD BIOPSY performed by Danii Fairchild MD at [address] Right 3/21/15    macular \"hole\"    HAND SURGERY      IR KYPHOPLASTY LUMBAR FIRST LEVEL  3/4/2021    IR KYPHOPLASTY LUMBAR FIRST LEVEL 3/4/2021 [facility] SPECIAL PROCEDURES    MASTECTOMY, BILATERAL Bilateral 1985    ORIF DISTAL RADIUS FRACTURE Right 9/10/2019    RIGHT DISTAL RADIUS OPEN REDUCTION INTERNAL FIXATION -[facility] performed by Renato Christopher MD at hospitals                                                   Current Outpatient Medications   Medication Sig Dispense Refill    valACYclovir (VALTREX) 1 g tablet Take 2 tablets by mouth 2 times daily 4 tablet 0    primidone (MYSOLINE) 50 MG tablet Take 1 tablet in the morning, 1 tablet at noon and 2 tablets at night 360 tablet 1    albuterol sulfate  (90 Base) MCG/ACT inhaler Inhale 2 puffs into the lungs every 6 hours as needed for Wheezing 1 each 3    alendronate (FOSAMAX) 70 MG tablet Take 1 tablet by mouth every 7 days 12 tablet 3    atorvastatin (LIPITOR) 20 MG tablet Take 1 tablet by mouth daily (Patient taking differently: Take 20 mg by mouth daily Patient taking 2 tabs) 90 tablet 3    budesonide-formoterol (SYMBICORT) 160-4.5 MCG/ACT AERO Inhale 2 puffs into the lungs 2 times daily 1 each 3    ketoconazole (NIZORAL) 2 % shampoo Apply topically daily as needed. 120 mL 1    SUMAtriptan (IMITREX) 100 MG tablet TAKE 1 TABLET BY MOUTH FOR 1 DOSE AS NEEDED FOR MIGRAINE 9 tablet 5    aspirin 81 MG EC tablet Take 81 mg by mouth daily      HYDROcodone-acetaminophen (NORCO) 5-325 MG per tablet Take 1 tablet by mouth every 6 hours as needed for Pain. Cholecalciferol (VITAMIN D PO) Take by mouth      Ascorbic Acid (VITAMIN C PO) Take by mouth       No current facility-administered medications for this visit. Allergies   Allergen Reactions    Vicodin [Hydrocodone-Acetaminophen] Shortness Of Breath    Molds & Smuts Other (See Comments)     congestion    Montelukast Sodium Other (See Comments)     Vision issues    Pollen Extract Other (See Comments)     congestion       Social History     Tobacco Use    Smoking status: Former     Packs/day: 1.00     Years: 30.00     Pack years: 30.00     Types: Cigarettes     Quit date: 3/1/2010     Years since quittin.8    Smokeless tobacco: Never    Tobacco comments:     H.O.smoking 1 p.p.d x 30+ yrs / Quit 3/1/2010   Vaping Use    Vaping Use: Former    Quit date: 3/30/2022   Substance Use Topics    Alcohol use: Yes     Alcohol/week: 1.0 standard drink     Types: 1 Cans of beer per week     Comment: 1 beer/mo.     Drug use: Never        Family History   Problem Relation Age of Onset    Heart Disease Mother         Review Of Systems    Skin: no abnormal pigmentation, rash, scaling, itching, masses, hair or nail changes  Eyes: negative  Ears/Nose/Throat: negative  Respiratory: negative  Cardiovascular: negative  Gastrointestinal: negative  Genitourinary: negative  Musculoskeletal: negative  Neurologic: negative  Psychiatric: negative  Hematologic/Lymphatic/Immunologic: negative  Endocrine: negative    PHYSICAL EXAMINATION:  /80   Pulse 78   Ht 5' 2\" (1.575 m)   Wt 130 lb (59 kg)   SpO2 96%   BMI 23.78 kg/m²   General appearance - healthy, alert, no distress  Skin - Skin color, texture, turgor normal. No rashes or lesions. Head - Normocephalic. No masses, lesions, tenderness or abnormalities  Eyes - conjunctivae/corneas clear. PERRL, EOM's intact. Ears - External ears normal. Canals clear. TM's normal. Hearing grossly intact to finger rub. Nose/Sinuses - Nares normal. Septum midline. Mucosa normal. No drainage or sinus tenderness. Oropharynx - Lips, mucosa, and tongue normal. Teeth and gums normal. Orop  Neck - Neck supple. No adenopathy. Thyroid symmetric, normal size. No bruits. Back - Back symmetric, no curvature. ROM normal. No CVA tenderness. Lungs - Percussion normal. Good diaphragmatic excursion. Lungs clear, mod decrease bs  Heart - Regular rate and rhythm, with no rub, murmur or gallop noted. Abdomen - Abdomen soft, non-tender. BS normal. No masses, organomegaly  Extremities - Extremities normal. No deformities, edema, or skin discoloration  Musculoskeletal - Spine ROM normal. Muscular strength intact. Peripheral pulses - radial=4/4, dorsalis pedis=4/4,  Neuro - Gait normal. Reflexes normal and symmetric. Sensation grossly normal.  No focal weakness    Lab Results   Component Value Date/Time     12/27/2022 03:56 PM    K 4.3 12/27/2022 03:56 PM     12/27/2022 03:56 PM    CO2 27 12/27/2022 03:56 PM    BUN 9 12/27/2022 03:56 PM    CREATININE 0.5 12/27/2022 03:56 PM    GLUCOSE 96 12/27/2022 03:56 PM    CALCIUM 9.1 12/27/2022 03:56 PM       Lab Results   Component Value Date    WBC 7.9 12/27/2022    HGB 14.1 12/27/2022    HCT 43.2 12/27/2022    MCV 98.3 12/27/2022     12/27/2022      ASSESSMENT:  Encounter Diagnoses   Name Primary?     Bilateral malignant neoplasm of breast in female, unspecified estrogen receptor status, unspecified site of breast ([address] Yes    Other emphysema ([address]- stable off inhalers     Pre-op chest exam     Abnormal CT scan, pelvis     Renal mass, right     Hyperlipidemia, unspecified hyperlipidemia type     Other osteoporosis without current pathological fracture     Intention tremor       Per encounter diagnoses  She is medically cleared for surgery and anesthesia. If preop labs okay  Plan: Will stop nsaids and vit fo 7 day  Per operating surgeon. See also orders filed with this encounter, if any.    Pelvic u/s ( ct shoewd vaginal thickening)

## 2023-01-06 ENCOUNTER — TELEPHONE (OUTPATIENT)
Dept: FAMILY MEDICINE CLINIC | Age: 73
End: 2023-01-06

## 2023-01-06 NOTE — TELEPHONE ENCOUNTER
Pt said she would like a call back from . Pt said they had to move back her surgery date since the date she had her preop done wasn't within a close enough timeframe. Told pt zuleyma was out of office and won't return until Tuesday.      Pt was insisting on  wanting to speak with zuleyma

## 2023-01-16 NOTE — PROGRESS NOTES
Name_______________________________________Printed:____________________  Date and time of surgery_2/7/2023____1100___________________Arrival Time:_____0900___________   1. The instructions given regarding when and if a patient needs to stop oral intake prior to surgery varies. Follow the specific instructions you were given                  _x__Nothing to eat or to drink after Midnight the night before.                   ____Carbo loading or ERAS instructions will be given to select patients-if you have been given those instructions -please do the following                           The evening before your surgery after dinner before midnight drink 40 ounces of gatorade. If you are diabetic use sugar free. The morning of surgery drink 40 ounces of water. This needs to be finished 3 hours prior to your surgery start time. 2. Take the following pills with a small sip of water on the morning of surgery___primidone________________________________________________                  Do not take blood pressure medications ending in pril or sartan the john prior to surgery or the morning of surgery. Dr Ro Paul patient are not to take any medications the AM of surgery. 3. Aspirin, Ibuprofen, Advil, Naproxen, Vitamin E and other Anti-inflammatory products and supplements should be stopped for 5 -7days before surgery or as directed by your physician. 4. Check with your Doctor regarding stopping Plavix, Coumadin,Eliquis, Lovenox,Effient,Pradaxa,Xarelto, Fragmin or other blood thinners and follow their instructions. 5. Do not smoke, and do not drink any alcoholic beverages 24 hours prior to surgery. This includes NA Beer. Refrain from the usage of any recreational drugs. 6. You may brush your teeth and gargle the morning of surgery. DO NOT SWALLOW WATER   7. You MUST make arrangements for a responsible adult to stay on site while you are here and take you home after your surgery.  You will not be allowed to leave alone or drive yourself home. It is strongly suggested someone stay with you the first 24 hrs. Your surgery will be cancelled if you do not have a ride home. 8. A parent/legal guardian must accompany a child scheduled for surgery and plan to stay at the hospital until the child is discharged. Please do not bring other children with you. 9. Please wear simple, loose fitting clothing to the hospital.  Mehrdad Oliver not bring valuables (money, credit cards, checkbooks, etc.) Do not wear any makeup (including no eye makeup) or nail polish on your fingers or toes. 10. DO NOT wear any jewelry or piercings on day of surgery. All body piercing jewelry must be removed. 11. If you have ___dentures, they will be removed before going to the OR; we will provide you a container. If you wear ___contact lenses or ___glasses, they will be removed; please bring a case for them. 12. Please see your family doctor/pediatrician for a history & physical and/or concerning medications. Bring any test results/reports from your physician's office. PCP__________________Phone___________H&P Appt. Date________             13 If you  have a Living Will and Durable Power of  for Healthcare, please bring in a copy. 15. Notify your Surgeon if you develop any illness between now and surgery  time, cough, cold, fever, sore throat, nausea, vomiting, etc.  Please notify your surgeon if you experience dizziness, shortness of breath or blurred vision between now & the time of your surgery             15. DO NOT shave your operative site 96 hours prior to surgery. For face & neck surgery, men may use an electric razor 48 hours prior to surgery. 16. Shower the night before or morning of surgery using an antibacterial soap or as you have been instructed. 17. To provide excellent care visitors will be limited to one in the room at any given time.              18.  Please bring picture ID and insurance card. 19.  Visit our web site for additional information:  [email or web address]              20.During flu season no children under the age of 15 are permitted in the hospital for the safety of all patients. 21. If you take a long acting insulin in the evening only  take half of your usual  dose the night  before your procedure              22. If you use a c-pap please bring DOS if staying overnight,             23.For your convenience [facility] has a pharmacy on site to fill your prescriptions. 24. If you use oxygen and have a portable tank please bring it  with you the DOS             25. Bring a complete list of all your medications with name and dose include any supplements. 26. Other__________________________________________   *Please call pre admission testing if you any further questions   [facility]   [address]    [place] [address]. [facility]  [phone]   [address] [place] [address]       VISITOR POLICY(subject to change)    Current policy is 2 visitors per patient. No children. Mask is  at the discretion of the facility. Visiting hours are 8a-8p. Overnight visitors will be at the discretion of the nurse. All policies subject to change. All above information reviewed with patient in person or by phone. Patient verbalizes understanding. All questions and concerns addressed.                                                                                                  Patient/Rep___patient_________________                                                                                                                                    PRE OP INSTRUCTIONS

## 2023-01-19 ENCOUNTER — HOSPITAL ENCOUNTER (OUTPATIENT)
Age: 73
Discharge: HOME | End: 2023-01-19
Payer: MEDICARE

## 2023-01-19 ENCOUNTER — HOSPITAL ENCOUNTER (OUTPATIENT)
Dept: GENERAL RADIOLOGY | Age: 73
Discharge: HOME | End: 2023-01-19
Payer: MEDICARE

## 2023-01-19 DIAGNOSIS — Z01.811 PRE-OP CHEST EXAM: ICD-10-CM

## 2023-01-19 DIAGNOSIS — Z01.818 PREOP TESTING: ICD-10-CM

## 2023-01-19 LAB
A/G RATIO: 1.3 (ref 1.1–2.2)
ABO/RH: NORMAL
ALBUMIN SERPL-MCNC: 3.9 G/DL (ref 3.4–5)
ALP BLD-CCNC: 99 U/L (ref 40–129)
ALT SERPL-CCNC: 11 U/L (ref 10–40)
ANION GAP SERPL CALCULATED.3IONS-SCNC: 10 MMOL/L (ref 3–16)
ANTIBODY SCREEN: NORMAL
APTT: 29 SEC (ref 23–34.3)
AST SERPL-CCNC: 15 U/L (ref 15–37)
BILIRUB SERPL-MCNC: <0.2 MG/DL (ref 0–1)
BUN BLDV-MCNC: 4 MG/DL (ref 7–20)
CALCIUM SERPL-MCNC: 9.5 MG/DL (ref 8.3–10.6)
CHLORIDE BLD-SCNC: 102 MMOL/L (ref 99–110)
CO2: 24 MMOL/L (ref 21–32)
CREAT SERPL-MCNC: 0.6 MG/DL (ref 0.6–1.2)
EKG ATRIAL RATE: 67 BPM
EKG DIAGNOSIS: NORMAL
EKG P AXIS: 57 DEGREES
EKG P-R INTERVAL: 166 MS
EKG Q-T INTERVAL: 408 MS
EKG QRS DURATION: 88 MS
EKG QTC CALCULATION (BAZETT): 431 MS
EKG R AXIS: -11 DEGREES
EKG T AXIS: 36 DEGREES
EKG VENTRICULAR RATE: 67 BPM
GFR SERPL CREATININE-BSD FRML MDRD: >60 ML/MIN/{1.73_M2}
GLUCOSE BLD-MCNC: 89 MG/DL (ref 70–99)
HCT VFR BLD CALC: 42.9 % (ref 36–48)
HEMOGLOBIN: 14.2 G/DL (ref 12–16)
INR BLD: 0.99 (ref 0.87–1.14)
MCH RBC QN AUTO: 32.8 PG (ref 26–34)
MCHC RBC AUTO-ENTMCNC: 33.2 G/DL (ref 31–36)
MCV RBC AUTO: 98.8 FL (ref 80–100)
PDW BLD-RTO: 12.5 % (ref 12.4–15.4)
PLATELET # BLD: 230 K/UL (ref 135–450)
PMV BLD AUTO: 9.5 FL (ref 5–10.5)
POTASSIUM SERPL-SCNC: 4.4 MMOL/L (ref 3.5–5.1)
PROTHROMBIN TIME: 13 SEC (ref 11.7–14.5)
RBC # BLD: 4.34 M/UL (ref 4–5.2)
SODIUM BLD-SCNC: 136 MMOL/L (ref 136–145)
TOTAL PROTEIN: 6.8 G/DL (ref 6.4–8.2)
WBC # BLD: 5.4 K/UL (ref 4–11)

## 2023-01-19 PROCEDURE — 85730 THROMBOPLASTIN TIME PARTIAL: CPT

## 2023-01-19 PROCEDURE — 85610 PROTHROMBIN TIME: CPT

## 2023-01-19 PROCEDURE — 86850 RBC ANTIBODY SCREEN: CPT

## 2023-01-19 PROCEDURE — 36415 COLL VENOUS BLD VENIPUNCTURE: CPT

## 2023-01-19 PROCEDURE — 86901 BLOOD TYPING SEROLOGIC RH(D): CPT

## 2023-01-19 PROCEDURE — 85027 COMPLETE CBC AUTOMATED: CPT

## 2023-01-19 PROCEDURE — 80053 COMPREHEN METABOLIC PANEL: CPT

## 2023-01-19 PROCEDURE — 71046 X-RAY EXAM CHEST 2 VIEWS: CPT

## 2023-01-19 PROCEDURE — 86900 BLOOD TYPING SEROLOGIC ABO: CPT

## 2023-01-19 PROCEDURE — 93005 ELECTROCARDIOGRAM TRACING: CPT | Performed by: UROLOGY

## 2023-01-25 ENCOUNTER — OFFICE VISIT (OUTPATIENT)
Dept: FAMILY MEDICINE CLINIC | Age: 73
End: 2023-01-25
Payer: MEDICARE

## 2023-01-25 VITALS
DIASTOLIC BLOOD PRESSURE: 78 MMHG | OXYGEN SATURATION: 96 % | HEART RATE: 91 BPM | SYSTOLIC BLOOD PRESSURE: 118 MMHG | HEIGHT: 62 IN | BODY MASS INDEX: 23.55 KG/M2 | WEIGHT: 128 LBS

## 2023-01-25 DIAGNOSIS — C50.912 BILATERAL MALIGNANT NEOPLASM OF BREAST IN FEMALE, UNSPECIFIED ESTROGEN RECEPTOR STATUS, UNSPECIFIED SITE OF BREAST (HCC): Primary | ICD-10-CM

## 2023-01-25 DIAGNOSIS — K21.9 GASTROESOPHAGEAL REFLUX DISEASE WITHOUT ESOPHAGITIS: ICD-10-CM

## 2023-01-25 DIAGNOSIS — J43.9 PULMONARY EMPHYSEMA, UNSPECIFIED EMPHYSEMA TYPE (HCC): ICD-10-CM

## 2023-01-25 DIAGNOSIS — M81.8 OTHER OSTEOPOROSIS WITHOUT CURRENT PATHOLOGICAL FRACTURE: ICD-10-CM

## 2023-01-25 DIAGNOSIS — C50.911 BILATERAL MALIGNANT NEOPLASM OF BREAST IN FEMALE, UNSPECIFIED ESTROGEN RECEPTOR STATUS, UNSPECIFIED SITE OF BREAST (HCC): Primary | ICD-10-CM

## 2023-01-25 PROCEDURE — G8399 PT W/DXA RESULTS DOCUMENT: HCPCS | Performed by: FAMILY MEDICINE

## 2023-01-25 PROCEDURE — 1123F ACP DISCUSS/DSCN MKR DOCD: CPT | Performed by: FAMILY MEDICINE

## 2023-01-25 PROCEDURE — 1036F TOBACCO NON-USER: CPT | Performed by: FAMILY MEDICINE

## 2023-01-25 PROCEDURE — G8427 DOCREV CUR MEDS BY ELIG CLIN: HCPCS | Performed by: FAMILY MEDICINE

## 2023-01-25 PROCEDURE — 3017F COLORECTAL CA SCREEN DOC REV: CPT | Performed by: FAMILY MEDICINE

## 2023-01-25 PROCEDURE — 3023F SPIROM DOC REV: CPT | Performed by: FAMILY MEDICINE

## 2023-01-25 PROCEDURE — 99214 OFFICE O/P EST MOD 30 MIN: CPT | Performed by: FAMILY MEDICINE

## 2023-01-25 PROCEDURE — G8484 FLU IMMUNIZE NO ADMIN: HCPCS | Performed by: FAMILY MEDICINE

## 2023-01-25 PROCEDURE — 1090F PRES/ABSN URINE INCON ASSESS: CPT | Performed by: FAMILY MEDICINE

## 2023-01-25 PROCEDURE — G8420 CALC BMI NORM PARAMETERS: HCPCS | Performed by: FAMILY MEDICINE

## 2023-01-25 NOTE — PROGRESS NOTES
Chief Complaint:     Jacek Bran is a 74 y.o. female who presents for a preoperative physical examination. She is scheduled to have  right nephrectomy done by Dr. Staci Aguilar at [facility] on 2-7-23.     History of Present Illness:      Renal mass    Past Medical History:   Diagnosis Date    Acne     Bilateral malignant neoplasm of breast in female, unspecified estrogen receptor status, unspecified site of breast ([facility] [address] 1/4/2023    Breast cancer ([facility] [address]     Cellulitis     COPD (chronic obstructive pulmonary disease) (HCC)     COPD (chronic obstructive pulmonary disease) ([facility] [address] 4/25/2011    Essential tremor     GERD (gastroesophageal reflux disease)     Hyperlipidemia     Osteoporosis     Polyp of colon     Renal mass, right 11/18/2022        Review of patient's past surgical history indicates:     Past Surgical History:   Procedure Laterality Date    APPENDECTOMY      BREAST SURGERY      COLONOSCOPY N/A 5/2/2019    COLONOSCOPY POLYPECTOMY SNARE/COLD BIOPSY performed by Daxa Damico MD at [address] Right 3/21/15    macular \"hole\"    HAND SURGERY      IR KYPHOPLASTY LUMBAR FIRST LEVEL  3/4/2021    IR KYPHOPLASTY LUMBAR FIRST LEVEL 3/4/2021 [facility] SPECIAL PROCEDURES    MASTECTOMY, BILATERAL Bilateral 1985    ORIF DISTAL RADIUS FRACTURE Right 9/10/2019    RIGHT DISTAL RADIUS OPEN REDUCTION INTERNAL FIXATION -[facility] performed by Monica Vazquez MD at [address]                                                   Current Outpatient Medications   Medication Sig Dispense Refill    primidone (MYSOLINE) 50 MG tablet Take 1 tablet in the morning, 1 tablet at noon and 2 tablets at night 360 tablet 1    alendronate (FOSAMAX) 70 MG tablet Take 1 tablet by mouth every 7 days 12 tablet 3    atorvastatin (LIPITOR) 20 MG tablet Take 1 tablet by mouth daily (Patient taking differently: Take 20 mg by mouth daily Patient taking 2 tabs) 90 tablet 3    SUMAtriptan (IMITREX) 100 MG tablet TAKE 1 TABLET BY MOUTH FOR 1 DOSE AS NEEDED FOR MIGRAINE 9 tablet 5    aspirin 81 MG EC tablet Take 81 mg by mouth daily      Cholecalciferol (VITAMIN D PO) Take by mouth      Ascorbic Acid (VITAMIN C PO) Take by mouth       No current facility-administered medications for this visit. Allergies   Allergen Reactions    Vicodin [Hydrocodone-Acetaminophen] Shortness Of Breath    Molds & Smuts Other (See Comments)     congestion    Montelukast Sodium Other (See Comments)     Vision issues    Pollen Extract Other (See Comments)     congestion       Social History     Tobacco Use    Smoking status: Former     Packs/day: 1.00     Years: 30.00     Pack years: 30.00     Types: Cigarettes     Quit date: 3/1/2010     Years since quittin.9    Smokeless tobacco: Never    Tobacco comments:     H.O.smoking 1 p.p.d x 30+ yrs / Quit 3/1/2010   Vaping Use    Vaping Use: Former    Quit date: 3/30/2022   Substance Use Topics    Alcohol use: Yes     Alcohol/week: 1.0 standard drink     Types: 1 Cans of beer per week     Comment: 1 beer/mo. Drug use: Never        Family History   Problem Relation Age of Onset    Heart Disease Mother         Review Of Systems    Skin: no abnormal pigmentation, rash, scaling, itching, masses, hair or nail changes  Eyes: negative  Ears/Nose/Throat: negative  Respiratory: negative  Cardiovascular: negative  Gastrointestinal: negative  Genitourinary: negative  Musculoskeletal: negative  Neurologic: negative  Psychiatric: negative  Hematologic/Lymphatic/Immunologic: negative  Endocrine: negative    PHYSICAL EXAMINATION:  /78   Pulse 91   Ht 5' 2\" (1.575 m)   Wt 128 lb (58.1 kg)   SpO2 96%   BMI 23.41 kg/m²   General appearance - healthy, alert, no distress  Skin - Skin color, texture, turgor normal. No rashes or lesions. Head - Normocephalic. No masses, lesions, tenderness or abnormalities  Eyes - conjunctivae/corneas clear. PERRL, EOM's intact.   Ears - External ears normal. Canals clear. TM's normal. Hearing grossly intact to finger rub. Nose/Sinuses - Nares normal. Septum midline. Mucosa normal. No drainage or sinus tenderness. Oropharynx - Lips, mucosa, and tongue normal. Teeth and gums normal. Orop  Neck - Neck supple. No adenopathy. Thyroid symmetric, normal size. No bruits. Back - Back symmetric, no curvature. ROM normal. No CVA tenderness. Lungs - Percussion normal. Good diaphragmatic excursion. Lungs clear  Heart - Regular rate and rhythm, with no rub, murmur or gallop noted. Abdomen - Abdomen soft, non-tender. BS normal. No masses, organomegaly  Extremities - Extremities normal. No deformities, edema, or skin discoloration  Musculoskeletal - Spine ROM normal. Muscular strength intact. Peripheral pulses - radial=4/4, dorsalis pedis=4/4,  Neuro - Gait normal. Reflexes normal and symmetric. Sensation grossly normal.  No focal weakness  EKG - normal  All preop labs ere normal  ASSESSMENT:  Right renal mass  Encounter Diagnoses   Name Primary? Bilateral malignant neoplasm of breast in female, unspecified estrogen receptor status, unspecified site of breast ([address] Yes    Pulmonary emphysema, unspecified emphysema type ([address]     Other osteoporosis without current pathological fracture     Gastroesophageal reflux disease without esophagitis       Per encounter diagnoses  She is medically cleared for surgery and anesthesia. Plan:    Per operating surgeon. See also orders filed with this encounter, if any.

## 2023-02-07 ENCOUNTER — ANESTHESIA EVENT (OUTPATIENT)
Dept: OPERATING ROOM | Age: 73
End: 2023-02-07
Payer: MEDICARE

## 2023-02-07 ENCOUNTER — HOSPITAL ENCOUNTER (INPATIENT)
Age: 73
LOS: 2 days | Discharge: HOME | End: 2023-02-09
Attending: UROLOGY | Admitting: UROLOGY
Payer: MEDICARE

## 2023-02-07 ENCOUNTER — ANESTHESIA (OUTPATIENT)
Dept: OPERATING ROOM | Age: 73
End: 2023-02-07
Payer: MEDICARE

## 2023-02-07 DIAGNOSIS — Z01.818 PREOP TESTING: Primary | ICD-10-CM

## 2023-02-07 DIAGNOSIS — N28.89 RIGHT RENAL MASS: ICD-10-CM

## 2023-02-07 DIAGNOSIS — Z01.811 PRE-OP CHEST EXAM: ICD-10-CM

## 2023-02-07 LAB
ABO/RH: NORMAL
ANTIBODY SCREEN: NORMAL

## 2023-02-07 PROCEDURE — 2720000010 HC SURG SUPPLY STERILE: Performed by: UROLOGY

## 2023-02-07 PROCEDURE — 3600000019 HC SURGERY ROBOT ADDTL 15MIN: Performed by: UROLOGY

## 2023-02-07 PROCEDURE — 2580000003 HC RX 258: Performed by: UROLOGY

## 2023-02-07 PROCEDURE — C1889 IMPLANT/INSERT DEVICE, NOC: HCPCS | Performed by: UROLOGY

## 2023-02-07 PROCEDURE — 88307 TISSUE EXAM BY PATHOLOGIST: CPT

## 2023-02-07 PROCEDURE — 2709999900 HC NON-CHARGEABLE SUPPLY: Performed by: UROLOGY

## 2023-02-07 PROCEDURE — 2500000003 HC RX 250 WO HCPCS: Performed by: NURSE ANESTHETIST, CERTIFIED REGISTERED

## 2023-02-07 PROCEDURE — 7100000000 HC PACU RECOVERY - FIRST 15 MIN: Performed by: UROLOGY

## 2023-02-07 PROCEDURE — 86900 BLOOD TYPING SEROLOGIC ABO: CPT

## 2023-02-07 PROCEDURE — 6360000002 HC RX W HCPCS: Performed by: NURSE ANESTHETIST, CERTIFIED REGISTERED

## 2023-02-07 PROCEDURE — A4217 STERILE WATER/SALINE, 500 ML: HCPCS | Performed by: UROLOGY

## 2023-02-07 PROCEDURE — 6370000000 HC RX 637 (ALT 250 FOR IP): Performed by: UROLOGY

## 2023-02-07 PROCEDURE — 88342 IMHCHEM/IMCYTCHM 1ST ANTB: CPT

## 2023-02-07 PROCEDURE — 36415 COLL VENOUS BLD VENIPUNCTURE: CPT

## 2023-02-07 PROCEDURE — 0TT04ZZ RESECTION OF RIGHT KIDNEY, PERCUTANEOUS ENDOSCOPIC APPROACH: ICD-10-PCS | Performed by: UROLOGY

## 2023-02-07 PROCEDURE — 6360000002 HC RX W HCPCS: Performed by: UROLOGY

## 2023-02-07 PROCEDURE — 2580000003 HC RX 258: Performed by: NURSE ANESTHETIST, CERTIFIED REGISTERED

## 2023-02-07 PROCEDURE — 3700000001 HC ADD 15 MINUTES (ANESTHESIA): Performed by: UROLOGY

## 2023-02-07 PROCEDURE — 86850 RBC ANTIBODY SCREEN: CPT

## 2023-02-07 PROCEDURE — 8E0W4CZ ROBOTIC ASSISTED PROCEDURE OF TRUNK REGION, PERCUTANEOUS ENDOSCOPIC APPROACH: ICD-10-PCS | Performed by: UROLOGY

## 2023-02-07 PROCEDURE — 3600000009 HC SURGERY ROBOT BASE: Performed by: UROLOGY

## 2023-02-07 PROCEDURE — 86901 BLOOD TYPING SEROLOGIC RH(D): CPT

## 2023-02-07 PROCEDURE — 1200000000 HC SEMI PRIVATE

## 2023-02-07 PROCEDURE — P9047 ALBUMIN (HUMAN), 25%, 50ML: HCPCS | Performed by: NURSE ANESTHETIST, CERTIFIED REGISTERED

## 2023-02-07 PROCEDURE — 3700000000 HC ANESTHESIA ATTENDED CARE: Performed by: UROLOGY

## 2023-02-07 PROCEDURE — 7100000001 HC PACU RECOVERY - ADDTL 15 MIN: Performed by: UROLOGY

## 2023-02-07 PROCEDURE — S2900 ROBOTIC SURGICAL SYSTEM: HCPCS | Performed by: UROLOGY

## 2023-02-07 PROCEDURE — 6360000002 HC RX W HCPCS: Performed by: ANESTHESIOLOGY

## 2023-02-07 DEVICE — CLIP SURG L8MM M L POLYGLY ACID POLYGLY LAP ABSRB RAD: Type: IMPLANTABLE DEVICE | Status: FUNCTIONAL

## 2023-02-07 DEVICE — CLIP INT M L POLYMER LOK LIG HEM O LOK: Type: IMPLANTABLE DEVICE | Status: FUNCTIONAL

## 2023-02-07 RX ORDER — SODIUM CHLORIDE 0.9 % (FLUSH) 0.9 %
5-40 SYRINGE (ML) INJECTION EVERY 12 HOURS SCHEDULED
Status: DISCONTINUED | OUTPATIENT
Start: 2023-02-07 | End: 2023-02-09 | Stop reason: HOSPADM

## 2023-02-07 RX ORDER — GLYCOPYRROLATE 0.2 MG/ML
INJECTION INTRAMUSCULAR; INTRAVENOUS PRN
Status: DISCONTINUED | OUTPATIENT
Start: 2023-02-07 | End: 2023-02-07 | Stop reason: SDUPTHER

## 2023-02-07 RX ORDER — SODIUM CHLORIDE 9 MG/ML
INJECTION, SOLUTION INTRAVENOUS PRN
Status: DISCONTINUED | OUTPATIENT
Start: 2023-02-07 | End: 2023-02-09 | Stop reason: HOSPADM

## 2023-02-07 RX ORDER — ONDANSETRON 4 MG/1
4 TABLET, ORALLY DISINTEGRATING ORAL EVERY 8 HOURS PRN
Status: DISCONTINUED | OUTPATIENT
Start: 2023-02-07 | End: 2023-02-09 | Stop reason: HOSPADM

## 2023-02-07 RX ORDER — OXYCODONE HYDROCHLORIDE 5 MG/1
5 TABLET ORAL EVERY 4 HOURS PRN
Status: DISCONTINUED | OUTPATIENT
Start: 2023-02-07 | End: 2023-02-09 | Stop reason: HOSPADM

## 2023-02-07 RX ORDER — SUMATRIPTAN 25 MG/1
100 TABLET, FILM COATED ORAL EVERY 8 HOURS PRN
Status: DISCONTINUED | OUTPATIENT
Start: 2023-02-07 | End: 2023-02-09 | Stop reason: HOSPADM

## 2023-02-07 RX ORDER — ATORVASTATIN CALCIUM 20 MG/1
20 TABLET, FILM COATED ORAL DAILY
Status: DISCONTINUED | OUTPATIENT
Start: 2023-02-07 | End: 2023-02-09 | Stop reason: HOSPADM

## 2023-02-07 RX ORDER — FENTANYL CITRATE 50 UG/ML
25 INJECTION, SOLUTION INTRAMUSCULAR; INTRAVENOUS EVERY 5 MIN PRN
Status: DISCONTINUED | OUTPATIENT
Start: 2023-02-07 | End: 2023-02-07 | Stop reason: HOSPADM

## 2023-02-07 RX ORDER — PROPOFOL 10 MG/ML
INJECTION, EMULSION INTRAVENOUS PRN
Status: DISCONTINUED | OUTPATIENT
Start: 2023-02-07 | End: 2023-02-07 | Stop reason: SDUPTHER

## 2023-02-07 RX ORDER — OXYCODONE HYDROCHLORIDE AND ACETAMINOPHEN 5; 325 MG/1; MG/1
1 TABLET ORAL EVERY 6 HOURS PRN
Qty: 12 TABLET | Refills: 0 | Status: SHIPPED | OUTPATIENT
Start: 2023-02-07 | End: 2023-02-10

## 2023-02-07 RX ORDER — LABETALOL HYDROCHLORIDE 5 MG/ML
10 INJECTION, SOLUTION INTRAVENOUS
Status: DISCONTINUED | OUTPATIENT
Start: 2023-02-07 | End: 2023-02-07 | Stop reason: HOSPADM

## 2023-02-07 RX ORDER — SODIUM CHLORIDE 0.9 % (FLUSH) 0.9 %
5-40 SYRINGE (ML) INJECTION PRN
Status: DISCONTINUED | OUTPATIENT
Start: 2023-02-07 | End: 2023-02-07 | Stop reason: HOSPADM

## 2023-02-07 RX ORDER — DEXMEDETOMIDINE HYDROCHLORIDE 100 UG/ML
INJECTION, SOLUTION INTRAVENOUS PRN
Status: DISCONTINUED | OUTPATIENT
Start: 2023-02-07 | End: 2023-02-07 | Stop reason: SDUPTHER

## 2023-02-07 RX ORDER — MORPHINE SULFATE 4 MG/ML
4 INJECTION, SOLUTION INTRAMUSCULAR; INTRAVENOUS
Status: DISCONTINUED | OUTPATIENT
Start: 2023-02-07 | End: 2023-02-09 | Stop reason: HOSPADM

## 2023-02-07 RX ORDER — AMOXICILLIN 250 MG
1 CAPSULE ORAL 2 TIMES DAILY
Qty: 50 TABLET | Refills: 0 | Status: SHIPPED | OUTPATIENT
Start: 2023-02-07

## 2023-02-07 RX ORDER — SODIUM CHLORIDE 0.9 % (FLUSH) 0.9 %
5-40 SYRINGE (ML) INJECTION PRN
Status: DISCONTINUED | OUTPATIENT
Start: 2023-02-07 | End: 2023-02-09 | Stop reason: HOSPADM

## 2023-02-07 RX ORDER — ACETAMINOPHEN 325 MG/1
650 TABLET ORAL EVERY 6 HOURS
Status: DISCONTINUED | OUTPATIENT
Start: 2023-02-07 | End: 2023-02-09 | Stop reason: HOSPADM

## 2023-02-07 RX ORDER — MAGNESIUM HYDROXIDE 1200 MG/15ML
LIQUID ORAL CONTINUOUS PRN
Status: COMPLETED | OUTPATIENT
Start: 2023-02-07 | End: 2023-02-07

## 2023-02-07 RX ORDER — SODIUM CHLORIDE, SODIUM LACTATE, POTASSIUM CHLORIDE, CALCIUM CHLORIDE 600; 310; 30; 20 MG/100ML; MG/100ML; MG/100ML; MG/100ML
INJECTION, SOLUTION INTRAVENOUS CONTINUOUS PRN
Status: DISCONTINUED | OUTPATIENT
Start: 2023-02-07 | End: 2023-02-07 | Stop reason: SDUPTHER

## 2023-02-07 RX ORDER — SENNA AND DOCUSATE SODIUM 50; 8.6 MG/1; MG/1
1 TABLET, FILM COATED ORAL 2 TIMES DAILY
Status: DISCONTINUED | OUTPATIENT
Start: 2023-02-07 | End: 2023-02-09 | Stop reason: HOSPADM

## 2023-02-07 RX ORDER — OXYCODONE HYDROCHLORIDE 5 MG/1
10 TABLET ORAL EVERY 4 HOURS PRN
Status: DISCONTINUED | OUTPATIENT
Start: 2023-02-07 | End: 2023-02-09 | Stop reason: HOSPADM

## 2023-02-07 RX ORDER — ROCURONIUM BROMIDE 10 MG/ML
INJECTION, SOLUTION INTRAVENOUS PRN
Status: DISCONTINUED | OUTPATIENT
Start: 2023-02-07 | End: 2023-02-07 | Stop reason: SDUPTHER

## 2023-02-07 RX ORDER — HYDRALAZINE HYDROCHLORIDE 20 MG/ML
10 INJECTION INTRAMUSCULAR; INTRAVENOUS
Status: DISCONTINUED | OUTPATIENT
Start: 2023-02-07 | End: 2023-02-07 | Stop reason: HOSPADM

## 2023-02-07 RX ORDER — HYDROMORPHONE HCL 110MG/55ML
0.25 PATIENT CONTROLLED ANALGESIA SYRINGE INTRAVENOUS EVERY 5 MIN PRN
Status: DISCONTINUED | OUTPATIENT
Start: 2023-02-07 | End: 2023-02-07 | Stop reason: HOSPADM

## 2023-02-07 RX ORDER — LIDOCAINE HYDROCHLORIDE 20 MG/ML
INJECTION, SOLUTION EPIDURAL; INFILTRATION; INTRACAUDAL; PERINEURAL PRN
Status: DISCONTINUED | OUTPATIENT
Start: 2023-02-07 | End: 2023-02-07 | Stop reason: SDUPTHER

## 2023-02-07 RX ORDER — ONDANSETRON 2 MG/ML
INJECTION INTRAMUSCULAR; INTRAVENOUS PRN
Status: DISCONTINUED | OUTPATIENT
Start: 2023-02-07 | End: 2023-02-07 | Stop reason: SDUPTHER

## 2023-02-07 RX ORDER — SODIUM CHLORIDE 9 MG/ML
INJECTION, SOLUTION INTRAVENOUS CONTINUOUS
Status: ACTIVE | OUTPATIENT
Start: 2023-02-07 | End: 2023-02-08

## 2023-02-07 RX ORDER — SODIUM CHLORIDE, SODIUM LACTATE, POTASSIUM CHLORIDE, CALCIUM CHLORIDE 600; 310; 30; 20 MG/100ML; MG/100ML; MG/100ML; MG/100ML
INJECTION, SOLUTION INTRAVENOUS CONTINUOUS
Status: DISCONTINUED | OUTPATIENT
Start: 2023-02-07 | End: 2023-02-07 | Stop reason: HOSPADM

## 2023-02-07 RX ORDER — PRIMIDONE 50 MG/1
50 TABLET ORAL 2 TIMES DAILY
Status: DISCONTINUED | OUTPATIENT
Start: 2023-02-07 | End: 2023-02-09 | Stop reason: HOSPADM

## 2023-02-07 RX ORDER — SODIUM CHLORIDE 0.9 % (FLUSH) 0.9 %
5-40 SYRINGE (ML) INJECTION EVERY 12 HOURS SCHEDULED
Status: DISCONTINUED | OUTPATIENT
Start: 2023-02-07 | End: 2023-02-07 | Stop reason: HOSPADM

## 2023-02-07 RX ORDER — ONDANSETRON 2 MG/ML
4 INJECTION INTRAMUSCULAR; INTRAVENOUS
Status: DISCONTINUED | OUTPATIENT
Start: 2023-02-07 | End: 2023-02-07 | Stop reason: HOSPADM

## 2023-02-07 RX ORDER — LIDOCAINE HYDROCHLORIDE 10 MG/ML
1 INJECTION, SOLUTION EPIDURAL; INFILTRATION; INTRACAUDAL; PERINEURAL
Status: ACTIVE | OUTPATIENT
Start: 2023-02-07 | End: 2023-02-08

## 2023-02-07 RX ORDER — OXYCODONE HYDROCHLORIDE 5 MG/1
5 TABLET ORAL
Status: DISCONTINUED | OUTPATIENT
Start: 2023-02-07 | End: 2023-02-07 | Stop reason: HOSPADM

## 2023-02-07 RX ORDER — MORPHINE SULFATE 2 MG/ML
2 INJECTION, SOLUTION INTRAMUSCULAR; INTRAVENOUS
Status: DISCONTINUED | OUTPATIENT
Start: 2023-02-07 | End: 2023-02-09 | Stop reason: HOSPADM

## 2023-02-07 RX ORDER — FENTANYL CITRATE 50 UG/ML
INJECTION, SOLUTION INTRAMUSCULAR; INTRAVENOUS PRN
Status: DISCONTINUED | OUTPATIENT
Start: 2023-02-07 | End: 2023-02-07 | Stop reason: SDUPTHER

## 2023-02-07 RX ORDER — ALBUMIN (HUMAN) 12.5 G/50ML
SOLUTION INTRAVENOUS PRN
Status: DISCONTINUED | OUTPATIENT
Start: 2023-02-07 | End: 2023-02-07 | Stop reason: SDUPTHER

## 2023-02-07 RX ORDER — PROCHLORPERAZINE EDISYLATE 5 MG/ML
5 INJECTION INTRAMUSCULAR; INTRAVENOUS
Status: DISCONTINUED | OUTPATIENT
Start: 2023-02-07 | End: 2023-02-07 | Stop reason: HOSPADM

## 2023-02-07 RX ORDER — POLYETHYLENE GLYCOL 3350 17 G/17G
17 POWDER, FOR SOLUTION ORAL DAILY PRN
Status: DISCONTINUED | OUTPATIENT
Start: 2023-02-07 | End: 2023-02-09 | Stop reason: HOSPADM

## 2023-02-07 RX ORDER — ONDANSETRON 2 MG/ML
4 INJECTION INTRAMUSCULAR; INTRAVENOUS EVERY 6 HOURS PRN
Status: DISCONTINUED | OUTPATIENT
Start: 2023-02-07 | End: 2023-02-09 | Stop reason: HOSPADM

## 2023-02-07 RX ORDER — SODIUM CHLORIDE 9 MG/ML
25 INJECTION, SOLUTION INTRAVENOUS PRN
Status: DISCONTINUED | OUTPATIENT
Start: 2023-02-07 | End: 2023-02-07 | Stop reason: HOSPADM

## 2023-02-07 RX ORDER — DEXAMETHASONE SODIUM PHOSPHATE 4 MG/ML
INJECTION, SOLUTION INTRA-ARTICULAR; INTRALESIONAL; INTRAMUSCULAR; INTRAVENOUS; SOFT TISSUE PRN
Status: DISCONTINUED | OUTPATIENT
Start: 2023-02-07 | End: 2023-02-07 | Stop reason: SDUPTHER

## 2023-02-07 RX ORDER — BUPIVACAINE HYDROCHLORIDE AND EPINEPHRINE 5; 5 MG/ML; UG/ML
INJECTION, SOLUTION PERINEURAL
Status: COMPLETED | OUTPATIENT
Start: 2023-02-07 | End: 2023-02-07

## 2023-02-07 RX ORDER — LIDOCAINE HYDROCHLORIDE 10 MG/ML
0.5 INJECTION, SOLUTION EPIDURAL; INFILTRATION; INTRACAUDAL; PERINEURAL ONCE
Status: DISCONTINUED | OUTPATIENT
Start: 2023-02-07 | End: 2023-02-07 | Stop reason: HOSPADM

## 2023-02-07 RX ADMIN — ROCURONIUM BROMIDE 50 MG: 10 INJECTION, SOLUTION INTRAVENOUS at 12:35

## 2023-02-07 RX ADMIN — CEFAZOLIN 2000 MG: 2 INJECTION, POWDER, FOR SOLUTION INTRAMUSCULAR; INTRAVENOUS at 11:50

## 2023-02-07 RX ADMIN — ALBUMIN (HUMAN) 12.5 G: 0.25 INJECTION, SOLUTION INTRAVENOUS at 12:34

## 2023-02-07 RX ADMIN — SODIUM CHLORIDE, POTASSIUM CHLORIDE, SODIUM LACTATE AND CALCIUM CHLORIDE: 600; 310; 30; 20 INJECTION, SOLUTION INTRAVENOUS at 11:53

## 2023-02-07 RX ADMIN — PROPOFOL 100 MG: 10 INJECTION, EMULSION INTRAVENOUS at 12:05

## 2023-02-07 RX ADMIN — GLYCOPYRROLATE 0.5 MG: 0.2 INJECTION INTRAMUSCULAR; INTRAVENOUS at 12:29

## 2023-02-07 RX ADMIN — LIDOCAINE HYDROCHLORIDE 100 MG: 20 INJECTION, SOLUTION EPIDURAL; INFILTRATION; INTRACAUDAL; PERINEURAL at 12:05

## 2023-02-07 RX ADMIN — FENTANYL CITRATE 100 MCG: 50 INJECTION, SOLUTION INTRAMUSCULAR; INTRAVENOUS at 12:48

## 2023-02-07 RX ADMIN — SODIUM CHLORIDE, POTASSIUM CHLORIDE, SODIUM LACTATE AND CALCIUM CHLORIDE: 600; 310; 30; 20 INJECTION, SOLUTION INTRAVENOUS at 09:54

## 2023-02-07 RX ADMIN — SODIUM CHLORIDE: 9 INJECTION, SOLUTION INTRAVENOUS at 19:53

## 2023-02-07 RX ADMIN — ACETAMINOPHEN 650 MG: 325 TABLET ORAL at 20:25

## 2023-02-07 RX ADMIN — ONDANSETRON 8 MG: 2 INJECTION INTRAMUSCULAR; INTRAVENOUS at 12:20

## 2023-02-07 RX ADMIN — OXYCODONE HYDROCHLORIDE 5 MG: 5 TABLET ORAL at 20:24

## 2023-02-07 RX ADMIN — SUGAMMADEX 200 MG: 100 INJECTION, SOLUTION INTRAVENOUS at 15:41

## 2023-02-07 RX ADMIN — DEXMEDETOMIDINE HYDROCHLORIDE 10 MCG: 100 INJECTION, SOLUTION INTRAVENOUS at 11:52

## 2023-02-07 RX ADMIN — ALBUMIN (HUMAN) 12.5 G: 0.25 INJECTION, SOLUTION INTRAVENOUS at 12:30

## 2023-02-07 RX ADMIN — DEXAMETHASONE SODIUM PHOSPHATE 10 MG: 4 INJECTION, SOLUTION INTRAMUSCULAR; INTRAVENOUS at 12:20

## 2023-02-07 RX ADMIN — ROCURONIUM BROMIDE 50 MG: 10 INJECTION, SOLUTION INTRAVENOUS at 12:05

## 2023-02-07 RX ADMIN — STANDARDIZED SENNA CONCENTRATE AND DOCUSATE SODIUM 1 TABLET: 8.6; 5 TABLET ORAL at 20:25

## 2023-02-07 RX ADMIN — HYDROMORPHONE HYDROCHLORIDE 0.25 MG: 2 INJECTION, SOLUTION INTRAMUSCULAR; INTRAVENOUS; SUBCUTANEOUS at 17:21

## 2023-02-07 RX ADMIN — HYDROMORPHONE HYDROCHLORIDE 0.25 MG: 2 INJECTION, SOLUTION INTRAMUSCULAR; INTRAVENOUS; SUBCUTANEOUS at 16:49

## 2023-02-07 RX ADMIN — DEXMEDETOMIDINE HYDROCHLORIDE 10 MCG: 100 INJECTION, SOLUTION INTRAVENOUS at 11:54

## 2023-02-07 RX ADMIN — FENTANYL CITRATE 100 MCG: 50 INJECTION, SOLUTION INTRAMUSCULAR; INTRAVENOUS at 12:00

## 2023-02-07 RX ADMIN — HYDROMORPHONE HYDROCHLORIDE 0.25 MG: 2 INJECTION, SOLUTION INTRAMUSCULAR; INTRAVENOUS; SUBCUTANEOUS at 16:36

## 2023-02-07 ASSESSMENT — PAIN SCALES - GENERAL
PAINLEVEL_OUTOF10: 7
PAINLEVEL_OUTOF10: 9

## 2023-02-07 ASSESSMENT — PAIN DESCRIPTION - LOCATION
LOCATION: ABDOMEN

## 2023-02-07 ASSESSMENT — PAIN DESCRIPTION - DESCRIPTORS
DESCRIPTORS: ACHING;DISCOMFORT;SORE
DESCRIPTORS: DISCOMFORT
DESCRIPTORS: SORE

## 2023-02-07 ASSESSMENT — PAIN - FUNCTIONAL ASSESSMENT: PAIN_FUNCTIONAL_ASSESSMENT: NONE - DENIES PAIN

## 2023-02-07 ASSESSMENT — ENCOUNTER SYMPTOMS: SHORTNESS OF BREATH: 0

## 2023-02-07 NOTE — OP NOTE
Urology Operative Report  [facility]    Provider: Dillon Perdue MD Patient ID:  Admission Date: 2023 Name: Raul Wagner Date: 2023  MRN: [id number]   Patient Location: OR/NONE : 1950  Attending: Dillon Perdue MD Date of Service: 2023  PCP: Mukesh Mathew MD     Date of Operation: 2023     Preoperative Diagnosis: renal mass on the RIGHT upper pole ~7cm    Postoperative Diagnosis: same    Procedure:    1. Robotic assisted laparoscopic radical nephrectomy on the RIGHT  2. Intraoperative ultrasound requiring less than 30 minutes of physician time including intraoperative interpretation, with Doppler blood flow  3. Layered closure of the anterior abdominal wall    Surgeon:   Dillon Perdue MD    Anesthesia: General endotracheal anesthesia    Indications: Landen Hudson is a 74 y.o. female who presents for the above named surgery. Informed consent was obtained and the risks, benefits, and details of the procedure were explained to the patient who elected to proceed. Details of Procedure:  After informed consent was obtained, making the patient aware of the risks, benefits and alternatives to the procedure, she was taken back to the surgical suite and positioned in a supine position on the surgical table. SCDs were placed on the lower extremities. General anesthesia was induced, A Raymond catheter was placed, and she was transferred to a left lateral decubitus position. All pressure points were carefully padded. A 8mm incision was made in the right side paramedian location, inferior and lateral to the umbilicus. A veress needle was introduced into the abdomen in one attempt and insufflation was obtained. A 8mm trocar was used to enter the abdomen. Adhesions were seen in the right lower quadrant, possibly related to appendectomy (?)  There was no injury was noted.  After inspecting the abdomen sufficiently we placed 2 robotic 8 mm trocars in the right lower quadrant and one 8mm trocar in the right upper quadrant, triangulated towards the renal hilum. An assistant 8mm port was placed in the midline supra-umbilically. A 5mm assistant port was placed in the midline below the xiphoid process to assist with liver retraction. A bullet clamped was placed through this port, used to elevate the liver edge, and was self-retaining by grasping the diaphragm. The robot was then docked and the instruments advanced under direct vision. The adhesions to the cecum and the ascending colon were taken down and the colon was reflected medially by incising the white line of Toldt. The duodenum was reflected medially allowing exposure to the kidney, the hilar vessels, and the IVC in the usual retroperitoneal location. A plane was created directly above the psoas muscle, elevating the ureter with the kidney packet, and leaving the gonadal vessel with the IVC. This plane posterior to the kidney, on top of the psoas muscle was extended cephalad. The renal hilar vessels were encountered medial to the kidney. There were 2 renal arteries and 3 renal veins. We continued with dissection to free the upper pole of the kidney and leave the adrenal gland in place. The entirety of the kidney was freed from its retroperitoneal location. Despite maximum mobility, we did not have effective visualization of the upper pole for repair of a partial nephrectomy renorrhaphy. At this point I felt confident that I could take the mass off of the kidney, but could not bring the kidney back together. We used the drop-in ultrasound probe to identify the renal vascular anatomy including confirming the renal arteries as well as the subsegmental vasculature feeding this upper pole renal mass. Markings were placed on the parenchyma of the kidney to identify where an incision would occur should a partial nephrectomy happened.   Despite all of this it was decided that a nephrectomy would be the best option. The renal artery and vein were dissected free of investing tissues. A 45 mm vascular staple load was used to take the lower pole renal artery and vein. A second load was used to take the upper pole renal artery and renal vein. All upper pole and lateral kidney attachments were taken down. The ureter was clipped with hem-o-lock clips and cut. The kidney was entirely free and placed into a specimen bag. The wound bed was irrigated and hemostasis was ensured with pneumoperitoneum decreased. Robotic instruments were removed. The 8 mm midline incision was closed directly using a UR 6 needle and a 0 Vicryl suture in an interrupted fashion. The 2 right lateral port incisions were connected which allowed extraction of the kidney via a paramedian incision. This incision was closed using 0 Vicryl on the peritoneum in a running fashion, followed by looped 0 PDS on the fascia, followed by 3-0 Vicryl deep dermal stitches and 4-0 Monocryl on the skin all remaining robotic ports were then removed. 4-0 running Monocryl was used to approximate the skin edges and Dermabond was applied to all wounds. At the end of the procedure all needle, lap, instrument and sponge counts were correct. The patient tolerated the procedure well, was extubated without issue in the operating room, and was transported to the PACU in stable condition. Findings: There did appear to be an adequate surgical margin. The patient had a very large liver and the kidney was located entirely retrohepatic. There was difficulty reflecting the liver medially, difficulty accessing the upper pole of the kidney, difficulty with the right lower quadrant adhesions from prior surgery, and difficulty with a complex renal vasculature. For all of these reasons it was decided to simply perform a nephrectomy which ultimately occurred without issue.     Estimated Blood Loss: Approximately 100mL                  Drains: Raymond catheter    Specimens: Right kidney and proximal ureter    Complications: none apparent           Disposition:  PACU - hemodynamically stable.             Julianne Pedersen MD  2/7/2023

## 2023-02-07 NOTE — ANESTHESIA PRE PROCEDURE
Department of Anesthesiology  Preprocedure Note       Name:  Jenny Garay   Age:  74 y.o.  :  1950                                          MRN:  [id number]         Date:  2023      Surgeon: Kenn Myles):  Katja Foley MD    Procedure: Procedure(s):  ROBOTIC LAPAROSCOPIC PARTIAL RIGHT NEPHRECTOMY    Medications prior to admission:   Prior to Admission medications    Medication Sig Start Date End Date Taking? Authorizing Provider   primidone (MYSOLINE) 50 MG tablet Take 1 tablet in the morning, 1 tablet at noon and 2 tablets at night 3/18/22   Megha Vitale MD   alendronate (FOSAMAX) 70 MG tablet Take 1 tablet by mouth every 7 days 3/18/22   Megha Vitale MD   atorvastatin (LIPITOR) 20 MG tablet Take 1 tablet by mouth daily  Patient taking differently: Take 20 mg by mouth daily Patient taking 2 tabs 3/18/22   Megha Vitale MD   SUMAtriptan (IMITREX) 100 MG tablet TAKE 1 TABLET BY MOUTH FOR 1 DOSE AS NEEDED FOR MIGRAINE 3/18/22   Megha Vitale MD   aspirin 81 MG EC tablet Take 81 mg by mouth daily    Historical Provider, MD   Cholecalciferol (VITAMIN D PO) Take by mouth    Historical Provider, MD   Ascorbic Acid (VITAMIN C PO) Take by mouth    Historical Provider, MD       Current medications:    Current Facility-Administered Medications   Medication Dose Route Frequency Provider Last Rate Last Admin    ceFAZolin (ANCEF) 2,000 mg in sodium chloride 0.9 % 50 mL IVPB (mini-bag)  2,000 mg IntraVENous On Call to [address] [place] [address] MD Kvng        lactated ringers IV soln infusion   IntraVENous Continuous Katja Foley MD        lidocaine PF 1 % injection 0.5 mL  0.5 mL IntraDERmal Once Katja Foley MD           Allergies:     Allergies   Allergen Reactions    Vicodin [Hydrocodone-Acetaminophen] Shortness Of Breath    Molds & Smuts Other (See Comments)     congestion    Montelukast Sodium Other (See Comments)     Vision issues    Pollen Extract Other (See Comments)     congestion       Problem List:    Patient Active Problem List   Diagnosis Code    Hyperlipidemia E78.5    Polyp of colon K63.5    COPD (chronic obstructive pulmonary disease) ([address] J44.9    Pleural plaque with presence of asbestos J92.0    GERD (gastroesophageal reflux disease) K21.9    Hip mass R22.40    Osteoporosis M81.0    Intention tremor G25.2    Constipation K59.00    Closed fracture of right distal radius S52.501A    Cataract, nuclear sclerotic senile, left H25.12    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    Lens replaced by other means Z96.1    Epiretinal membrane H35.379    Vitreomacular adhesion, left eye H43.822    Vitreous degeneration, left eye H43.812    Current occasional smoker Z72.0    Renal mass, right N28.89    Bilateral malignant neoplasm of breast in female, unspecified estrogen receptor status, unspecified site of breast ([address] C50.911, C50.912       Past Medical History:        Diagnosis Date    Acne     Bilateral malignant neoplasm of breast in female, unspecified estrogen receptor status, unspecified site of breast ([address] 1/4/2023    Breast cancer ([address]     Cellulitis     COPD (chronic obstructive pulmonary disease) ([address]     COPD (chronic obstructive pulmonary disease) ([address] 4/25/2011    Essential tremor     GERD (gastroesophageal reflux disease)     Hyperlipidemia     Osteoporosis     Polyp of colon     Renal mass, right 11/18/2022       Past Surgical History:        Procedure Laterality Date    APPENDECTOMY      BREAST SURGERY      COLONOSCOPY N/A 5/2/2019    COLONOSCOPY POLYPECTOMY SNARE/COLD BIOPSY performed by Maximilian Mcgill MD at [facility] Right 3/21/15    macular \"hole\"    HAND SURGERY      IR KYPHOPLASTY LUMBAR FIRST LEVEL  3/4/2021    IR KYPHOPLASTY LUMBAR FIRST LEVEL 3/4/2021 [facility] PROCED[facility]    MASTECTOMY, BILATERAL Bilateral 1985  ORIF DISTAL RADIUS FRACTURE Right 9/10/2019    RIGHT DISTAL RADIUS OPEN REDUCTION INTERNAL FIXATION -[facility] performed by Vladimir Tian MD at [address] [place] [address] History:    Social History     Tobacco Use    Smoking status: Former     Packs/day: 1.00     Years: 30.00     Pack years: 30.00     Types: Cigarettes     Quit date: 3/1/2010     Years since quittin.9    Smokeless tobacco: Never    Tobacco comments:     H.O.smoking 1 p.p.d x 30+ yrs / Quit 3/1/2010   Substance Use Topics    Alcohol use: Yes     Alcohol/week: 1.0 standard drink     Types: 1 Cans of beer per week     Comment: 1 beer/mo. Counseling given: Not Answered  Tobacco comments: H.O.smoking 1 p.p.d x 30+ yrs / Quit 3/1/2010      Vital Signs (Current):   Vitals:    23 0952   Weight: 170 lb (77.1 kg)                                              BP Readings from Last 3 Encounters:   23 118/78   23 120/80   22 124/80       NPO Status:                                                                                 BMI:   Wt Readings from Last 3 Encounters:   23 170 lb (77.1 kg)   23 128 lb (58.1 kg)   23 130 lb (59 kg)     Body mass index is 31.09 kg/m².     CBC:   Lab Results   Component Value Date/Time    WBC 5.4 2023 09:58 AM    RBC 4.34 2023 09:58 AM    HGB 14.2 2023 09:58 AM    HCT 42.9 2023 09:58 AM    MCV 98.8 2023 09:58 AM    RDW 12.5 2023 09:58 AM     2023 09:58 AM       CMP:   Lab Results   Component Value Date/Time     2023 09:58 AM    K 4.4 2023 09:58 AM     2023 09:58 AM    CO2 24 2023 09:58 AM    BUN 4 2023 09:58 AM    CREATININE 0.6 2023 09:58 AM    GFRAA >60 2022 10:24 AM    GFRAA >60 2012 10:43 AM    AGRATIO 1.3 2023 09:58 AM    LABGLOM >60 2023 09:58 AM    GLUCOSE 89 2023 09:58 AM    PROT 6.8 2023 09:58 AM    PROT 7.5 09/06/2012 10:43 AM    CALCIUM 9.5 01/19/2023 09:58 AM    BILITOT <0.2 01/19/2023 09:58 AM    ALKPHOS 99 01/19/2023 09:58 AM    AST 15 01/19/2023 09:58 AM    ALT 11 01/19/2023 09:58 AM       POC Tests: No results for input(s): POCGLU, POCNA, POCK, POCCL, POCBUN, POCHEMO, POCHCT in the last 72 hours. Coags:   Lab Results   Component Value Date/Time    PROTIME 13.0 01/19/2023 09:58 AM    INR 0.99 01/19/2023 09:58 AM    APTT 29.0 01/19/2023 09:58 AM       HCG (If Applicable): No results found for: PREGTESTUR, PREGSERUM, HCG, HCGQUANT     ABGs: No results found for: PHART, PO2ART, CSM8HFF, AZS1EOQ, BEART, C2DBNMWG     Type & Screen (If Applicable):  No results found for: LABABO, LABRH    Drug/Infectious Status (If Applicable):  No results found for: HIV, HEPCAB    COVID-19 Screening (If Applicable): No results found for: COVID19        Anesthesia Evaluation  Patient summary reviewed and Nursing notes reviewed no history of anesthetic complications:   Airway: Mallampati: I  TM distance: >3 FB   Neck ROM: full  Mouth opening: > = 3 FB   Dental: normal exam         Pulmonary:   (+) COPD:      (-) asthma and shortness of breath                           Cardiovascular:        (-) hypertension and  angina                Neuro/Psych:      (-) CVA           GI/Hepatic/Renal:   (+) GERD:,      (-) liver disease       Endo/Other:    (+) malignancy/cancer. (-) diabetes mellitus, hypothyroidism               Abdominal:             Vascular:     - PVD. Other Findings:           Anesthesia Plan      general     ASA 3       Induction: intravenous. MIPS: Postoperative opioids intended and Prophylactic antiemetics administered. Anesthetic plan and risks discussed with patient. Use of blood products discussed with patient whom. Plan discussed with CRNA.                     Lorie Melton MD   2/7/2023

## 2023-02-07 NOTE — PROGRESS NOTES
Pt to pacu from OR, oral airway in place. VSS, O2 sats 100% on 6L simple mask. Abdominal incisions with glue approximated.   Will monitor No complaints

## 2023-02-07 NOTE — ANESTHESIA POSTPROCEDURE EVALUATION
Department of Anesthesiology  Postprocedure Note    Patient: Papa   MRN: [id number]  YOB: 1950  Date of evaluation: 2023      Procedure Summary     Date: 23 Room / Location: [address]    Anesthesia Start: 6281 Anesthesia Stop: 782    Procedure: ROBOTIC LAPAROSCOPIC  RIGHT NEPHRECTOMY (Right: Abdomen) Diagnosis:       Right renal mass      (N28.89 RIGHT RENAL MASS)    Surgeons: Felipe Raymond MD Responsible Provider: Edgar Villanueva MD    Anesthesia Type: General ASA Status: 3          Anesthesia Type: General    Noa Phase I: Noa Score: 8    Noa Phase II:        Anesthesia Post Evaluation    Patient location during evaluation: PACU  Patient participation: complete - patient participated  Level of consciousness: awake and alert  Airway patency: patent  Nausea & Vomiting: no nausea and no vomiting  Complications: no  Cardiovascular status: hemodynamically stable  Respiratory status: acceptable  Hydration status: stable  Multimodal analgesia pain management approach

## 2023-02-07 NOTE — DISCHARGE SUMMARY
Urology Discharge Summary  [facility]    Provider: Jonathan Macdonald MD Patient ID:  Admission Date: 2023 Name: Enedina Steele Date: 2023 MRN: [id number]   Patient Location: OR/NONE : 1950  Attending: Jonathan Macdonald MD Date of Service: 2023  PCP: Jose Huang MD     Discharge date: 2023    Discharge Diagnoses:   Right-sided renal mass    Reason for Hospitalization: Jesus Chiang is a 74 y.o. female who was admitted post surgery. Operations/Procedures Performed:   1.  Robotic assisted laparoscopic right-sided radical nephrectomy    Discharge Condition:  Good    Hospital Course: The patient was admitted on 2023 and underwent the above mentioned procedure(s). She tolerated the procedure well with no apparent complications. Please see full operative report for further details regarding the operation. Postoperatively the patient remained in stable condition. Pain was controlled post-operatively with oral and IV medication. Diet was advanced and this was tolerated well. At the time of discharge, the patient was tolerating oral food and hydration, was ambulating without difficulty, and pain was controlled on oral medications. The patient was determined to be suitable for discharge and the patient felt comfortable with that decision. Patient was discharged in good condition. Consults: none     Significant Diagnostic findings: Labs, pathology is pending    Discharge Exam:  Blood pressure 137/87, pulse 83, temperature 97.4 °F (36.3 °C), temperature source Temporal, resp. rate 16, weight 126 lb (57.2 kg), SpO2 100 %, not currently breastfeeding.   Gen - NAD, AOx3  CV - RRR  Resp - CTAB  Abd - soft, NT/ND, inc c/d/i  Ext - warm, well-perfused    Disposition: Discharged home in good condition    Discharge Medications:  Current Discharge Medication List        START taking these medications    Details   senna-docusate (PERICOLACE) 8.6-50 MG per tablet Take 1 tablet by mouth 2 times daily  Qty: 50 tablet, Refills: 0      oxyCODONE-acetaminophen (PERCOCET) 5-325 MG per tablet Take 1 tablet by mouth every 6 hours as needed for Pain for up to 3 days. Intended supply: 3 days. Take lowest dose possible to manage pain Max Daily Amount: 4 tablets  Qty: 12 tablet, Refills: 0    Comments: Reduce doses taken as pain becomes manageable  Associated Diagnoses: Right renal mass           CONTINUE these medications which have NOT CHANGED    Details   primidone (MYSOLINE) 50 MG tablet Take 1 tablet in the morning, 1 tablet at noon and 2 tablets at night  Qty: 360 tablet, Refills: 1    Associated Diagnoses: Benign essential tremor      alendronate (FOSAMAX) 70 MG tablet Take 1 tablet by mouth every 7 days  Qty: 12 tablet, Refills: 3    Comments: **Patient requests 90 days supply**  Associated Diagnoses: Other osteoporosis without current pathological fracture      atorvastatin (LIPITOR) 20 MG tablet Take 1 tablet by mouth daily  Qty: 90 tablet, Refills: 3    Comments: **Patient requests 90 days supply**  Associated Diagnoses: Mixed hyperlipidemia      SUMAtriptan (IMITREX) 100 MG tablet TAKE 1 TABLET BY MOUTH FOR 1 DOSE AS NEEDED FOR MIGRAINE  Qty: 9 tablet, Refills: 5    Associated Diagnoses: Migraine without status migrainosus, not intractable, unspecified migraine type      aspirin 81 MG EC tablet Take 81 mg by mouth daily      Cholecalciferol (VITAMIN D PO) Take by mouth      Ascorbic Acid (VITAMIN C PO) Take by mouth             Follow up Appointment:  Please call [phone] to schedule a follow up appointment with Dr. Tamra Barron in 1-2 weeks.       Delaney Leone MD  2/7/2023

## 2023-02-07 NOTE — H&P
Urology History and Physical  Inpatient Setting - [facility]    Provider: Ted Melgar MD  Patient ID:  Admission Date: 2023 Name: Ginette Salmon  OR Date: n/a MRN: [id number]   Patient Location: OR/NONE : 1950  Attending: Ted Melgar MD Date of Service: 2023  PCP: Cindy Steve MD     Diagnoses:  RIGHT sided renal mass    Assessment/Plan:  Continue to the OR as planned for RIGHT sided partial nephrectomy. We also discussed a possible radical nephrectomy if there are unexpected findings or complications during the procedure. All the patients questions were answered in detail. She understands the plan as listed above.                                                                                                                                               _____________________________________________________________    CC: Pre-op    HPI: Ginette Salmon is a 74 y.o. female. The history and physical exam was reviewed. The patient was seen and examined in pre-op. She had a chance to ask questions which were answered. There has been no interval changes. Plan to continue to the OR    Past Medical History:   She has a past medical history of Acne, Bilateral malignant neoplasm of breast in female, unspecified estrogen receptor status, unspecified site of breast ([facility] [address] (2023), Breast cancer ([facility] [address] Cellulitis, COPD (chronic obstructive pulmonary disease) ([address] COPD (chronic obstructive pulmonary disease) ([address] (2011), Essential tremor, GERD (gastroesophageal reflux disease), Hyperlipidemia, Osteoporosis, Polyp of colon, and Renal mass, right (2022). Past Surgical History:  She has a past surgical history that includes Appendectomy; Dilation and curettage of uterus; Breast surgery; Hand surgery; eye surgery (Right, 3/21/15); Mastectomy, bilateral (Bilateral, );  Colonoscopy (N/A, 2019); ORIF DISTAL RADIUS FRACTURE (Right, 9/10/2019); and IR KYPHOPLASTY LUMBAR 1 VERTEBRAL BODY (3/4/2021). Allergies: Allergies   Allergen Reactions    Vicodin [Hydrocodone-Acetaminophen] Shortness Of Breath     Patient states that she was given too much pain medication during her ED visit which caused her to have shortness of breath and dizziness    Molds & Smuts Other (See Comments)     congestion    Montelukast Sodium Other (See Comments)     Vision issues    Pollen Extract Other (See Comments)     congestion       Social History:  She reports that she quit smoking about 12 years ago. Her smoking use included cigarettes. She has a 30.00 pack-year smoking history. She has never used smokeless tobacco. She reports current alcohol use of about 1.0 standard drink per week. She reports that she does not use drugs. Family History:  family history includes Heart Disease in her mother. Medications:   Scheduled Meds:   ceFAZolin  2,000 mg IntraVENous On Call to OR    lidocaine PF  0.5 mL IntraDERmal Once     Continuous Infusions:   lactated ringers IV soln 50 mL/hr at 02/07/23 0954     PRN Meds:    Review of Systems:  Constitutional: Negative for fever    Genitourinary: see HPI  Eyes: negative for sudden change in vision  EENT: no complaints  Cardiovascular: Negative for chest pain  Respiratory: Negative for shortness of breath  Gastrointestinal: Negative for nausea  Musculoskeletal: Negative for back pain   Neurological: Negative for weakness  Psychiatric: Negative for anxiety  Integumentary: Negative for rashes or adenopathy     Physical Exam:  Vitals:    02/07/23 0958   BP: 137/87   Pulse: 83   Resp: 16   Temp: 97.4 °F (36.3 °C)   SpO2: 100%     Constitutional: NAD, well-developed, well-nourished. Cardiovascular: Regular rate. No peripheral edema  Respiratory: Respirations are even and non-labored. No audible breath sounds. Psychiatric: A + O x 3, normal affect. Insight appears intact.      Jonathan Macdonald MD  2/7/2023

## 2023-02-07 NOTE — PROGRESS NOTES
Phase I discharge criteria met, VSS, O2 sats 96% on 2L NC. Abdominal incisions approximated. Raymond catheter draining clear yellow urine. Pt reports pain is tolerable.   Pt holding in pacu awaiting bed availability on 4T

## 2023-02-08 LAB
ANION GAP SERPL CALCULATED.3IONS-SCNC: 10 MMOL/L (ref 3–16)
BUN BLDV-MCNC: 8 MG/DL (ref 7–20)
CALCIUM SERPL-MCNC: 8.6 MG/DL (ref 8.3–10.6)
CHLORIDE BLD-SCNC: 108 MMOL/L (ref 99–110)
CO2: 22 MMOL/L (ref 21–32)
CREAT SERPL-MCNC: 0.8 MG/DL (ref 0.6–1.2)
EKG ATRIAL RATE: 59 BPM
EKG DIAGNOSIS: NORMAL
EKG P AXIS: 29 DEGREES
EKG P-R INTERVAL: 148 MS
EKG Q-T INTERVAL: 352 MS
EKG QRS DURATION: 86 MS
EKG QTC CALCULATION (BAZETT): 348 MS
EKG R AXIS: -15 DEGREES
EKG T AXIS: 12 DEGREES
EKG VENTRICULAR RATE: 59 BPM
GFR SERPL CREATININE-BSD FRML MDRD: >60 ML/MIN/{1.73_M2}
GLUCOSE BLD-MCNC: 79 MG/DL (ref 70–99)
HCT VFR BLD CALC: 36.2 % (ref 36–48)
HEMOGLOBIN: 12 G/DL (ref 12–16)
POTASSIUM SERPL-SCNC: 3.8 MMOL/L (ref 3.5–5.1)
SODIUM BLD-SCNC: 140 MMOL/L (ref 136–145)
TROPONIN: <0.01 NG/ML

## 2023-02-08 PROCEDURE — 36415 COLL VENOUS BLD VENIPUNCTURE: CPT

## 2023-02-08 PROCEDURE — 85018 HEMOGLOBIN: CPT

## 2023-02-08 PROCEDURE — 99223 1ST HOSP IP/OBS HIGH 75: CPT | Performed by: INTERNAL MEDICINE

## 2023-02-08 PROCEDURE — 1200000000 HC SEMI PRIVATE

## 2023-02-08 PROCEDURE — 84443 ASSAY THYROID STIM HORMONE: CPT

## 2023-02-08 PROCEDURE — 93005 ELECTROCARDIOGRAM TRACING: CPT | Performed by: INTERNAL MEDICINE

## 2023-02-08 PROCEDURE — 51798 US URINE CAPACITY MEASURE: CPT

## 2023-02-08 PROCEDURE — 6360000002 HC RX W HCPCS: Performed by: UROLOGY

## 2023-02-08 PROCEDURE — 2580000003 HC RX 258: Performed by: UROLOGY

## 2023-02-08 PROCEDURE — 93010 ELECTROCARDIOGRAM REPORT: CPT | Performed by: INTERNAL MEDICINE

## 2023-02-08 PROCEDURE — 94150 VITAL CAPACITY TEST: CPT

## 2023-02-08 PROCEDURE — 6370000000 HC RX 637 (ALT 250 FOR IP): Performed by: UROLOGY

## 2023-02-08 PROCEDURE — 80048 BASIC METABOLIC PNL TOTAL CA: CPT

## 2023-02-08 PROCEDURE — 85014 HEMATOCRIT: CPT

## 2023-02-08 PROCEDURE — 84484 ASSAY OF TROPONIN QUANT: CPT

## 2023-02-08 RX ORDER — ENOXAPARIN SODIUM 100 MG/ML
30 INJECTION SUBCUTANEOUS DAILY
Status: DISCONTINUED | OUTPATIENT
Start: 2023-02-08 | End: 2023-02-09 | Stop reason: HOSPADM

## 2023-02-08 RX ADMIN — ATORVASTATIN CALCIUM 20 MG: 20 TABLET, FILM COATED ORAL at 03:05

## 2023-02-08 RX ADMIN — ONDANSETRON 4 MG: 4 TABLET, ORALLY DISINTEGRATING ORAL at 08:29

## 2023-02-08 RX ADMIN — PRIMIDONE 50 MG: 50 TABLET ORAL at 08:19

## 2023-02-08 RX ADMIN — STANDARDIZED SENNA CONCENTRATE AND DOCUSATE SODIUM 1 TABLET: 8.6; 5 TABLET ORAL at 21:56

## 2023-02-08 RX ADMIN — SUMATRIPTAN SUCCINATE 100 MG: 25 TABLET ORAL at 03:04

## 2023-02-08 RX ADMIN — SUMATRIPTAN SUCCINATE 100 MG: 25 TABLET ORAL at 22:17

## 2023-02-08 RX ADMIN — ACETAMINOPHEN 650 MG: 325 TABLET ORAL at 03:05

## 2023-02-08 RX ADMIN — ACETAMINOPHEN 650 MG: 325 TABLET ORAL at 21:56

## 2023-02-08 RX ADMIN — SODIUM CHLORIDE: 9 INJECTION, SOLUTION INTRAVENOUS at 04:06

## 2023-02-08 RX ADMIN — POLYETHYLENE GLYCOL 3350 17 G: 17 POWDER, FOR SOLUTION ORAL at 21:59

## 2023-02-08 RX ADMIN — PRIMIDONE 50 MG: 50 TABLET ORAL at 21:56

## 2023-02-08 RX ADMIN — ACETAMINOPHEN 650 MG: 325 TABLET ORAL at 08:19

## 2023-02-08 RX ADMIN — OXYCODONE HYDROCHLORIDE 10 MG: 5 TABLET ORAL at 05:10

## 2023-02-08 RX ADMIN — PRIMIDONE 50 MG: 50 TABLET ORAL at 03:05

## 2023-02-08 RX ADMIN — OXYCODONE HYDROCHLORIDE 10 MG: 5 TABLET ORAL at 15:42

## 2023-02-08 RX ADMIN — ATORVASTATIN CALCIUM 20 MG: 20 TABLET, FILM COATED ORAL at 08:19

## 2023-02-08 RX ADMIN — STANDARDIZED SENNA CONCENTRATE AND DOCUSATE SODIUM 1 TABLET: 8.6; 5 TABLET ORAL at 08:19

## 2023-02-08 RX ADMIN — ENOXAPARIN SODIUM 30 MG: 100 INJECTION SUBCUTANEOUS at 14:26

## 2023-02-08 ASSESSMENT — PAIN DESCRIPTION - LOCATION: LOCATION: ABDOMEN

## 2023-02-08 ASSESSMENT — PAIN SCALES - GENERAL
PAINLEVEL_OUTOF10: 8
PAINLEVEL_OUTOF10: 0
PAINLEVEL_OUTOF10: 8
PAINLEVEL_OUTOF10: 0

## 2023-02-08 ASSESSMENT — PAIN DESCRIPTION - DESCRIPTORS: DESCRIPTORS: BURNING;SHARP

## 2023-02-08 ASSESSMENT — PAIN - FUNCTIONAL ASSESSMENT: PAIN_FUNCTIONAL_ASSESSMENT: ACTIVITIES ARE NOT PREVENTED

## 2023-02-08 NOTE — PROGRESS NOTES
Urology Progress Note  [facility]    Provider: Tracy Saunders APRN - CNP  Patient ID:  Admission Date: 2023 Name: Sandy Worthy Date: 2023 MRN: [id number]   Patient Location: [facility]3335/3914-15 : 1950  Attending: Mariluz Myers MD Date of Service: 2023  PCP: Elnoria Lesches, MD     Diagnoses:  1. Preop testing    2. Pre-op chest exam    3. Right renal mass      Assessment/Plan:  POD 1 Right Radical Nephrectomy 2023 per Dr. Wandy Whyte of nausea this morning, abdomen soft, non-distended, incisions CDI, she is not tolerating PO d/t nausea. Bradycardia noted 45bpm this morning, she does report intractable nausea with Zofran. Was seen in March last year for palpitations and chest pain. Recc  Consult medicine for bradycardia  Bradycardia may be from recent abdominal surgery, developing ileus, nausea. Sounds like had a recent cardiac history. Will get Medicine involved. Advance diet as tolerated, currently only on clear d/t nausea. OOB as tolerated. Incentive spirometry nursing. The patient had a chance to ask questions which were answered. she understands the above plan. Subjective:   Bev Greenwood is a 74 y.o. female. She was seen and examined this morning. Today we discussed bradycardia, discussed hopefully discharge tomorrow if workup ok. Objective:   Vitals:  Vitals:    23 0812   BP: 131/71   Pulse: 50   Resp: 18   Temp: 98.6 °F (37 °C)   SpO2: 94%       Intake/Output Summary (Last 24 hours) at 2023 0848  Last data filed at 2023 8460  Gross per 24 hour   Intake 2400 ml   Output 1000 ml   Net 1400 ml     Physical Exam:  Gen: Alert and oriented x3, no acute distress  CV: bradycardia  Resp: unlabored respirations  Abd: Soft, non-distended, non-tender, no masses  Ext: no peripheral edema noted, moves upper and lower extremities spontaneously  Skin: warmand well perfused, no rashes noted on the face, or arms. Labs:  Lab Results   Component Value Date    WBC 5.4 01/19/2023    HGB 12.0 02/08/2023    HCT 36.2 02/08/2023    MCV 98.8 01/19/2023     01/19/2023     Lab Results   Component Value Date    CREATININE 0.8 02/08/2023    BUN 8 02/08/2023     02/08/2023    K 3.8 02/08/2023     02/08/2023    CO2 22 02/08/2023       MOIRA Escobedo - CNP   2/8/2023

## 2023-02-08 NOTE — CARE COORDINATION
Discharge Planning Note:    Chart reviewed and it appears that patient has minimal needs for discharge at this time. Discussed with patient and requested that case management be notified if discharge needs are identified.     - Current discharge plan is for the patient to return home. Case management will continue to follow progress and update discharge plan as needed.       Risk of Readmission Score: 8%    YOCASTA BrooksN RN    [facility]  Phone: [phone]

## 2023-02-08 NOTE — CARE COORDINATION
02/08/23 6216   IMM Letter   IMM Letter given to Patient/Family/Significant other/Guardian/POA/by: Patient   IMM Letter date given: 02/08/23   IMM Letter time given: 0848     Copy given to the patient.     YOCASTA HansonN RN    [facility]  Phone: [phone]

## 2023-02-08 NOTE — PLAN OF CARE
Problem: Skin/Tissue Integrity - Adult  Goal: Skin integrity remains intact  Outcome: Progressing  Goal: Incisions, wounds, or drain sites healing without S/S of infection  Outcome: Progressing     Problem: Gastrointestinal - Adult  Goal: Minimal or absence of nausea and vomiting  Outcome: Progressing  Goal: Maintains or returns to baseline bowel function  Outcome: Progressing

## 2023-02-08 NOTE — CONSULTS
[address] [place] [address]   Electrophysiology   Date: 2023  Reason for Consultation: Bradycardia   Consult Requesting Physician: Piyush Santillan MD     CC: abdominal pain   HPI: Judy Mulligan is a 74 y.o. female admitted for nephrectomy. S/p right sided nephrectomy yesterday. This morning she reports having pain and nauseated. Nursing staff checked her pulse rate and reported bradycardia, with heart rates of 40? bpm and recheck of 50s. No ECG has been done. Patient is not on telemetry. Patient reports some dizziness when stood up. No chest pain. History of COPD/emphysema, right renal mass (? RCC), breast cancer, former smoker, history of CVA. Saw cardiology in office last year and Echo and Holter was ordered. She did Holter but did not do echo. No prior history of cardiac issues. Denies having chest pain, shortness of breath, dyspnea on exertion, Orthopnea, PND at the time of this visit. Assessment:   Bradycardia  Renal mass s/p nephrectomy  History of breast cancer  History of stroke  COPD/emphysema  Hyperlipidemia    Plan:   Bradycardia reported occurred during pain and nausea, likely vagal response. No ECG/Tele/Rhythm strip available. Ordered telemetry. 12 leads ECG   Prior Holter monitoring with no significant arrhythmia. TSH   Echo   Pain control. If echo unremarkable, no further evaluation is recommended. Continue Lipitor   Resume ASA when OK with nephrology. History of stroke on the past.     Discussed with nursing staff. Relevant available labs, and cardiovascular diagnostics, documents reviewed. EC2023 sinus rhythm  Echo: None  Stress test: none  Potassium 3.8  Creatinine 0.8    TSH: 3/18/2022 1.07  CXR: Normal  CT scan: Right renal mass, possible renal cell carcinoma  Outside medical records reviewed and summarized in H&P above.      I independently reviewed relevant and available cardiac diagnostic tests ECG, CXR, Echo, Stress test, Device interrogation, and Holter. Physical Examination:  Vitals:    23 0812   BP: 131/71   Pulse: 50   Resp: 18   Temp: 98.6 °F (37 °C)   SpO2: 94%      In: 2460 [P.O.:60; I.V.:2300]  Out: 1000    Wt Readings from Last 3 Encounters:   23 126 lb (57.2 kg)   23 128 lb (58.1 kg)   23 130 lb (59 kg)     Temp  Av.5 °F (36.9 °C)  Min: 97.8 °F (36.6 °C)  Max: 99.3 °F (37.4 °C)  Pulse  Av  Min: 50  Max: 94  BP  Min: 124/73  Max: 159/88  SpO2  Av %  Min: 94 %  Max: 100 %    Intake/Output Summary (Last 24 hours) at 2023 1141  Last data filed at 2023 4337  Gross per 24 hour   Intake 2460 ml   Output 1000 ml   Net 1460 ml       Constitutional: Oriented. No distress. Cardiovascular: Normal rate, regular rhythm, S1&S2. Pulmonary/Chest: Bilateral respiratory sounds. No rhonchi. Abdominal: Soft. Mild tenderness Incision clean   Musculoskeletal: No edema    Neurological: Alert and oriented. Follows command    C/Nilay Tomlinson 1106 Problems    Diagnosis Date Noted    Renal mass, right [N28.89] 2023     Priority: Medium    Right renal mass [N28.89] 2022     Priority: Medium       Scheduled Meds:   sodium chloride flush  5-40 mL IntraVENous 2 times per day    atorvastatin  20 mg Oral Daily    primidone  50 mg Oral BID    sodium chloride flush  5-40 mL IntraVENous 2 times per day    acetaminophen  650 mg Oral Q6H    sennosides-docusate sodium  1 tablet Oral BID     Continuous Infusions:   sodium chloride      sodium chloride      sodium chloride 100 mL/hr at 23 0406     PRN Meds:.perflutren lipid microspheres, lidocaine 1 % injection, sodium chloride flush, sodium chloride, SUMAtriptan, sodium chloride flush, sodium chloride, ondansetron **OR** ondansetron, oxyCODONE **OR** oxyCODONE, morphine **OR** morphine, polyethylene glycol     Prior to Admission medications    Medication Sig Start Date End Date Taking?  Authorizing Provider   senna-docusate (PERICOLACE) 8.6-50 MG per tablet Take 1 tablet by mouth 2 times daily 2/7/23  Yes Sol Pierson MD   oxyCODONE-acetaminophen (PERCOCET) 5-325 MG per tablet Take 1 tablet by mouth every 6 hours as needed for Pain for up to 3 days. Intended supply: 3 days.  Take lowest dose possible to manage pain Max Daily Amount: 4 tablets 2/7/23 2/10/23 Yes Sol Pierson MD   primidone (MYSOLINE) 50 MG tablet Take 1 tablet in the morning, 1 tablet at noon and 2 tablets at night 3/18/22   Adriel Leroy MD   alendronate (FOSAMAX) 70 MG tablet Take 1 tablet by mouth every 7 days 3/18/22   Adriel Leroy MD   atorvastatin (LIPITOR) 20 MG tablet Take 1 tablet by mouth daily  Patient taking differently: Take 20 mg by mouth daily Patient taking 2 tabs 3/18/22   Adriel Leroy MD   SUMAtriptan (IMITREX) 100 MG tablet TAKE 1 TABLET BY MOUTH FOR 1 DOSE AS NEEDED FOR MIGRAINE 3/18/22   Adriel Leroy MD   aspirin 81 MG EC tablet Take 81 mg by mouth daily    Historical Provider, MD   Cholecalciferol (VITAMIN D PO) Take by mouth  Patient not taking: Reported on 2/7/2023    Historical Provider, MD   Ascorbic Acid (VITAMIN C PO) Take by mouth    Historical Provider, MD       Past Medical History:   Diagnosis Date    Acne     Bilateral malignant neoplasm of breast in female, unspecified estrogen receptor status, unspecified site of breast ([facility] [address] 1/4/2023    Breast cancer ([facility] [address]     Cellulitis     COPD (chronic obstructive pulmonary disease) ([facility] [address]     COPD (chronic obstructive pulmonary disease) ([facility] [address] 4/25/2011    Essential tremor     GERD (gastroesophageal reflux disease)     Hyperlipidemia     Osteoporosis     Polyp of colon     Renal mass, right 11/18/2022        Past Surgical History:   Procedure Laterality Date    APPENDECTOMY      BREAST SURGERY      COLONOSCOPY N/A 05/02/2019    COLONOSCOPY POLYPECTOMY SNARE/COLD BIOPSY performed by Libby Wren MD at [address] [place] [address]  CURETTAGE OF UTERUS      EYE SURGERY Right 03/21/2015    macular \"hole\"    HAND SURGERY      IR KYPHOPLASTY LUMBAR FIRST LEVEL  03/04/2021    IR KYPHOPLASTY LUMBAR FIRST LEVEL 3/4/2021 [facility] SPECIAL PROCEDURES    MASTECTOMY, BILATERAL Bilateral 1985    ORIF DISTAL RADIUS FRACTURE Right 09/10/2019    RIGHT DISTAL RADIUS OPEN REDUCTION INTERNAL FIXATION -STYKER [facility] [address] [facility] Right 2/7/2023    ROBOTIC LAPAROSCOPIC  RIGHT NEPHRECTOMY performed by Theodora Kunz MD at [address] [place] [address] Right 02/07/2023    Dr. Linder Dopvandana   Allergen Reactions    Vicodin [Hydrocodone-Acetaminophen] Shortness Of Breath     Patient states that she was given too much pain medication during her ED visit which caused her to have shortness of breath and dizziness    Molds & Smuts Other (See Comments)     congestion    Montelukast Sodium Other (See Comments)     Vision issues    Pollen Extract Other (See Comments)     congestion        reports that she quit smoking about 12 years ago. Her smoking use included cigarettes. She has a 30.00 pack-year smoking history. She has never used smokeless tobacco. She reports current alcohol use of about 1.0 standard drink per week. She reports that she does not use drugs. All questions and concerns were addressed to the patient/family. Alternatives to my treatment were discussed. I have discussed the above stated plan and the patient verbalized understanding and agreed with the plan. NOTE: This report was transcribed using voice recognition software. Every effort was made to ensure accuracy, however, inadvertent computerized transcription errors may be present.      Theodora Kunz MD, MPH  [address]   Office: [phone]  Fax: [phone]

## 2023-02-08 NOTE — CONSULTS
[address] [place], [place] [address]ISTS CONSULT NOTE    2/8/2023 1:24 PM    Patient Information: Nicola Apley   Date of Admit:  2/7/2023  Primary Care Physician:  Jeremy Mota MD  Requesting Physician:  Katja Foley MD    Reason for consult:   Medical evaluation and recommendations for bradycardia    Chief complaint:  I got my kidney taken out    History of Present Illness:  Nicola Apley is a 74 y.o. female on Katja Foley MD service who was admitted on 2/7/2023 for elective R robotic assisted laparoscopic radical nephrectomy for R renal mass. Patient with history of COPD, h/o breast cancer, GERD. Patient found to have sinus bradycardia with HR in 50s. Says she is dizzy and weak. No CP, SOB, HA or fevers. Has abdominal pain since surgery. No chills, NS. No palpitations or syncope. No change in vision or speech. Otherwise complete ROS is negative unless listed above. REVIEW OF SYSTEMS:   Pertinent positives as noted in HPI. All other systems were reviewed and are negative. Past Medical History:   has a past medical history of Acne, Bilateral malignant neoplasm of breast in female, unspecified estrogen receptor status, unspecified site of breast ([address] Breast cancer ([address] Cellulitis, COPD (chronic obstructive pulmonary disease) ([address] COPD (chronic obstructive pulmonary disease) ([address] Essential tremor, GERD (gastroesophageal reflux disease), Hyperlipidemia, Osteoporosis, Polyp of colon, and Renal mass, right. Past Surgical History:   has a past surgical history that includes Appendectomy; Dilation and curettage of uterus; Breast surgery; Hand surgery; eye surgery (Right, 03/21/2015); Mastectomy, bilateral (Bilateral, 1985); Colonoscopy (N/A, 05/02/2019); ORIF DISTAL RADIUS FRACTURE (Right, 09/10/2019);  IR KYPHOPLASTY LUMBAR 1 VERTEBRAL BODY (03/04/2021); total nephrectomy (Right, 02/07/2023); and partial nephrectomy (Right, 2/7/2023). Medications:   sodium chloride flush  5-40 mL IntraVENous 2 times per day    atorvastatin  20 mg Oral Daily    primidone  50 mg Oral BID    sodium chloride flush  5-40 mL IntraVENous 2 times per day    acetaminophen  650 mg Oral Q6H    sennosides-docusate sodium  1 tablet Oral BID       Allergies: Allergies   Allergen Reactions    Vicodin [Hydrocodone-Acetaminophen] Shortness Of Breath     Patient states that she was given too much pain medication during her ED visit which caused her to have shortness of breath and dizziness    Molds & Smuts Other (See Comments)     congestion    Montelukast Sodium Other (See Comments)     Vision issues    Pollen Extract Other (See Comments)     congestion        Social History:   reports that she quit smoking about 12 years ago. Her smoking use included cigarettes. She has a 30.00 pack-year smoking history. She has never used smokeless tobacco. She reports current alcohol use of about 1.0 standard drink per week. She reports that she does not use drugs. Family History:  family history includes Heart Disease in her mother. Physical Exam:  /67   Pulse 68   Temp 97.8 °F (36.6 °C) (Oral)   Resp 18   Ht 5' 2\" (1.575 m)   Wt 126 lb (57.2 kg)   SpO2 94%   BMI 23.05 kg/m²     General appearance: Appears comfortable. Well nourished  Eyes: Sclera clear, pupils equal  ENT: Moist mucus membranes, no thrush  Neck: Trachea midline, symmetrical  Cardiovascular: Bradycardia. No murmur, gallop, rub. No edema in  lower extremities  Respiratory: Clear to auscultation bilaterally. No wheeze. Good inspiratory effort  Gastrointestinal: Abdomen soft, expected incisional tenderness, not distended, normal bowel sounds  Musculoskeletal: No cyanosis in digits, warm extremities  Neurologic: Cranial nerves grossly intact, no motor or speech deficits. Psychiatric: Normal affect. Alert and oriented to time, place and person.   Skin: Warm, dry, normal turgor, no rash    Labs:  CBC:   Lab Results   Component Value Date/Time    WBC 5.4 01/19/2023 09:58 AM    RBC 4.34 01/19/2023 09:58 AM    HGB 12.0 02/08/2023 04:45 AM    HCT 36.2 02/08/2023 04:45 AM    MCV 98.8 01/19/2023 09:58 AM    MCH 32.8 01/19/2023 09:58 AM    MCHC 33.2 01/19/2023 09:58 AM    RDW 12.5 01/19/2023 09:58 AM     01/19/2023 09:58 AM    MPV 9.5 01/19/2023 09:58 AM     BMP:    Lab Results   Component Value Date/Time     02/08/2023 04:44 AM    K 3.8 02/08/2023 04:44 AM     02/08/2023 04:44 AM    CO2 22 02/08/2023 04:44 AM    BUN 8 02/08/2023 04:44 AM    CREATININE 0.8 02/08/2023 04:44 AM    CALCIUM 8.6 02/08/2023 04:44 AM    GFRAA >60 03/18/2022 10:24 AM    GFRAA >60 09/06/2012 10:43 AM    LABGLOM >60 02/08/2023 04:44 AM    GLUCOSE 79 02/08/2023 04:44 AM         Problem List:    Principal Problem:    Right renal mass  Active Problems:    Renal mass, right  Resolved Problems:    * No resolved hospital problems. *       Assessment & Plan:     Bradycardia  Check TSH  Telemetry to r/o arrhythmias  Check serial troponin to r/o NSTEMI  Check Echo given bradycardia with dizziness  Cardiology consult to consider event monitor   2. R renal mass   - S/P R nephrectomy   - F/U pathology from 2/7/23  3. H/O stroke   - Resume ASA when ok with Urology   - Continue Lipitor    Discussed with patient and nursing. Thank you for the opportunity to participate in the care of your patient.     Nancy Pickens MD   2/8/2023 1:24 PM

## 2023-02-08 NOTE — PROGRESS NOTES
Patient had ROBOTIC LAPAROSCOPIC  RIGHT NEPHRECTOMY done yesterday, alert and oriented X4, VSS. 5 lap sites to abdomen, CDI, an ice pack applied to ABD, orourke out @0600, not voided, ambulated from bed to door, tolerated fairly well. Oxy for pain, denies N/V. All safety measures in place. The care plan and education has been reviewed and mutually agreed upon with the patient.

## 2023-02-08 NOTE — PROGRESS NOTES
Shift assessment completed. Neuro WNL. Denies any pain at this time. Reports nausea this morning. AM meds administered per MAR. The care plan and education has been reviewed and mutually agreed upon with the patient. Standard safety measures in place. 12:53 PM  Patient states she wants to be dc. This RN notified MD at this time.

## 2023-02-09 VITALS
WEIGHT: 126 LBS | OXYGEN SATURATION: 94 % | TEMPERATURE: 98.5 F | SYSTOLIC BLOOD PRESSURE: 147 MMHG | HEIGHT: 62 IN | DIASTOLIC BLOOD PRESSURE: 89 MMHG | HEART RATE: 82 BPM | BODY MASS INDEX: 23.19 KG/M2 | RESPIRATION RATE: 18 BRPM

## 2023-02-09 LAB
ANION GAP SERPL CALCULATED.3IONS-SCNC: 9 MMOL/L (ref 3–16)
BASOPHILS ABSOLUTE: 0 K/UL (ref 0–0.2)
BASOPHILS RELATIVE PERCENT: 0.3 %
BUN BLDV-MCNC: 9 MG/DL (ref 7–20)
CALCIUM SERPL-MCNC: 9 MG/DL (ref 8.3–10.6)
CHLORIDE BLD-SCNC: 110 MMOL/L (ref 99–110)
CO2: 22 MMOL/L (ref 21–32)
CREAT SERPL-MCNC: 1 MG/DL (ref 0.6–1.2)
EOSINOPHILS ABSOLUTE: 0.1 K/UL (ref 0–0.6)
EOSINOPHILS RELATIVE PERCENT: 1.3 %
GFR SERPL CREATININE-BSD FRML MDRD: 59 ML/MIN/{1.73_M2}
GLUCOSE BLD-MCNC: 88 MG/DL (ref 70–99)
HCT VFR BLD CALC: 39.1 % (ref 36–48)
HEMOGLOBIN: 12.9 G/DL (ref 12–16)
LV EF: 55 %
LVEF MODALITY: NORMAL
LYMPHOCYTES ABSOLUTE: 1.5 K/UL (ref 1–5.1)
LYMPHOCYTES RELATIVE PERCENT: 22.9 %
MCH RBC QN AUTO: 31.8 PG (ref 26–34)
MCHC RBC AUTO-ENTMCNC: 32.9 G/DL (ref 31–36)
MCV RBC AUTO: 96.5 FL (ref 80–100)
MONOCYTES ABSOLUTE: 0.3 K/UL (ref 0–1.3)
MONOCYTES RELATIVE PERCENT: 4.7 %
NEUTROPHILS ABSOLUTE: 4.6 K/UL (ref 1.7–7.7)
NEUTROPHILS RELATIVE PERCENT: 70.8 %
PDW BLD-RTO: 12.3 % (ref 12.4–15.4)
PLATELET # BLD: 200 K/UL (ref 135–450)
PMV BLD AUTO: 8 FL (ref 5–10.5)
POTASSIUM REFLEX MAGNESIUM: 3.6 MMOL/L (ref 3.5–5.1)
RBC # BLD: 4.05 M/UL (ref 4–5.2)
SODIUM BLD-SCNC: 141 MMOL/L (ref 136–145)
TSH REFLEX: 0.41 UIU/ML (ref 0.27–4.2)
WBC # BLD: 6.5 K/UL (ref 4–11)

## 2023-02-09 PROCEDURE — C8929 TTE W OR WO FOL WCON,DOPPLER: HCPCS

## 2023-02-09 PROCEDURE — 80048 BASIC METABOLIC PNL TOTAL CA: CPT

## 2023-02-09 PROCEDURE — 99232 SBSQ HOSP IP/OBS MODERATE 35: CPT | Performed by: NURSE PRACTITIONER

## 2023-02-09 PROCEDURE — 85025 COMPLETE CBC W/AUTO DIFF WBC: CPT

## 2023-02-09 PROCEDURE — 6360000004 HC RX CONTRAST MEDICATION: Performed by: INTERNAL MEDICINE

## 2023-02-09 PROCEDURE — 36415 COLL VENOUS BLD VENIPUNCTURE: CPT

## 2023-02-09 PROCEDURE — 6370000000 HC RX 637 (ALT 250 FOR IP): Performed by: UROLOGY

## 2023-02-09 RX ADMIN — ATORVASTATIN CALCIUM 20 MG: 20 TABLET, FILM COATED ORAL at 08:27

## 2023-02-09 RX ADMIN — PERFLUTREN 1.5 ML: 6.52 INJECTION, SUSPENSION INTRAVENOUS at 11:35

## 2023-02-09 RX ADMIN — ACETAMINOPHEN 650 MG: 325 TABLET ORAL at 08:26

## 2023-02-09 RX ADMIN — PRIMIDONE 50 MG: 50 TABLET ORAL at 08:26

## 2023-02-09 RX ADMIN — STANDARDIZED SENNA CONCENTRATE AND DOCUSATE SODIUM 1 TABLET: 8.6; 5 TABLET ORAL at 08:26

## 2023-02-09 ASSESSMENT — PAIN DESCRIPTION - ONSET: ONSET: ON-GOING

## 2023-02-09 ASSESSMENT — PAIN DESCRIPTION - FREQUENCY: FREQUENCY: INTERMITTENT

## 2023-02-09 ASSESSMENT — PAIN - FUNCTIONAL ASSESSMENT: PAIN_FUNCTIONAL_ASSESSMENT: ACTIVITIES ARE NOT PREVENTED

## 2023-02-09 ASSESSMENT — PAIN DESCRIPTION - PAIN TYPE: TYPE: SURGICAL PAIN

## 2023-02-09 ASSESSMENT — PAIN SCALES - GENERAL: PAINLEVEL_OUTOF10: 2

## 2023-02-09 ASSESSMENT — PAIN DESCRIPTION - DESCRIPTORS: DESCRIPTORS: ACHING

## 2023-02-09 ASSESSMENT — PAIN DESCRIPTION - LOCATION: LOCATION: ABDOMEN

## 2023-02-09 NOTE — PROGRESS NOTES
Urology Progress Note  [facility]    Provider: Radha Horne APRN - CNP  Patient ID:  Admission Date: 2023 Name: iVnita Monroe Date: 2023 MRN: [id number]   Patient Location: [id number]/[id number] : 1950  Attending: Qamar Tong MD Date of Service: 2023  PCP: Karlie Mehta MD     Diagnoses:  1. Preop testing    2. Pre-op chest exam    3. Right renal mass      Assessment/Plan:  POD 1 Right Radical Nephrectomy 2023 per Dr. Lou Morales of nausea this morning, abdomen soft, non-distended, incisions CDI, she is not tolerating PO d/t nausea. Bradycardia noted 45bpm this morning, she does report intractable nausea with Zofran. Was seen in March last year for palpitations and chest pain. Recc  Feeling much better today. Bradycardia resolved. Appreciate EP recommendations, if ehco is negative can be discharged. Patient ambulating today ok, asymptomatic. Abdomen incisions CDI. Soft. Non-distended. Will put patient up for discharge pending echo. Can resume asa at discharge. The patient had a chance to ask questions which were answered. she understands the above plan. Subjective:   Narda Oneill is a 74 y.o. female. She was seen and examined this morning. Today we discussed bradycardia, discussed hopefully discharge tomorrow if workup ok. Objective:   Vitals:  Vitals:    23 0815   BP: (!) 147/89   Pulse: 82   Resp: 18   Temp: 98.5 °F (36.9 °C)   SpO2: 94%       Intake/Output Summary (Last 24 hours) at 2023 1034  Last data filed at 2023 0815  Gross per 24 hour   Intake 2662.64 ml   Output 3050 ml   Net -387.36 ml       Physical Exam:  Gen: Alert and oriented x3, no acute distress  CV: bradycardia  Resp: unlabored respirations  Abd: Soft, non-distended, non-tender, no masses  Ext: no peripheral edema noted, moves upper and lower extremities spontaneously  Skin: warmand well perfused, no rashes noted on the face, or arms. Labs:  Lab Results   Component Value Date    WBC 6.5 02/09/2023    HGB 12.9 02/09/2023    HCT 39.1 02/09/2023    MCV 96.5 02/09/2023     02/09/2023     Lab Results   Component Value Date    CREATININE 1.0 02/09/2023    BUN 9 02/09/2023     02/09/2023    K 3.6 02/09/2023     02/09/2023    CO2 22 02/09/2023       Aldo Baldwin, APRN - CNP   2/9/2023

## 2023-02-09 NOTE — PROGRESS NOTES
Shift assessment complete, patient is alert and oriented X4, VSS, ambulated to bathroom, voiding, bladder scan shows 167 ml, active BS, incision sites CDI. All safety measure in place. The care plan and education has been reviewed and mutually agreed upon with the patient. 0530: Ambulated 1 lap around the unit this morning.

## 2023-02-09 NOTE — PROGRESS NOTES
Hospitalist Progress Note      PCP: Jessie Roy MD    Date of Admission: 2/7/2023    Chief Complaint: I got my kidney taken out    Hospital Course:  74 y.o. female on [employer] MD service who was admitted on 2/7/2023 for elective R robotic assisted laparoscopic radical nephrectomy for R renal mass. Patient with history of COPD, h/o breast cancer, GERD. Patient found to have sinus bradycardia with HR in 50s. Says she is dizzy and weak. Subjective:   Patient denies CP, SOB, HA or dizziness. Has expected post operative abdominal pain. Medications:  Reviewed    Infusion Medications    sodium chloride      sodium chloride       Scheduled Medications    enoxaparin  30 mg SubCUTAneous Daily    sodium chloride flush  5-40 mL IntraVENous 2 times per day    atorvastatin  20 mg Oral Daily    primidone  50 mg Oral BID    sodium chloride flush  5-40 mL IntraVENous 2 times per day    acetaminophen  650 mg Oral Q6H    sennosides-docusate sodium  1 tablet Oral BID     PRN Meds: perflutren lipid microspheres, sodium chloride flush, sodium chloride, SUMAtriptan, sodium chloride flush, sodium chloride, ondansetron **OR** ondansetron, oxyCODONE **OR** oxyCODONE, morphine **OR** morphine, polyethylene glycol      Intake/Output Summary (Last 24 hours) at 2/9/2023 1133  Last data filed at 2/9/2023 0815  Gross per 24 hour   Intake 2662.64 ml   Output 3050 ml   Net -387.36 ml       Physical Exam Performed:    BP (!) 147/89   Pulse 82   Temp 98.5 °F (36.9 °C) (Oral)   Resp 18   Ht 5' 2\" (1.575 m)   Wt 126 lb (57.2 kg)   SpO2 94%   BMI 23.05 kg/m²     General appearance: Appears comfortable. Well nourished  Eyes: Sclera clear, pupils equal  ENT: Moist mucus membranes, no thrush  Neck: Trachea midline, symmetrical  Cardiovascular: RRR. No murmur, gallop, rub. No edema in  lower extremities  Respiratory: Clear to auscultation bilaterally. No wheeze.  Good inspiratory effort  Gastrointestinal: Abdomen soft, expected incisional tenderness, not distended, normal bowel sounds  Musculoskeletal: No cyanosis in digits, warm extremities  Neurologic: Cranial nerves grossly intact, no motor or speech deficits. Psychiatric: Normal affect. Alert and oriented to time, place and person. Skin: Warm, dry, normal turgor, no rash      Labs:   Recent Labs     02/08/23  0445 02/09/23  0414   WBC  --  6.5   HGB 12.0 12.9   HCT 36.2 39.1   PLT  --  200     Recent Labs     02/08/23  0444 02/09/23  0414    141   K 3.8 3.6    110   CO2 22 22   BUN 8 9   CREATININE 0.8 1.0   CALCIUM 8.6 9.0     No results for input(s): AST, ALT, BILIDIR, BILITOT, ALKPHOS in the last 72 hours. No results for input(s): INR in the last 72 hours. Recent Labs     02/08/23  0444 02/08/23  1535 02/08/23  2132   TROPONINI <0.01 <0.01 <0.01       Urinalysis:      Lab Results   Component Value Date/Time    NITRU Negative 09/12/2012 12:00 PM    BLOODU neg 08/28/2013 11:43 AM    BLOODU Positive 09/12/2012 12:00 PM    SPECGRAV 1.015 08/28/2013 11:43 AM    SPECGRAV 1.005 09/12/2012 12:00 PM    GLUCOSEU neg 08/28/2013 11:43 AM    GLUCOSEU neg 09/12/2012 12:00 PM       Radiology:  No orders to display       IP CONSULT TO HOSPITALIST  IP CONSULT TO CARDIOLOGY    Assessment/Plan:    Active Hospital Problems    Diagnosis     Renal mass, right [N28.89]      Priority: Medium    Right renal mass [N28.89]      Priority: Medium     Bradycardia  TSH can be followed up by PCP  Telemetry to r/o arrhythmias  Negative serial troponin to r/o NSTEMI  Awaiting Echo given bradycardia with dizziness  Cardiology consult following  2.         R renal mass              -           S/P R nephrectomy              -           F/U pathology from 2/7/23  3. H/O stroke              -           Resume ASA when ok with Urology              -           Continue Lipitor    DVT Prophylaxis: Lovenox  Diet: ADULT DIET;  Regular  Code Status: Full Code  PT/OT Eval Status: Not requested    Dispo - Home    Discussed with patient and nursing. Discussed with family at bedside. Discussed with Bryce Banda (EP NP). If Echo ok, can be discharged to home. PCP to follow up TSH. F/U with PCP after DC. Medically stable for DC to home when ok with Cardiology. [facility] are signing off at this time. Call if we can help acutely.     Marcos Woodward MD

## 2023-02-09 NOTE — PLAN OF CARE
Problem: Discharge Planning  Goal: Discharge to home or other facility with appropriate resources  Outcome: Progressing  Flowsheets (Taken 2/8/2023 8875 by Miladis Perdomo, RN)  Discharge to home or other facility with appropriate resources:   Identify barriers to discharge with patient and caregiver   Refer to discharge planning if patient needs post-hospital services based on physician order or complex needs related to functional status, cognitive ability or social support system     Problem: ABCDS Injury Assessment  Goal: Absence of physical injury  Outcome: Progressing     Problem: Safety - Adult  Goal: Free from fall injury  Outcome: Progressing     Problem: Skin/Tissue Integrity - Adult  Goal: Skin integrity remains intact  2/8/2023 2155 by Cl Fofana RN  Outcome: Progressing  2/8/2023 0919 by Miladis Perdomo RN  Outcome: Progressing  Goal: Incisions, wounds, or drain sites healing without S/S of infection  2/8/2023 2155 by Cl Fofana RN  Outcome: Progressing  2/8/2023 0919 by Miladis Perdomo RN  Outcome: Progressing     Problem: Gastrointestinal - Adult  Goal: Minimal or absence of nausea and vomiting  2/8/2023 2155 by Cl Fofana RN  Outcome: Progressing  2/8/2023 0919 by Miladis Perdomo RN  Outcome: Progressing  Goal: Maintains or returns to baseline bowel function  2/8/2023 2155 by Cl Fofana RN  Outcome: Progressing  2/8/2023 0919 by Miladis Perdomo RN  Outcome: Progressing

## 2023-02-09 NOTE — PROGRESS NOTES
[facility]   Electrophysiology Progress Note     Date: 2/9/2023  Admit Date: 2/7/2023     Reason for consultation: Bradycardia    Chief Complaint: Flank Pain    History of Present Illness: History obtained from patient and medical record. Clarisa Lake is a 74 y.o. female with a past medical history of COPD, breast cancer, CVA, HLD, and renal mass. Pt admitted for scheduled nephrectomy. S/p nephrectomy (2/7/23). She was having post op pain and nausea. She reportedly had bradycardia in the 40s. She was not on telemetry and no EKG was done. Interval Hx: Today, she is being seen for follow up. Her family is at the bedside. She is feeling well, besides some flank pain. She remains in sinus rhythm with no significant bradycardia on telemetry. Her BP is stable. Awaiting echo to be completed. Patient seen and examined. Clinical notes reviewed. Telemetry reviewed. No new complaints today. No major events overnight. Denies having chest pain, palpitations, shortness of breath, orthopnea/PND, cough, or dizziness at the time of this visit. Allergies: Allergies   Allergen Reactions    Vicodin [Hydrocodone-Acetaminophen] Shortness Of Breath     Patient states that she was given too much pain medication during her ED visit which caused her to have shortness of breath and dizziness    Molds & Smuts Other (See Comments)     congestion    Montelukast Sodium Other (See Comments)     Vision issues    Pollen Extract Other (See Comments)     congestion       Home Meds:  Prior to Visit Medications    Medication Sig Taking? Authorizing Provider   senna-docusate (PERICOLACE) 8.6-50 MG per tablet Take 1 tablet by mouth 2 times daily Yes Leticia Johnson MD   oxyCODONE-acetaminophen (PERCOCET) 5-325 MG per tablet Take 1 tablet by mouth every 6 hours as needed for Pain for up to 3 days. Intended supply: 3 days.  Take lowest dose possible to manage pain Max Daily Amount: 4 tablets Yes Holly Adam MD [place]   primidone (MYSOLINE) 50 MG tablet Take 1 tablet in the morning, 1 tablet at noon and 2 tablets at night  Cesar Up MD   alendronate (FOSAMAX) 70 MG tablet Take 1 tablet by mouth every 7 days  Cesar Up MD   atorvastatin (LIPITOR) 20 MG tablet Take 1 tablet by mouth daily  Patient taking differently: Take 20 mg by mouth daily Patient taking 2 tabs  Cesar Up MD   SUMAtriptan (IMITREX) 100 MG tablet TAKE 1 TABLET BY MOUTH FOR 1 DOSE AS NEEDED FOR MIGRAINE  Cesar Up MD   aspirin 81 MG EC tablet Take 81 mg by mouth daily  Historical Provider, MD   Cholecalciferol (VITAMIN D PO) Take by mouth  Patient not taking: Reported on 2/7/2023  Historical Provider, MD   Ascorbic Acid (VITAMIN C PO) Take by mouth  Historical Provider, MD      Scheduled Meds:   enoxaparin  30 mg SubCUTAneous Daily    sodium chloride flush  5-40 mL IntraVENous 2 times per day    atorvastatin  20 mg Oral Daily    primidone  50 mg Oral BID    sodium chloride flush  5-40 mL IntraVENous 2 times per day    acetaminophen  650 mg Oral Q6H    sennosides-docusate sodium  1 tablet Oral BID     Continuous Infusions:   sodium chloride      sodium chloride       PRN Meds:sodium chloride flush, sodium chloride, SUMAtriptan, sodium chloride flush, sodium chloride, ondansetron **OR** ondansetron, oxyCODONE **OR** oxyCODONE, morphine **OR** morphine, polyethylene glycol     Past Medical History:  Past Medical History:   Diagnosis Date    Acne     Bilateral malignant neoplasm of breast in female, unspecified estrogen receptor status, unspecified site of breast [facility] 1/4/2023    Breast cancer ([facility]     Cellulitis     COPD (chronic obstructive pulmonary disease) [facility]     COPD (chronic obstructive pulmonary disease) ([facility] 4/25/2011    Essential tremor     GERD (gastroesophageal reflux disease)     Hyperlipidemia     Osteoporosis     Polyp of colon     Renal mass, right 11/18/2022        Past Surgical History:    has a past surgical history that includes Appendectomy; Dilation and curettage of uterus; Breast surgery; Hand surgery; eye surgery (Right, 03/21/2015); Mastectomy, bilateral (Bilateral, 1985); Colonoscopy (N/A, 05/02/2019); ORIF DISTAL RADIUS FRACTURE (Right, 09/10/2019); IR KYPHOPLASTY LUMBAR 1 VERTEBRAL BODY (03/04/2021); total nephrectomy (Right, 02/07/2023); and partial nephrectomy (Right, 2/7/2023). Social History:  Reviewed. reports that she quit smoking about 12 years ago. Her smoking use included cigarettes. She has a 30.00 pack-year smoking history. She has never used smokeless tobacco. She reports current alcohol use of about 1.0 standard drink per week. She reports that she does not use drugs. Family History:  Reviewed. family history includes Heart Disease in her mother. Review of Systems:  Constitutional: Negative for fever, night sweats, chills, weight changes, or weakness  Skin: Negative for rash, dry skin, pruritus, bruising, bleeding, blood clots, or changes in skin pigment  HEENT: Negative for vision changes, ringing in the ears, sore throat, dysphagia, or swollen lymph nodes  Respiratory: Reviewed in HPI  Cardiovascular: Reviewed in HPI  Gastrointestinal: Negative for abdominal pain, N/V/D, constipation, or black/tarry stools  Genito-Urinary: Negative for dysuria, incontinence, urgency, or hematuria  Musculoskeletal: Positive for flank pain s/p surgery. Negative for joint swelling, muscle pain, or injuries  Neurological/Psych: Negative for confusion, seizures, headaches, balance issues or TIA-like symptoms.  No anxiety, depression, or insomnia    Physical Examination:  Vitals:    02/09/23 0815   BP: (!) 147/89   Pulse: 82   Resp: 18   Temp: 98.5 °F (36.9 °C)   SpO2: 94%      In: 2722.6 [P.O.:780; I.V.:1942.6]  Out: 3050    Wt Readings from Last 3 Encounters:   02/07/23 126 lb (57.2 kg)   01/25/23 128 lb (58.1 kg)   01/05/23 130 lb (59 kg)       Intake/Output Summary (Last 24 hours) at 2/9/2023 1208  Last data filed at 2/9/2023 8669  Gross per 24 hour   Intake 2662.64 ml   Output 3050 ml   Net -387.36 ml       Telemetry: Personally Reviewed  - Sinus rhythm   Constitutional: Cooperative and in no apparent distress, and appears well nourished  Skin: Warm and pink; no pallor, cyanosis, bruising, or clubbing  HEENT: Symmetric and normocephalic. PERRL, EOM intact. Conjunctiva pink with clear sclera. Mucus membranes pink and moist. Teeth intact. Thyroid smooth without nodules or goiter. Cardiovascular: Regular rate and rhythm. S1/S2 present without murmurs, rubs, or gallops. Peripheral pulses 2+, capillary refill < 3 seconds. No elevation of JVP. No peripheral edema  Respiratory: Respirations symmetric and unlabored. Lungs clear to auscultation bilaterally, no wheezing, crackles, or rhonchi  Gastrointestinal: Abdomen soft and round. Bowel sounds normoactive in all quadrants without tenderness or masses. Musculoskeletal: Bilateral upper and lower extremity strength 5/5 with full ROM  Neurologic/Psych: Awake and orientated to person, place and time. Calm affect, appropriate mood    Pertinent labs, diagnostic, device, and imaging results reviewed as a part of this visit    Labs:    BMP:   Recent Labs     02/08/23  0444 02/09/23 0414    141   K 3.8 3.6    110   CO2 22 22   BUN 8 9   CREATININE 0.8 1.0     Estimated Creatinine Clearance: 39 mL/min (based on SCr of 1 mg/dL).    CBC:   Recent Labs     02/08/23  0445 02/09/23 0414   WBC  --  6.5   HGB 12.0 12.9   HCT 36.2 39.1   MCV  --  96.5   PLT  --  200     Thyroid:   Lab Results   Component Value Date/Time    TSH 1.14 09/20/2017 09:26 AM     Lipids:   Lab Results   Component Value Date/Time    CHOL 189 03/18/2022 10:24 AM    HDL 67 03/18/2022 10:24 AM    TRIG 99 03/18/2022 10:24 AM     LFTS:   Lab Results   Component Value Date/Time    ALT 11 01/19/2023 09:58 AM    AST 15 01/19/2023 09:58 AM    ALKPHOS 99 01/19/2023 09:58 AM PROT 6.8 2023 09:58 AM    PROT 7.5 2012 10:43 AM    AGRATIO 1.3 2023 09:58 AM    BILITOT <0.2 2023 09:58 AM     Cardiac Enzymes:   Lab Results   Component Value Date/Time    TROPONINI <0.01 2023 09:32 PM    TROPONINI <0.01 2023 03:35 PM    TROPONINI <0.01 2023 04:44 AM     Coags:   Lab Results   Component Value Date/Time    PROTIME 13.0 2023 09:58 AM    INR 0.99 2023 09:58 AM       EC23 (Personally Interpreted)   - NSR. Rate 59, QRS 86, QTc 348    ECHO: 23  Pending    Stress Test: None    CXR: 23  No new focal lung opacity. Problem List:   Patient Active Problem List    Diagnosis Date Noted    Renal mass, right 2023    Bilateral malignant neoplasm of breast in female, unspecified estrogen receptor status, unspecified site of breast ([facility] 2023    Right renal mass 2022    Current occasional smoker 2021    Cystoid macular degeneration of retina 12/10/2019    Lens replaced by other means 12/10/2019    Vitreomacular adhesion, left eye 12/10/2019    Vitreous degeneration, left eye 12/10/2019    Closed fracture of right distal radius     Constipation 2019    Osteoporosis 2019    Intention tremor 2019    Cataract, nuclear sclerotic senile, left 10/17/2018    Hip mass     Epiretinal membrane 2015    COPD (chronic obstructive pulmonary disease) ([facility] 2011    Pleural plaque with presence of asbestos 2011    GERD (gastroesophageal reflux disease) 2011    Hyperlipidemia     Polyp of colon         Assessment and Plan:     Bradycardia   - Noted in setting of pain and nausea/vomiting. Not on telemetry at time of event. Likely vagal mediated due to her surgical pain/nausea   - Currently sinus in 70-80s. No significant bradycardia on telemetry   - Echo pending. If normal, no further work up needed    2.  Elevated BP   - Noted in hospital. Likely secondary to surgery/pain   - Consider ambulatory monitoring at home following surgeical recovery. If remains elevated, start anti-hypertensives    3. Hx of CVA   - Resume ASA, statin when able    4. Renal Cancer   - S/p nephrectomy   - Management per urology    [id number] for discharge from EP standpoint. No follow up needed if echo unremarkable. EP will sign off. Please call if there are any questions. Thank you for consult. All pertinent information and plan of care discussed with the EP physician. All questions and concerns were addressed to the patient. Alternatives to my treatment were discussed. I have discussed the above stated plan with patient and family and the nurse. The patient and family verbalized understanding and agreed with the plan.     Thank you for allowing to us to participate in the care of Bogdan 4, APRN-CNP  [address]   Office: [phone]

## 2023-02-09 NOTE — PROGRESS NOTES
Shift assessment completed. Neuro WNL. Reports pain 2/10 abd at this time. AM meds administered per MAR. Surgical incision CDI; no redness or drainage noted. Patient voiding and ambulating in hallway. The care plan and education has been reviewed and mutually agreed upon with the patient. Standard safety measures in place. 1:33 PM  IV removed prior dc. The care plan and education has been resolved and completed. Discharge instructions and medications reviewed with patient. Patient voices understanding and denies further concerns at this time. Patient discharged home per order with all personal belongings. Patient preferred to ambulate to private transport home.

## 2023-02-10 ENCOUNTER — CARE COORDINATION (OUTPATIENT)
Dept: CASE MANAGEMENT | Age: 73
End: 2023-02-10

## 2023-02-10 DIAGNOSIS — N28.89 RIGHT RENAL MASS: Primary | ICD-10-CM

## 2023-02-10 PROCEDURE — 1111F DSCHRG MED/CURRENT MED MERGE: CPT | Performed by: FAMILY MEDICINE

## 2023-02-10 NOTE — CARE COORDINATION
[facility] Care Transitions Initial Follow Up Call    Call within 2 business days of discharge: Yes    Patient Current Location:  Home: [address] [place] [address] Road . [address]    Care Transition Nurse contacted the patient by telephone to perform post hospital discharge assessment. Verified name and  with patient as identifiers. Provided introduction to self, and explanation of the Care Transition Nurse role. Patient: Zeny Chaudhary Patient : 1950   MRN: [id number]  Reason for Admission: Robotic assisted laparoscopic right-sided radical nephrectomy  Discharge Date: 23 RARS: Readmission Risk Score: 7.7      Last Discharge  Street       Date Complaint Diagnosis Description Type Department Provider    23  Preop testing . .. Admission (Discharged) Jersey Madrid MD            Was this an external facility discharge? No Discharge Facility:     Challenges to be reviewed by the provider   Additional needs identified to be addressed with provider: No  none               Method of communication with provider: none. Patient reports that she is doing well, minimal pain, denies fever. She is eating and taking fluids without difficulty, passing stool and urine. Her abdomen is swollen, not hard and patient will monitor for distension and hardness. Her incision sites are free of redness, swelling, drainage. Discussed discharge instructions and reviewed medications, 1111F completed. She had a question about pathology, CTN referred her to surgeon, she burgess see him next Friday. CTN will continue with outreach follow up calls. Care Transition Nurse reviewed discharge instructions and red flags with patient who verbalized understanding. The patient was given an opportunity to ask questions and does not have any further questions or concerns at this time. Were discharge instructions available to patient? Yes.  Reviewed appropriate site of care based on symptoms and resources available to patient including: PCP  Urgent care clinics  When to call 911. The patient agrees to contact the PCP office for questions related to their healthcare. Advance Care Planning:   Does patient have an Advance Directive: not on file; education provided. Medication reconciliation was performed with patient, who verbalizes understanding of administration of home medications. Medications reviewed, 1111F entered: yes    Was patient discharged with a pulse oximeter? no    Non-face-to-face services provided:  Obtained and reviewed discharge summary and/or continuity of care documents    Offered patient enrollment in the Remote Patient Monitoring (RPM) program for in-home monitoring: Will offer on future call . Care Transitions 24 Hour Call    Do you have a copy of your discharge instructions?: Yes  Do you have all of your prescriptions and are they filled?: Yes  Have you been contacted by a [address]?: No  Have you scheduled your follow up appointment?: Yes  How are you going to get to your appointment?: Car - family or friend to transport  Do you have support at home?: Alone  Do you feel like you have everything you need to keep you well at home?: Yes  Are you an active caregiver in your home?: No  Care Transitions Interventions         Discussed follow-up appointments. If no appointment was previously scheduled, appointment scheduling offered: Yes. Is follow up appointment scheduled within 7 days of discharge? Yes. Follow Up  Future Appointments   Date Time Provider Víctor Porter   2/14/2023  2:00 PM Gentry Ley MD [facility] Transition Nurse provided contact information. Plan for follow-up call in 3-5 days based on severity of symptoms and risk factors.   Plan for next call: symptom management-.  self management-.  follow-up appointment-.    Kareen Louis RN

## 2023-02-14 ENCOUNTER — OFFICE VISIT (OUTPATIENT)
Dept: FAMILY MEDICINE CLINIC | Age: 73
End: 2023-02-14

## 2023-02-14 VITALS
BODY MASS INDEX: 22.86 KG/M2 | WEIGHT: 125 LBS | OXYGEN SATURATION: 100 % | HEART RATE: 80 BPM | DIASTOLIC BLOOD PRESSURE: 92 MMHG | SYSTOLIC BLOOD PRESSURE: 142 MMHG

## 2023-02-14 DIAGNOSIS — J43.9 PULMONARY EMPHYSEMA, UNSPECIFIED EMPHYSEMA TYPE (HCC): ICD-10-CM

## 2023-02-14 DIAGNOSIS — E78.5 HYPERLIPIDEMIA, UNSPECIFIED HYPERLIPIDEMIA TYPE: ICD-10-CM

## 2023-02-14 DIAGNOSIS — C50.912 BILATERAL MALIGNANT NEOPLASM OF BREAST IN FEMALE, UNSPECIFIED ESTROGEN RECEPTOR STATUS, UNSPECIFIED SITE OF BREAST (HCC): Primary | ICD-10-CM

## 2023-02-14 DIAGNOSIS — E78.2 MIXED HYPERLIPIDEMIA: ICD-10-CM

## 2023-02-14 DIAGNOSIS — M81.8 OTHER OSTEOPOROSIS WITHOUT CURRENT PATHOLOGICAL FRACTURE: ICD-10-CM

## 2023-02-14 DIAGNOSIS — Z00.00 ANNUAL PHYSICAL EXAM: ICD-10-CM

## 2023-02-14 DIAGNOSIS — N28.89 RIGHT RENAL MASS: ICD-10-CM

## 2023-02-14 DIAGNOSIS — Z09 HOSPITAL DISCHARGE FOLLOW-UP: ICD-10-CM

## 2023-02-14 DIAGNOSIS — C50.911 BILATERAL MALIGNANT NEOPLASM OF BREAST IN FEMALE, UNSPECIFIED ESTROGEN RECEPTOR STATUS, UNSPECIFIED SITE OF BREAST (HCC): Primary | ICD-10-CM

## 2023-02-14 RX ORDER — ATORVASTATIN CALCIUM 20 MG/1
20 TABLET, FILM COATED ORAL DAILY
Qty: 90 TABLET | Refills: 3 | Status: SHIPPED | OUTPATIENT
Start: 2023-02-14

## 2023-02-14 RX ORDER — PREGABALIN 25 MG/1
25 CAPSULE ORAL 3 TIMES DAILY
COMMUNITY
Start: 2023-01-19 | End: 2023-04-13

## 2023-02-14 RX ORDER — BUDESONIDE AND FORMOTEROL FUMARATE DIHYDRATE 160; 4.5 UG/1; UG/1
2 AEROSOL RESPIRATORY (INHALATION) 2 TIMES DAILY
Qty: 3 EACH | Refills: 3 | Status: SHIPPED | OUTPATIENT
Start: 2023-02-14

## 2023-02-14 RX ORDER — ALBUTEROL SULFATE 90 UG/1
2 AEROSOL, METERED RESPIRATORY (INHALATION) EVERY 6 HOURS PRN
Qty: 3 EACH | Refills: 3 | Status: SHIPPED | OUTPATIENT
Start: 2023-02-14

## 2023-02-14 NOTE — PROGRESS NOTES
Post-Discharge Transitional Care Follow Up      Samara Banks   YOB: 1950    Date of Office Visit:  2/14/2023  Date of Hospital Admission: 2/7/23  Date of Hospital Discharge: 2/9/23  Readmission Risk Score (high >=14%. Medium >=10%):Readmission Risk Score: 7.7      Care management risk score Rising risk (score 2-5) and Complex Care (Scores >=6): No Risk Score On File     Non face to face  following discharge, date last encounter closed (first attempt may have been earlier): 02/10/2023     Call initiated 2 business days of discharge: Yes     Bilateral malignant neoplasm of breast in female, unspecified estrogen receptor status, unspecified site of breast ([facility] [address]  Mixed hyperlipidemia  -     atorvastatin (LIPITOR) 20 MG tablet; Take 1 tablet by mouth daily, Disp-90 tablet, R-3**Patient requests 90 days supply**Normal  Annual physical exam  -     budesonide-formoterol (SYMBICORT) 160-4.5 MCG/ACT AERO; Inhale 2 puffs into the lungs 2 times daily, Disp-3 each, R-3Normal  Hyperlipidemia, unspecified hyperlipidemia type  -     budesonide-formoterol (SYMBICORT) 160-4.5 MCG/ACT AERO; Inhale 2 puffs into the lungs 2 times daily, Disp-3 each, R-3Normal  Pulmonary emphysema, unspecified emphysema type (HCC)  -     budesonide-formoterol (SYMBICORT) 160-4.5 MCG/ACT AERO; Inhale 2 puffs into the lungs 2 times daily, Disp-3 each, R-3Normal  Right renal mass  -     Ambulatory Referral to Care Management with Device (Remote Patient Monitoring)  Other osteoporosis without current pathological fracture  Hospital discharge follow-up  -     MI DISCHARGE MEDS RECONCILED W/ CURRENT OUTPATIENT MED LIST      Medical Decision Making: moderate complexity  No follow-ups on file.     On this date 2/14/2023 I have spent 15 minutes reviewing previous notes, test results and face to face with the patient discussing the diagnosis and importance of compliance with the treatment plan as well as documenting on the day of the visit. Subjective:   HPI for robotic laparoscopic right radical nephrectomy due to right renal mass    Inpatient course: Discharge summary reviewed- see chart. Did well postop  Interval history/Current status: Doing well at home bowels are moving off Percocet after 3 doses    Patient Active Problem List   Diagnosis    Hyperlipidemia    Polyp of colon    COPD (chronic obstructive pulmonary disease) (HCC)    Pleural plaque with presence of asbestos    GERD (gastroesophageal reflux disease)    Hip mass    Osteoporosis    Intention tremor    Constipation    Closed fracture of right distal radius    Cataract, nuclear sclerotic senile, left    Cystoid macular degeneration of retina    Lens replaced by other means    Epiretinal membrane    Vitreomacular adhesion, left eye    Vitreous degeneration, left eye    Current occasional smoker    Right renal mass    Bilateral malignant neoplasm of breast in female, unspecified estrogen receptor status, unspecified site of breast ([facility] [address]    Renal mass, right       Medications listed as ordered at the time of discharge from hospital     Medication List            Accurate as of February 14, 2023  4:28 PM. If you have any questions, ask your nurse or doctor.                 START taking these medications      albuterol sulfate  (90 Base) MCG/ACT inhaler  Commonly known as: PROVENTIL;VENTOLIN;PROAIR  Inhale 2 puffs into the lungs every 6 hours as needed for Wheezing  Started by: Sara Wong MD     budesonide-formoterol 160-4.5 MCG/ACT Aero  Commonly known as: Symbicort  Inhale 2 puffs into the lungs 2 times daily  Started by: Sara Wong MD            CHANGE how you take these medications      atorvastatin 20 MG tablet  Commonly known as: LIPITOR  Take 1 tablet by mouth daily  What changed: additional instructions            CONTINUE taking these medications      alendronate 70 MG tablet  Commonly known as: FOSAMAX  Take 1 tablet by mouth every 7 days     aspirin 81 MG EC tablet     pregabalin 25 MG capsule  Commonly known as: LYRICA     primidone 50 MG tablet  Commonly known as: Mysoline  Take 1 tablet in the morning, 1 tablet at noon and 2 tablets at night     senna-docusate 8.6-50 MG per tablet  Commonly known as: PERICOLACE  Take 1 tablet by mouth 2 times daily     SUMAtriptan 100 MG tablet  Commonly known as: IMITREX  TAKE 1 TABLET BY MOUTH FOR 1 DOSE AS NEEDED FOR MIGRAINE     VITAMIN C PO     VITAMIN D PO               Where to Get Your Medications        These medications were sent to  [facility] [phone]  [address] [place] [address] [address], [facility],[address]      Phone: [phone]   albuterol sulfate  (90 Base) MCG/ACT inhaler  atorvastatin 20 MG tablet  budesonide-formoterol 160-4.5 MCG/ACT Aero          Medications marked \"taking\" at this time  Outpatient Medications Marked as Taking for the 2/14/23 encounter (Office Visit) with Karen Ramey MD   Medication Sig Dispense Refill    pregabalin (LYRICA) 25 MG capsule Take 25 mg by mouth 3 times daily.       atorvastatin (LIPITOR) 20 MG tablet Take 1 tablet by mouth daily 90 tablet 3    albuterol sulfate HFA (PROVENTIL;VENTOLIN;PROAIR) 108 (90 Base) MCG/ACT inhaler Inhale 2 puffs into the lungs every 6 hours as needed for Wheezing 3 each 3    budesonide-formoterol (SYMBICORT) 160-4.5 MCG/ACT AERO Inhale 2 puffs into the lungs 2 times daily 3 each 3    senna-docusate (PERICOLACE) 8.6-50 MG per tablet Take 1 tablet by mouth 2 times daily 50 tablet 0    primidone (MYSOLINE) 50 MG tablet Take 1 tablet in the morning, 1 tablet at noon and 2 tablets at night 360 tablet 1    alendronate (FOSAMAX) 70 MG tablet Take 1 tablet by mouth every 7 days 12 tablet 3    SUMAtriptan (IMITREX) 100 MG tablet TAKE 1 TABLET BY MOUTH FOR 1 DOSE AS NEEDED FOR MIGRAINE 9 tablet 5    aspirin 81 MG EC tablet Take 81 mg by mouth daily      Cholecalciferol (VITAMIN D PO) Take by mouth      Ascorbic Acid (VITAMIN C PO) Take by mouth          Medications patient taking as of now reconciled against medications ordered at time of hospital discharge: Yes    Review of Systems  Minimal pain  Objective:    BP (!) 142/92   Pulse 80   Wt 125 lb (56.7 kg)   SpO2 100%   BMI 22.86 kg/m²   Physical Exam  General, alert pleasant in no acute distress  Clear to auscultation, no wheezes rhonchi or rales  Heart regular rate and rhythm without gallop or rub  Abdomen: Soft mild incisional tenderness no redness or pus  Extremities no cyanosis clubbing edema    Lab Results   Component Value Date     02/09/2023    K 3.6 02/09/2023     02/09/2023    CO2 22 02/09/2023    BUN 9 02/09/2023    CREATININE 1.0 02/09/2023    GLUCOSE 88 02/09/2023    CALCIUM 9.0 02/09/2023    PROT 6.8 01/19/2023    LABALBU 3.9 01/19/2023    BILITOT <0.2 01/19/2023    ALKPHOS 99 01/19/2023    AST 15 01/19/2023    ALT 11 01/19/2023    LABGLOM 59 (A) 02/09/2023    GFRAA >60 03/18/2022    AGRATIO 1.3 01/19/2023    GLOB 1.9 04/21/2021        Lab Results   Component Value Date    WBC 6.5 02/09/2023    HGB 12.9 02/09/2023    HCT 39.1 02/09/2023    MCV 96.5 02/09/2023     02/09/2023          Encounter Diagnoses   Name Primary? Mixed hyperlipidemia     Annual physical exam     Hyperlipidemia, unspecified hyperlipidemia type     Pulmonary emphysema, unspecified emphysema type ([address]     Bilateral malignant neoplasm of breast in female, unspecified estrogen receptor status, unspecified site of breast ([address] Yes    Right renal mass     Other osteoporosis without current pathological fracture     Hospital discharge follow-up    All above stable  Routine postop care  Follow-up with urology    An electronic signature was used to authenticate this note.   --Kamar Coello MD

## 2023-02-16 ENCOUNTER — CARE COORDINATION (OUTPATIENT)
Dept: CASE MANAGEMENT | Age: 73
End: 2023-02-16

## 2023-02-16 NOTE — CARE COORDINATION
Follow up outreach call attempt, no answer.  CTN left VM with contact information and request for return call.  CTN will continue with outreach call attempts.

## 2023-02-17 ENCOUNTER — CARE COORDINATION (OUTPATIENT)
Dept: CARE COORDINATION | Age: 73
End: 2023-02-17

## 2023-02-17 ENCOUNTER — CARE COORDINATION (OUTPATIENT)
Dept: CASE MANAGEMENT | Age: 73
End: 2023-02-17

## 2023-02-17 DIAGNOSIS — J43.9 PULMONARY EMPHYSEMA, UNSPECIFIED EMPHYSEMA TYPE (HCC): Primary | ICD-10-CM

## 2023-02-17 NOTE — PROGRESS NOTES
Physician Progress Note      Jazmin Ling  [facility] #:                  [id number]  :                       1950  ADMIT DATE:       2023 8:47 AM  DISCH DATE:        2023 1:54 PM  RESPONDING  PROVIDER #:        Arvind Khan          QUERY TEXT:    Pt admitted with right renal mass and noted to have surgical pathology   consistent with clear cell renal cell carcinoma. If possible, please document   in progress notes and d/c summary a correlating diagnosis to explain   pathology findings: The medical record reflects the following:  Risk Factors: right renal mass  Clinical Indicators: Per H&P note- RIGHT sided renal mass. Per OP note-   Robotic assisted laparoscopic radical nephrectomy on the RIGHT. Per pathology-   Clear cell renal cell carcinoma  Treatment: s/p- right radical nephrectomy  Options provided:  -- Clear cell renal cell carcinoma  -- Other - I will add my own diagnosis  -- Disagree - Not applicable / Not valid  -- Disagree - Clinically unable to determine / Unknown  -- Refer to Clinical Documentation Reviewer    PROVIDER RESPONSE TEXT:    This patient had a right renal mass consistent with Clear cell renal cell   carcinoma.     Query created by: Linda Llanos on 2023 12:25 PM      Electronically signed by:  Arvind Khan 2023 8:23 AM

## 2023-02-17 NOTE — CARE COORDINATION
Patient reached out to CTN 23, CTN attempted to return call today, phone rang, picked up and silence, no VM available. CTN will continue with outreach follow up calls    CTN made another attempt, did get VM this call, left message with contact information and request for return call. [facility] Care Transitions Follow Up Call    Patient Current Location:  Home: [address]    Care Transition Nurse received return call from patient. Verified name and  for HIPAA. Patient: Felipa Stanley  Patient : 1950   MRN: [id number]  Reason for Admission: Robotic assisted laparoscopic right-sided radical nephrectomy  Discharge Date: 23 RARS: Readmission Risk Score: 7.7      Needs to be reviewed by the provider   Additional needs identified to be addressed with provider: No  none             Method of communication with provider: none. Patient reports that she is doing well, saw Dr. Eboni Crockett today and \"received a clean bill of health\", her tumor was isolated and margins negative, no lymph node involvement. She is very happy and relieved. She denies any questions or concerns at this time. CTN will continue with outreach follow up calls. Follow Up  Future Appointments   Date Time Provider Víctor Porter   3/28/2023  3:30 PM MD NIKOLAS Loja     Non-[facility] follow up appointment(s):     Care Transition Nurse reviewed red flags and discussed any barriers to care and/or understanding of plan of care after discharge. Discussed appropriate site of care based on symptoms and resources available to patient including: PCP  Urgent care clinics  When to call 911. The patient agrees to contact the PCP office for questions related to their healthcare. Advance Care Planning:   not on file.          Offered patient enrollment in the Remote Patient Monitoring (RPM) program for in-home monitoring: Yes, patient enrolled:     Remote Patient Monitoring Enrollment Note      Date/Time: 2/17/2023 2:32 PM    Offered patient enrollment in the [facility] Remote Patient Monitoring (RPM) program for in home monitoring for COPD. Patient accepted RPM services. Patient will be monitoring the following daily:  blood pressure reading, blood pressure heart rate, pulse ox reading, pulse ox heart rate, survey response, and weight    CTN reviewed the information below with patient:    Emergency Contact (name and contact number): Rochell Buerger    [x] A member from the care coordination team will reach out to notify the patient once the RPM kit is ordered. [x] Once the kit is delivered, the [facility] team will contact the patient after UPS deliver to assist with set up. [x] Determined BP cuff size: small (6.3\"-9.4\")      [x] Determined weight scale: regular (<330lbs)                                                [x] Hours of ACM monitoring - Monday-Friday [id number]                     All questions about RPM program answered at this time. .     Care Transitions Subsequent and Final Call    Subsequent and Final Calls  Care Transitions Interventions  Other Interventions:             Care Transition Nurse provided contact information for future needs. Plan for follow-up call in 5-7 days based on severity of symptoms and risk factors.   Plan for next call: symptom management-.  self management-.  follow-up appointment-.    Jill Esposito RN

## 2023-02-17 NOTE — PROGRESS NOTES
2/17/23 6:18 PM       Remote Patient Monitoring Treatment Plan    Received request from ACSCOTTIE/TRIPP Cruz RN to order remote patient monitoring for in home monitoring of COPD and order completed. Patient will be monitoring blood pressure   pulse ox   weight  survey questions. Patient will engage in Remote Patient Monitoring each day to develop the skills necessary for self management. RPM Care Team Responsibilities:   Alerts will be reviewed daily and addressed within 2-4 hours during operational hours (Monday -Friday 9 am-4 pm)  Alert response and intervention documented in patient medical record  Alert response escalated to PCP per protocol and documented in patient medical record  Patient monitored over approximately  days  Discharge from program based on self-management readiness    See care coordination encounters for additional details.       Sara Cohen DNP, FNP-C, Remote Patient Monitoring NP, () [phone]

## 2023-02-17 NOTE — CARE COORDINATION
Remote Patient Kit Ordering Note      Date/Time:  2/17/2023 2:55 PM      [x] CCSS confirmed patient shipping address  [x] Patient will receive package over the next 2-4 business days. Someone 21 years or older must be present to sign for UPS delivery. [x] Patient to contact virtual installation-specific phone number listed in the patient instructions. [x] If the patient does not contact HRS within 24 hours, an [employer] [facility] [place] will call the patient directly: If the patient does not answer, HRS will follow up with the clinical team notifying them about the unsuccessful attempt to contact the patient. HRS will make three call attempts to the patient. [x] LPN will contact patient once equipment is active to welcome them to the program.                                                         [x] Hours of RPM monitoring - Monday-Friday [phone]                     All questions answered at this time. CTN made aware the RPM kit has been ordered. CCSS notified patient of RPM equipment order.

## 2023-02-20 ENCOUNTER — TELEPHONE (OUTPATIENT)
Dept: FAMILY MEDICINE CLINIC | Age: 73
End: 2023-02-20

## 2023-02-20 DIAGNOSIS — N28.89 RIGHT RENAL MASS: Primary | ICD-10-CM

## 2023-02-20 NOTE — TELEPHONE ENCOUNTER
Pt called and stated she got her kidney removed on Feb 7th 2023 and the surgeon would like pt to do a follow up with an Oncologist     Diagnosis: cancerous kidney    She needs a referral to one       Her secondary insurance is [employer]      Please advise and contact patient whether this would be possible

## 2023-02-21 ENCOUNTER — CARE COORDINATION (OUTPATIENT)
Dept: CARE COORDINATION | Age: 73
End: 2023-02-21

## 2023-02-21 NOTE — CARE COORDINATION
Date/Time:  2/21/2023 2:50 PM  LPN attempted to reach patient by telephone regarding  RPM Welcome call  in remote symptom monitoring program. Left HIPPA compliant message requesting a return call. Will attempt to reach patient again as time permits.     Current Patient Metrics ---- Blood Pressure: 111/82, 96bpm Pulseox: 98%, 86bpm Survey: C Weight: 128.6lbs Note Created at: 02/21/2023 02:50 PM ET ---- Time-Spent: 5 minutes 0 seconds

## 2023-02-21 NOTE — CARE COORDINATION
CCSS reviewed patients reported daily Remote Patient Monitoring metrics. All reported metrics are within alert parameters. Plan/Follow Up:  Will continue to review, monitor and address alerts with follow up based on severity of symptoms and risk factors     PENDING INTAKE CALL    Ethan Trimble   Cell: [phone]

## 2023-02-23 ENCOUNTER — CARE COORDINATION (OUTPATIENT)
Dept: CASE MANAGEMENT | Age: 73
End: 2023-02-23

## 2023-02-23 NOTE — CARE COORDINATION
Remote Patient Monitoring Note      Date/Time:  2/23/2023 3:45 PM    LPN reviewed patients reported daily Remote Patient Monitoring metrics. All reported metrics are within alert parameters. Plan/Follow Up:  Will continue to review, monitor and address alerts with follow up based on severity of symptoms and risk factors    ---- Current Patient Metrics ---- Blood Pressure: 112/86, 108bpm Pulseox: 97%, 95bpm Survey: C Weight: 125.6lbs Note Created at: 02/23/2023 03:46 PM ET ---- Time-Spent: 3 minutes 0 seconds

## 2023-02-24 ENCOUNTER — CARE COORDINATION (OUTPATIENT)
Dept: CASE MANAGEMENT | Age: 73
End: 2023-02-24

## 2023-02-24 ENCOUNTER — CARE COORDINATION (OUTPATIENT)
Dept: CARE COORDINATION | Age: 73
End: 2023-02-24

## 2023-02-24 DIAGNOSIS — G43.909 MIGRAINE WITHOUT STATUS MIGRAINOSUS, NOT INTRACTABLE, UNSPECIFIED MIGRAINE TYPE: ICD-10-CM

## 2023-02-24 RX ORDER — SUMATRIPTAN 100 MG/1
TABLET, FILM COATED ORAL
Qty: 9 TABLET | Refills: 5 | Status: SHIPPED | OUTPATIENT
Start: 2023-02-24

## 2023-02-24 NOTE — CARE COORDINATION
[facility] Care Transitions Follow Up Call    Patient Current Location:  Home: [address] [facility] [address]    Care Transition Nurse contacted the patient by telephone. Verified name and  with patient as identifiers. Patient: Falguni Seals  Patient : 1950   MRN: [id number]  Reason for Admission: Robotic assisted laparoscopic right-sided radical nephrectomy  Discharge Date: 23 RARS: Readmission Risk Score: 7.7      Needs to be reviewed by the provider   Additional needs identified to be addressed with provider: No  none             Method of communication with provider: none. Patient reports that she is doing well, will consult with Dr. New Steve next week. She is concerned about \"clear cell renal carcinoma\" diagnosis. She denies any questions or concerns at this time and will follow up with PCP 3/28/23. CTN will continue with outreach follow up calls. Follow Up  Future Appointments   Date Time Provider Víctor Porter   3/28/2023  3:30 PM MD NIKOLAS Ramirez - [place]     Non[facility] follow up appointment(s):     Care Transition Nurse reviewed discharge instructions and red flags with patient and discussed any barriers to care and/or understanding of plan of care after discharge. Discussed appropriate site of care based on symptoms and resources available to patient including: PCP  Urgent care clinics  Provider home visits. The patient agrees to contact the PCP office for questions related to their healthcare. Advance Care Planning:   not on file. Offered patient enrollment in the Remote Patient Monitoring (RPM) program for in-home monitoring: Yes, patient already enrolled.      Care Transitions Subsequent and Final Call    Subsequent and Final Calls  Do you have any ongoing symptoms?: No  Have your medications changed?: No  Do you currently have any active services?: No  Do you have any needs or concerns that I can assist you with?: No  Identified Barriers: None  Care Transitions Interventions  Other Interventions:             Care Transition Nurse provided contact information for future needs. Plan for follow-up call in 5-7 days based on severity of symptoms and risk factors.   Plan for next call: symptom management-.  self management-.  follow-up appointment-.    Steven Gottlieb RN

## 2023-02-24 NOTE — TELEPHONE ENCOUNTER
Medication:   Requested Prescriptions     Pending Prescriptions Disp Refills    SUMAtriptan (IMITREX) 100 MG tablet 9 tablet 5     Sig: TAKE 1 TABLET BY MOUTH FOR 1 DOSE AS NEEDED FOR MIGRAINE        Last Filled:  3/18/2022    Patient Phone Number: [phone] (home)     Last appt: 2/14/2023   Next appt: 3/28/2023    Last OARRS: No flowsheet data found.

## 2023-02-24 NOTE — CARE COORDINATION
CCSS reviewed patients reported daily Remote Patient Monitoring metrics. All reported metrics are within alert parameters. Plan/Follow Up:  Will continue to review, monitor and address alerts with follow up based on severity of symptoms and risk factors      Current Patient Metrics ---- Blood Pressure: 123/97, 106bpm Pulseox: 98%, 105bpm Survey: C Weight: 126.4lbs Note Created at: 02/24/2023 12:00 PM CT ---- Time-Spent: 2 minutes 0 seconds    Ethan Malloy   Cell: [phone]

## 2023-02-24 NOTE — TELEPHONE ENCOUNTER
Medication and Quantity requested:    SUMAtriptan (IMITREX) 100 MG tablet [[id number]      Last Visit    02/14/2023    Pharmacy and phone number updated in EPIC:  yes    pharm  Meds by mail

## 2023-02-27 ENCOUNTER — CARE COORDINATION (OUTPATIENT)
Dept: CARE COORDINATION | Age: 73
End: 2023-02-27

## 2023-02-27 NOTE — CARE COORDINATION
Remote Patient Monitoring Note      Date/Time:  2/27/2023 9:11 AM    CCSS reviewed patients reported daily Remote Patient Monitoring metrics. All reported metrics are within alert parameters. Plan/Follow Up:  Will continue to review, monitor and address alerts with follow up based on severity of symptoms and risk factors  Current Patient Metrics ---- Blood Pressure: 129/88, 98bpm Pulseox: 98%, 101bpm Survey: - Weight: 127.2lbs Note Created at: 02/27/2023 09:11 AM ET ---- Time-Spent: 2 minutes 0 seconds

## 2023-02-28 ENCOUNTER — CARE COORDINATION (OUTPATIENT)
Dept: CARE COORDINATION | Age: 73
End: 2023-02-28

## 2023-02-28 NOTE — CARE COORDINATION
Remote Patient Monitoring Note      Date/Time:  2/28/2023 10:02 AM    CCSS reviewed patients reported daily Remote Patient Monitoring metrics. All reported metrics are within alert parameters. Plan/Follow Up:  Will continue to review, monitor and address alerts with follow up based on severity of symptoms and risk factors  Current Patient Metrics ---- Blood Pressure: 121/89, 105bpm Pulseox: 99%, 98bpm Survey: - Weight: 127.4lbs Note Created at: 02/28/2023 10:02 AM ET ---- Time-Spent: 2 minutes 0 seconds Yes

## 2023-03-01 ENCOUNTER — CARE COORDINATION (OUTPATIENT)
Dept: CARE COORDINATION | Age: 73
End: 2023-03-01

## 2023-03-01 NOTE — CARE COORDINATION
Remote Patient Monitoring Note      Date/Time:  3/1/2023 12:51 PM    CCSS reviewed patients reported daily Remote Patient Monitoring metrics. All reported metrics are within alert parameters. Plan/Follow Up:  Will continue to review, monitor and address alerts with follow up based on severity of symptoms and risk factors  Current Patient Metrics ---- Blood Pressure: 118/81, 102bpm Pulseox: 97%, 103bpm Survey: C Weight: 128.0lbs Note Created at: 03/01/2023 12:51 PM ET ---- Time-Spent: 2 minutes 0 seconds

## 2023-03-02 ENCOUNTER — CARE COORDINATION (OUTPATIENT)
Dept: CASE MANAGEMENT | Age: 73
End: 2023-03-02

## 2023-03-02 NOTE — CARE COORDINATION
Remote Patient Monitoring Welcome Note      Date/Time:  3/2/2023 11:29 AM  Patient Current Location: Home: [address]  [address] [address]    Verified patients name and  as identifiers. Completed and confirmed the following:     Emergency Contact: Monroe Hector (Child) [phone]    [x] Patient received all RPM equipment (tablet, scale, blood pressure device and cuff, and pulse oximeter)  Cuff Size: Small  []  Regular   [x]  Large  []   Weight Scale: Regular   [x]  Bariatric  []               [x] Instructed patient keep box for use when returning equipment                                                          [x] Reviewed Patient Welcome Letter with patient                         [x] Reviewed expectations for patient and care team       [x] Instructed patient to keep scale on flat surface                                                         [x] Instructed patient to keep tablet plugged in at all times                         [x] Instructed how to contact IT support (number listed on welcome letter)  [x] Provided Remote Patient Monitoring care  information                 All questions answered at this time. Remote Patient Monitoring Note      Date/Time:  3/2/2023 11:29 AM    LPN reviewed patients reported daily Remote Patient Monitoring metrics. All reported metrics are within alert parameters. Plan/Follow Up:  Will continue to review, monitor and address alerts with follow up based on severity of symptoms and risk factors - Current Patient Metrics ---- Blood Pressure: 123/80, 94bpm Pulseox: 98%, 94bpm Survey: C Weight: 127.8lbs Note Created at: 2023 11:30 AM ET ---- Time-Spent: 8 minutes 0 seconds

## 2023-03-03 ENCOUNTER — CARE COORDINATION (OUTPATIENT)
Dept: CASE MANAGEMENT | Age: 73
End: 2023-03-03

## 2023-03-03 NOTE — CARE COORDINATION
[facility] Care Transitions Follow Up Call    Patient Current Location:  Home: [address] [place] [address] Road [address] contacted the patient by telephone to follow up after admission on 2023. Verified name and  with patient as identifiers. Patient: Isai Cottrell  Patient : 1950   MRN: [id number]  Reason for Admission: Robotic assisted laparoscopic right-sided radical nephrectomy  Discharge Date: 23 RARS: Readmission Risk Score: 7.7      Needs to be reviewed by the provider   Additional needs identified to be addressed with provider: No  none             Method of communication with provider: none. Spoke with Isai Cottrell who reported that she is doing very well. Patient reported that incisions look good and almost healed. Patient stated that appetite and fluid intake is good. And no issues with bowel or bladder. Patient reported that PCP and surgeon is taking very good care of her. Patient is a member of [employer] program and reported that she was having issues with the computer taking her information. Writer advised patient to call the help desk number that came with the equipment and patient v/u.and stated that she will. Patient denied any other concerns or issues at this time. Patient agreeable to continue follow up calls. Addressed changes since last contact:  none  Discussed follow-up appointments. If no appointment was previously scheduled, appointment scheduling offered: Yes. Is follow up appointment scheduled within 7 days of discharge? Yes. Follow Up  Future Appointments   Date Time Provider Víctor Porter   3/28/2023  3:30 PM MD NIKOLAS Segovia [place] - DYD     Non-[facility] follow up appointment(s): na    LPN Care Coordinator reviewed medical action plan with patient and discussed any barriers to care and/or understanding of plan of care after discharge.  Discussed appropriate site of care based on symptoms and resources available to patient including: PCP  Specialist  When to call 911. The patient agrees to contact the PCP office for questions related to their healthcare. Advance Care Planning   The patient has the following advanced directives on file:  Advance Directives       Power of [address] Will ACP-Advance Directive ACP-Power of     Not on File Not on File Not on File Not on File            The patient has appointed the following active healthcare agents:    Primary Decision Maker: Annemarie Yoonleo - Child - [phone]       Patients top risk factors for readmission: medical condition-. Patient Active Problem List   Diagnosis    Hyperlipidemia    Polyp of colon    COPD (chronic obstructive pulmonary disease) (HCC)    Pleural plaque with presence of asbestos    GERD (gastroesophageal reflux disease)    Hip mass    Osteoporosis    Intention tremor    Constipation    Closed fracture of right distal radius    Cataract, nuclear sclerotic senile, left    Cystoid macular degeneration of retina    Lens replaced by other means    Epiretinal membrane    Vitreomacular adhesion, left eye    Vitreous degeneration, left eye    Current occasional smoker    Right renal mass    Bilateral malignant neoplasm of breast in female, unspecified estrogen receptor status, unspecified site of breast ([facility] [address]    Renal mass, right      Interventions to address risk factors: Assessment and support for treatment adherence and medication management-. Offered patient enrollment in the Remote Patient Monitoring (RPM) program for in-home monitoring:  Patient is already a member .      Care Transitions Subsequent and Final Call    Subsequent and Final Calls  Do you have any ongoing symptoms?: No  Have your medications changed?: No  Do you have any questions related to your medications?: No  Do you currently have any active services?: No  Do you have any needs or concerns that I can assist you with?: No  Identified Barriers: None  Care Transitions Interventions  No Identified Needs  Other Interventions:             LPN Care Coordinator provided contact information for future needs. Plan for follow-up call in 5-7 days based on severity of symptoms and risk factors.   Plan for next call: symptom management-,  self management-.    Sekou Gibbs LPN

## 2023-03-03 NOTE — CARE COORDINATION
Remote Patient Monitoring Note      Date/Time:  3/3/2023 1:15 PM    LPN reviewed patients reported daily Remote Patient Monitoring metrics. All reported metrics are within alert parameters. Plan/Follow Up:  Will continue to review, monitor and address alerts with follow up based on severity of symptoms and risk factors  patient called and was not able to connect to tablet LPN called tech support and she connected tablet and did an update LPN stayed on phone with patient and all vitals WNL no concerns ---- Current Patient Metrics ---- Blood Pressure: 124/83, 91bpm Pulseox: 99%, 89bpm Survey: C Weight: 129.6lbs Note Created at: 03/03/2023 01:18 PM ET ---- Time-Spent: 10 minutes 0 seconds

## 2023-03-06 ENCOUNTER — CARE COORDINATION (OUTPATIENT)
Dept: CARE COORDINATION | Age: 73
End: 2023-03-06

## 2023-03-06 NOTE — CARE COORDINATION
Remote Patient Monitoring Note      Date/Time:  3/6/2023 8:12 AM    CCSS reviewed patients reported daily Remote Patient Monitoring metrics. All reported metrics are within alert parameters. Plan/Follow Up:  Will continue to review, monitor and address alerts with follow up based on severity of symptoms and risk factors   Current Patient Metrics ---- Blood Pressure: 128/89, 100bpm Pulseox: 98%, 97bpm Survey: - Weight: 126.0lbs Note Created at: 03/06/2023 08:12 AM ET ---- Time-Spent: 2 minutes 0 seconds

## 2023-03-07 ENCOUNTER — CARE COORDINATION (OUTPATIENT)
Dept: CARE COORDINATION | Age: 73
End: 2023-03-07

## 2023-03-07 DIAGNOSIS — G43.909 MIGRAINE WITHOUT STATUS MIGRAINOSUS, NOT INTRACTABLE, UNSPECIFIED MIGRAINE TYPE: ICD-10-CM

## 2023-03-07 RX ORDER — SUMATRIPTAN 100 MG/1
TABLET, FILM COATED ORAL
Qty: 9 TABLET | Refills: 5 | OUTPATIENT
Start: 2023-03-07

## 2023-03-07 NOTE — TELEPHONE ENCOUNTER
Medication and Quantity requested:      SUMAtriptan (IMITREX) 100 MG tablet [[id number]]     AND    Metromidazolie  0.75%  Last Visit    02/14/2023    Pharmacy and phone number updated in EPIC:  yes    Meds by mail

## 2023-03-07 NOTE — CARE COORDINATION
Remote Patient Monitoring Note      Date/Time:  3/7/2023 9:52 AM    CCSS reviewed patients reported daily Remote Patient Monitoring metrics. All reported metrics are within alert parameters. Plan/Follow Up:  Will continue to review, monitor and address alerts with follow up based on severity of symptoms and risk factors  Current Patient Metrics ---- Blood Pressure: 124/91, 91bpm Pulseox: 99%, 91bpm Survey: - Weight: 127.2lbs Note Created at: 03/07/2023 09:52 AM ET ---- Time-Spent: 2 minutes 0 seconds

## 2023-03-08 ENCOUNTER — CARE COORDINATION (OUTPATIENT)
Dept: CARE COORDINATION | Age: 73
End: 2023-03-08

## 2023-03-08 NOTE — CARE COORDINATION
Remote Patient Monitoring Note      Date/Time:  3/8/2023 8:46 AM    CCSS reviewed patients reported daily Remote Patient Monitoring metrics. All reported metrics are within alert parameters. Plan/Follow Up:  Will continue to review, monitor and address alerts with follow up based on severity of symptoms and risk factors   Current Patient Metrics ---- Blood Pressure: 139/93, 94bpm Pulseox: 98%, 87bpm Survey: - Weight: 126.6lbs Note Created at: 03/08/2023 08:47 AM ET ---- Time-Spent: 2 minutes 0 seconds

## 2023-03-09 ENCOUNTER — CARE COORDINATION (OUTPATIENT)
Dept: CARE COORDINATION | Age: 73
End: 2023-03-09

## 2023-03-09 NOTE — CARE COORDINATION
Remote Patient Monitoring Note      Date/Time:  3/9/2023 9:21 AM    CCSS reviewed patients reported daily Remote Patient Monitoring metrics. All reported metrics are within alert parameters. Plan/Follow Up:  Will continue to review, monitor and address alerts with follow up based on severity of symptoms and risk factors  - Current Patient Metrics ---- Blood Pressure: 128/90, 106bpm Pulseox: 98%, 102bpm Survey: - Weight: 126.6lbs Note Created at: 03/09/2023 09:21 AM ET ---- Time-Spent: 2 minutes 0 seconds

## 2023-03-10 ENCOUNTER — CARE COORDINATION (OUTPATIENT)
Dept: CASE MANAGEMENT | Age: 73
End: 2023-03-10

## 2023-03-10 NOTE — CARE COORDINATION
[facility] Care Transitions Follow Up Call    Patient Current Location:  Home: [address] [place] [address] [address] contacted the patient by telephone to follow up after admission on 2023. Verified name and  with patient as identifiers. Patient: Gideon Richards  Patient : 1950   MRN: [id number]  Reason for Admission: Robotic assisted laparoscopic right-sided radical nephrectomy  Discharge Date: 23 RARS: Readmission Risk Score: 7.7      Needs to be reviewed by the provider   Additional needs identified to be addressed with provider: No  none             Method of communication with provider: none. Patient reports she is doing wonderfully well. No problems after her surgery. Incision sites have healed well. Appetite and fluid intake is good. Denies pain, fever or chills. No problems with urinating or bm's. Patient informed this is the final call. Any medical concerns contact her PCP. No further outreach scheduled. Addressed changes since last contact:  none  Discussed follow-up appointments. If no appointment was previously scheduled, appointment scheduling offered: No.   Is follow up appointment scheduled within 7 days of discharge? Yes. Follow Up  Future Appointments   Date Time Provider Víctor Porter   3/28/2023  3:30 PM MD NIKOLAS Rodriges [place] - DYD     Non-[facility] follow up appointment(s):       Advance Care Planning:   not on file. Offered patient enrollment in the Remote Patient Monitoring (RPM) program for in-home monitoring: patient already enrolled.      Care Transitions Subsequent and Final Call    Subsequent and Final Calls  Do you have any ongoing symptoms?: No  Have your medications changed?: No  Do you have any questions related to your medications?: No  Do you currently have any active services?: No  Do you have any needs or concerns that I can assist you with?: No  Identified Barriers: None  Care Transitions Interventions  Other Interventions:             LPN Care Coordinator provided contact information for future needs. No further follow-up call indicated based on severity of symptoms and risk factors.   Plan for next call: referral to ambulatory care manager-     Desmond Ibarra LPN

## 2023-03-10 NOTE — CARE COORDINATION
Remote Patient Monitoring Note      Date/Time:  3/10/2023 8:58 AM    LPN reviewed patients reported daily Remote Patient Monitoring metrics. All reported metrics are within alert parameters. Plan/Follow Up:  Will continue to review, monitor and address alerts with follow up based on severity of symptoms and risk factors  Current Patient Metrics ---- Blood Pressure: 119/82, 108bpm Pulseox: 98%, 105bpm Survey: - Weight: 127.8lbs Note Created at: 03/10/2023 08:59 AM ET ---- Time-Spent: 2 minutes 0 seconds

## 2023-03-13 ENCOUNTER — CARE COORDINATION (OUTPATIENT)
Dept: CARE COORDINATION | Age: 73
End: 2023-03-13

## 2023-03-13 NOTE — CARE COORDINATION
CCSS reviewed patients reported daily Remote Patient Monitoring metrics.   All reported metrics are within alert parameters.  Plan/Follow Up: Will continue to review, monitor and address alerts with follow up based on severity of symptoms and risk factors      Current Patient Metrics ---- Blood Pressure: 124/90, 99bpm Pulseox: 99%, 95bpm Survey: C Weight: 127.0lbs Note Created at: 03/13/2023 12:51 PM CT ---- Time-Spent: 2 minutes 0 seconds    MARCELL Stevens  Care Coordination    [facility]   Cell: [phone]

## 2023-03-14 ENCOUNTER — CARE COORDINATION (OUTPATIENT)
Dept: CASE MANAGEMENT | Age: 73
End: 2023-03-14

## 2023-03-14 NOTE — CARE COORDINATION
Remote Alert Monitoring Note      Date/Time:  3/14/2023 10:14 AM  Patient Current Location: Home: [address] [place] [address]    LPN contacted patient by telephone regarding yellow alert received for blood pressure heart rate (114). Verified patients name and  as identifiers. Background: COPD  Refer to 911 immediately if:  Patient unresponsive or unable to provide history  Change in cognition or sudden confusion  Patient unable to respond in complete sentences  Intense chest pain/tightness  Any concern for any clinical emergency  Red Alert: Provider response time of 1 hr required for any red alert requiring intervention  Yellow Alert: Provider response time of 3hr required for any escalated yellow alert    HR Triage  Are you having any Chest Pain? no   Are you having any Shortness of Breath? no   Are you having any dizziness? no   Are you feeling more fatigued or tired than normal? no   Are you having any other health concerns or issues? no       Clinical Interventions: Reviewed and followed up on alerts and treatments-Spoke to patient she states she is doing okay she is going to neurologist this am she states she had a slip on the ice this am but did not fall thinks she had a scare and her HR elevated, she denies concerns recheck of BP while on phone with LPN was WNL       Plan/Follow Up: Will continue to review, monitor and address alerts with follow up based on severity of symptoms and risk factors. Current Patient Metrics ---- Blood Pressure: 132/88, 94bpm Pulseox: 96%, 105bpm Survey: C Weight: 125.2lbs Note Created at: 2023 10:17 AM ET ---- Time-Spent: 7 minutes 0 seconds

## 2023-03-15 ENCOUNTER — CARE COORDINATION (OUTPATIENT)
Dept: CARE COORDINATION | Age: 73
End: 2023-03-15

## 2023-03-15 NOTE — CARE COORDINATION
Remote Patient Monitoring Note      Date/Time:  3/15/2023 9:48 AM    CCSS reviewed patients reported daily Remote Patient Monitoring metrics. All reported metrics are within alert parameters. Plan/Follow Up:  Will continue to review, monitor and address alerts with follow up based on severity of symptoms and risk factors   Current Patient Metrics ---- Blood Pressure: 117/85, 97bpm Pulseox: 98%, 86bpm Survey: - Weight: 126.2lbs Note Created at: 03/15/2023 09:48 AM ET ---- Time-Spent: 2 minutes 0 seconds

## 2023-03-16 ENCOUNTER — CARE COORDINATION (OUTPATIENT)
Dept: CASE MANAGEMENT | Age: 73
End: 2023-03-16

## 2023-03-16 NOTE — CARE COORDINATION
Date/Time:  3/16/2023 9:38 AM  LPN attempted to reach patient by telephone regarding yellow alert in remote symptom monitoring program. Left HIPPA compliant message requesting a return call. Will attempt to reach patient again. Remote Alert Monitoring Note      Date/Time:  3/16/2023 10:31 AM  Patient Current Location: Home: [address] [place] [address]    LPN contacted patient by telephone regarding yellow alert received for blood pressure heart rate (115). Verified patients name and  as identifiers. Background: COPD  Refer to 911 immediately if:  Patient unresponsive or unable to provide history  Change in cognition or sudden confusion  Patient unable to respond in complete sentences  Intense chest pain/tightness  Any concern for any clinical emergency  Red Alert: Provider response time of 1 hr required for any red alert requiring intervention  Yellow Alert: Provider response time of 3hr required for any escalated yellow alert    HR Triage  Are you having any Chest Pain? no   Are you having any Shortness of Breath? no   Are you having any dizziness? no   Are you feeling more fatigued or tired than normal? no   Are you having any other health concerns or issues? no       Clinical Interventions: Reviewed and followed up on alerts and treatments-Spoke to patient she states she is doing well today denies chest pain, SOB, dizziness blurred vision, recheck of BP is now WNL no concerns      Plan/Follow Up: Will continue to review, monitor and address alerts with follow up based on severity of symptoms and risk factors.

## 2023-03-17 ENCOUNTER — CARE COORDINATION (OUTPATIENT)
Dept: CASE MANAGEMENT | Age: 73
End: 2023-03-17

## 2023-03-17 NOTE — CARE COORDINATION
Remote Alert Monitoring Note      Date/Time:  3/17/2023 9:22 AM  Patient Current Location: Home: [address] [address]    LPN contacted patient by telephone regarding red alert received for blood pressure heart rate (125). Verified patients name and  as identifiers. Background: COPD  Refer to 911 immediately if:  Patient unresponsive or unable to provide history  Change in cognition or sudden confusion  Patient unable to respond in complete sentences  Intense chest pain/tightness  Any concern for any clinical emergency  Red Alert: Provider response time of 1 hr required for any red alert requiring intervention  Yellow Alert: Provider response time of 3hr required for any escalated yellow alert    HR Triage  Are you having any Chest Pain? no   Are you having any Shortness of Breath? no   Are you having any dizziness? no   Are you feeling more fatigued or tired than normal? no   Are you having any other health concerns or issues? no       Clinical Interventions: Reviewed and followed up on alerts and treatments-spoke to patient she was doing some work outside moving flower beds before she checked her BP, she just left home for an appointment has no concerns and will recheck BP when she gets back. Plan/Follow Up: Will continue to review, monitor and address alerts with follow up based on severity of symptoms and risk factors. UPDATE LPN reviewed new BP HR is still elevated but lowed to yellow alert. Called patient to recheck she is not home again .  ---- Current Patient Metrics ---- Blood Pressure: 120/89, 125bpm Pulseox: 97%, 117bpm Survey: - Weight: 124.8lbs Note Created at: 2023 09:24 AM ET ---- Time-Spent: 8 minutes 0 seconds

## 2023-03-20 ENCOUNTER — CARE COORDINATION (OUTPATIENT)
Dept: CARE COORDINATION | Age: 73
End: 2023-03-20

## 2023-03-20 SDOH — ECONOMIC STABILITY: FOOD INSECURITY: WITHIN THE PAST 12 MONTHS, YOU WORRIED THAT YOUR FOOD WOULD RUN OUT BEFORE YOU GOT MONEY TO BUY MORE.: NEVER TRUE

## 2023-03-20 SDOH — ECONOMIC STABILITY: INCOME INSECURITY: IN THE LAST 12 MONTHS, WAS THERE A TIME WHEN YOU WERE NOT ABLE TO PAY THE MORTGAGE OR RENT ON TIME?: NO

## 2023-03-20 SDOH — ECONOMIC STABILITY: HOUSING INSECURITY: IN THE LAST 12 MONTHS, HOW MANY PLACES HAVE YOU LIVED?: 1

## 2023-03-20 SDOH — HEALTH STABILITY: PHYSICAL HEALTH: ON AVERAGE, HOW MANY DAYS PER WEEK DO YOU ENGAGE IN MODERATE TO STRENUOUS EXERCISE (LIKE A BRISK WALK)?: 5 DAYS

## 2023-03-20 SDOH — SOCIAL STABILITY: SOCIAL NETWORK
IN A TYPICAL WEEK, HOW MANY TIMES DO YOU TALK ON THE PHONE WITH FAMILY, FRIENDS, OR NEIGHBORS?: MORE THAN THREE TIMES A WEEK

## 2023-03-20 SDOH — ECONOMIC STABILITY: TRANSPORTATION INSECURITY
IN THE PAST 12 MONTHS, HAS THE LACK OF TRANSPORTATION KEPT YOU FROM MEDICAL APPOINTMENTS OR FROM GETTING MEDICATIONS?: NO

## 2023-03-20 SDOH — HEALTH STABILITY: PHYSICAL HEALTH: ON AVERAGE, HOW MANY MINUTES DO YOU ENGAGE IN EXERCISE AT THIS LEVEL?: 20 MIN

## 2023-03-20 SDOH — SOCIAL STABILITY: SOCIAL NETWORK: HOW OFTEN DO YOU ATTEND CHURCH OR RELIGIOUS SERVICES?: 1 TO 4 TIMES PER YEAR

## 2023-03-20 SDOH — SOCIAL STABILITY: SOCIAL NETWORK
DO YOU BELONG TO ANY CLUBS OR ORGANIZATIONS SUCH AS CHURCH GROUPS UNIONS, FRATERNAL OR ATHLETIC GROUPS, OR SCHOOL GROUPS?: YES

## 2023-03-20 SDOH — ECONOMIC STABILITY: HOUSING INSECURITY
IN THE LAST 12 MONTHS, WAS THERE A TIME WHEN YOU DID NOT HAVE A STEADY PLACE TO SLEEP OR SLEPT IN A SHELTER (INCLUDING NOW)?: NO

## 2023-03-20 SDOH — ECONOMIC STABILITY: FOOD INSECURITY: WITHIN THE PAST 12 MONTHS, THE FOOD YOU BOUGHT JUST DIDN'T LAST AND YOU DIDN'T HAVE MONEY TO GET MORE.: NEVER TRUE

## 2023-03-20 SDOH — SOCIAL STABILITY: SOCIAL NETWORK: ARE YOU MARRIED, WIDOWED, DIVORCED, SEPARATED, NEVER MARRIED, OR LIVING WITH A PARTNER?: WIDOWED

## 2023-03-20 SDOH — ECONOMIC STABILITY: INCOME INSECURITY: HOW HARD IS IT FOR YOU TO PAY FOR THE VERY BASICS LIKE FOOD, HOUSING, MEDICAL CARE, AND HEATING?: NOT HARD AT ALL

## 2023-03-20 SDOH — HEALTH STABILITY: MENTAL HEALTH: HOW OFTEN DO YOU HAVE A DRINK CONTAINING ALCOHOL?: MONTHLY OR LESS

## 2023-03-20 SDOH — ECONOMIC STABILITY: TRANSPORTATION INSECURITY
IN THE PAST 12 MONTHS, HAS LACK OF TRANSPORTATION KEPT YOU FROM MEETINGS, WORK, OR FROM GETTING THINGS NEEDED FOR DAILY LIVING?: NO

## 2023-03-20 SDOH — SOCIAL STABILITY: SOCIAL NETWORK: HOW OFTEN DO YOU ATTENT MEETINGS OF THE CLUB OR ORGANIZATION YOU BELONG TO?: 1 TO 4 TIMES PER YEAR

## 2023-03-20 SDOH — HEALTH STABILITY: MENTAL HEALTH: HOW MANY STANDARD DRINKS CONTAINING ALCOHOL DO YOU HAVE ON A TYPICAL DAY?: 1 OR 2

## 2023-03-20 SDOH — HEALTH STABILITY: MENTAL HEALTH
STRESS IS WHEN SOMEONE FEELS TENSE, NERVOUS, ANXIOUS, OR CAN'T SLEEP AT NIGHT BECAUSE THEIR MIND IS TROUBLED. HOW STRESSED ARE YOU?: ONLY A LITTLE

## 2023-03-20 SDOH — SOCIAL STABILITY: SOCIAL NETWORK: HOW OFTEN DO YOU GET TOGETHER WITH FRIENDS OR RELATIVES?: MORE THAN THREE TIMES A WEEK

## 2023-03-20 NOTE — CARE COORDINATION
- DYD    and   COPD Assessment    Does the patient understand envrionmental exposure?: No  Is the patient able to verbalize Rescue vs. Long Acting medications?: Yes  Does the patient have a nebulizer?: Yes  Does the patient use a space with inhaled medications?: No     No patient-reported symptoms         Symptoms:

## 2023-03-27 ENCOUNTER — CARE COORDINATION (OUTPATIENT)
Dept: CASE MANAGEMENT | Age: 73
End: 2023-03-27

## 2023-03-27 NOTE — CARE COORDINATION
Date/Time:  3/27/2023 9:34 AM  LPN attempted to reach patient by telephone regarding yellow alert BP heart rate in remote symptom monitoring program. Left HIPPA compliant message requesting a return call. Will attempt to reach patient again.

## 2023-03-28 ENCOUNTER — OFFICE VISIT (OUTPATIENT)
Dept: FAMILY MEDICINE CLINIC | Age: 73
End: 2023-03-28
Payer: MEDICARE

## 2023-03-28 VITALS
BODY MASS INDEX: 23.23 KG/M2 | DIASTOLIC BLOOD PRESSURE: 80 MMHG | HEART RATE: 76 BPM | SYSTOLIC BLOOD PRESSURE: 120 MMHG | OXYGEN SATURATION: 98 % | WEIGHT: 127 LBS

## 2023-03-28 DIAGNOSIS — N28.89 RIGHT RENAL MASS: ICD-10-CM

## 2023-03-28 DIAGNOSIS — L30.9 DERMATITIS: ICD-10-CM

## 2023-03-28 DIAGNOSIS — C50.911 BILATERAL MALIGNANT NEOPLASM OF BREAST IN FEMALE, UNSPECIFIED ESTROGEN RECEPTOR STATUS, UNSPECIFIED SITE OF BREAST (HCC): ICD-10-CM

## 2023-03-28 DIAGNOSIS — E78.5 HYPERLIPIDEMIA, UNSPECIFIED HYPERLIPIDEMIA TYPE: ICD-10-CM

## 2023-03-28 DIAGNOSIS — C50.912 BILATERAL MALIGNANT NEOPLASM OF BREAST IN FEMALE, UNSPECIFIED ESTROGEN RECEPTOR STATUS, UNSPECIFIED SITE OF BREAST (HCC): ICD-10-CM

## 2023-03-28 DIAGNOSIS — M81.8 OTHER OSTEOPOROSIS WITHOUT CURRENT PATHOLOGICAL FRACTURE: ICD-10-CM

## 2023-03-28 DIAGNOSIS — J43.9 PULMONARY EMPHYSEMA, UNSPECIFIED EMPHYSEMA TYPE (HCC): Primary | ICD-10-CM

## 2023-03-28 PROCEDURE — 3017F COLORECTAL CA SCREEN DOC REV: CPT | Performed by: FAMILY MEDICINE

## 2023-03-28 PROCEDURE — 1036F TOBACCO NON-USER: CPT | Performed by: FAMILY MEDICINE

## 2023-03-28 PROCEDURE — G8484 FLU IMMUNIZE NO ADMIN: HCPCS | Performed by: FAMILY MEDICINE

## 2023-03-28 PROCEDURE — G8399 PT W/DXA RESULTS DOCUMENT: HCPCS | Performed by: FAMILY MEDICINE

## 2023-03-28 PROCEDURE — 1090F PRES/ABSN URINE INCON ASSESS: CPT | Performed by: FAMILY MEDICINE

## 2023-03-28 PROCEDURE — G8420 CALC BMI NORM PARAMETERS: HCPCS | Performed by: FAMILY MEDICINE

## 2023-03-28 PROCEDURE — G8427 DOCREV CUR MEDS BY ELIG CLIN: HCPCS | Performed by: FAMILY MEDICINE

## 2023-03-28 PROCEDURE — 1123F ACP DISCUSS/DSCN MKR DOCD: CPT | Performed by: FAMILY MEDICINE

## 2023-03-28 PROCEDURE — 99214 OFFICE O/P EST MOD 30 MIN: CPT | Performed by: FAMILY MEDICINE

## 2023-03-28 PROCEDURE — 3023F SPIROM DOC REV: CPT | Performed by: FAMILY MEDICINE

## 2023-03-28 RX ORDER — ALENDRONATE SODIUM 70 MG/1
70 TABLET ORAL
Qty: 12 TABLET | Refills: 3 | Status: SHIPPED | OUTPATIENT
Start: 2023-03-28

## 2023-03-28 RX ORDER — MECLIZINE HYDROCHLORIDE 25 MG/1
25 TABLET ORAL 3 TIMES DAILY PRN
Qty: 10 TABLET | Refills: 0 | Status: SHIPPED | OUTPATIENT
Start: 2023-03-28 | End: 2023-04-07

## 2023-03-28 RX ORDER — ALENDRONATE SODIUM 70 MG/1
70 TABLET ORAL
Qty: 12 TABLET | Refills: 3 | Status: CANCELLED | OUTPATIENT
Start: 2023-03-28

## 2023-03-28 RX ORDER — DESOXIMETASONE 2.5 MG/G
CREAM TOPICAL
Qty: 60 G | Refills: 1 | Status: SHIPPED | OUTPATIENT
Start: 2023-03-28

## 2023-03-28 NOTE — PROGRESS NOTES
Chrissie Moe is a 74 y.o. female. HPI:here for complex medial visit with her   S/p right nephrectomy 2-7-23  Dong well  Occ dizziness  Rash on lower abd, legs that is itchy-no vesicles no known exposures no one else in the house has the same rash  Past do not have fleas  Saw oncologist - no further treatment necessary  Copd  stable on inhalers  Also having occasional vertigo without any nausea vomiting lasting short amounts of time  Meds, vitamins and allergies reviewed with pt    ROS: No TIA's or unusual headaches, no dysphagia. No prolonged cough. No dyspnea or chest pain on exertion. No abdominal pain, change in bowel habits, black or bloody stools. No urinary tract symptoms. No new or unusual musculoskeletal symptoms. Prior to Visit Medications    Medication Sig Taking? Authorizing Provider   desoximetasone (TOPICORT) 0.25 % cream Apply topically 2 times daily.  Yes Sandy Cohen MD   alendronate (FOSAMAX) 70 MG tablet Take 1 tablet by mouth every 7 days Yes Sandy Cohen MD   SUMAtriptan (IMITREX) 100 MG tablet TAKE 1 TABLET BY MOUTH FOR 1 DOSE AS NEEDED FOR MIGRAINE Yes Sandy Cohen MD   atorvastatin (LIPITOR) 20 MG tablet Take 1 tablet by mouth daily Yes Sandy Cohen MD   albuterol sulfate HFA (PROVENTIL;VENTOLIN;PROAIR) 108 (90 Base) MCG/ACT inhaler Inhale 2 puffs into the lungs every 6 hours as needed for Wheezing Yes Sandy Cohen MD   budesonide-formoterol (SYMBICORT) 160-4.5 MCG/ACT AERO Inhale 2 puffs into the lungs 2 times daily Yes Sandy Cohen MD   senna-docusate (PERICOLACE) 8.6-50 MG per tablet Take 1 tablet by mouth 2 times daily Yes Sima Moreno MD   primidone (MYSOLINE) 50 MG tablet Take 1 tablet in the morning, 1 tablet at noon and 2 tablets at night Yes Felicia Holcomb MD   aspirin 81 MG EC tablet Take 81 mg by mouth daily Yes Historical Provider, MD   Cholecalciferol (VITAMIN D PO) Take by mouth Yes Historical

## 2023-04-17 ENCOUNTER — CARE COORDINATION (OUTPATIENT)
Dept: CASE MANAGEMENT | Age: 73
End: 2023-04-17

## 2023-04-19 ENCOUNTER — CARE COORDINATION (OUTPATIENT)
Dept: CARE COORDINATION | Age: 73
End: 2023-04-19

## 2023-04-19 VITALS
OXYGEN SATURATION: 98 % | HEART RATE: 70 BPM | SYSTOLIC BLOOD PRESSURE: 138 MMHG | BODY MASS INDEX: 22.75 KG/M2 | WEIGHT: 124.4 LBS | DIASTOLIC BLOOD PRESSURE: 89 MMHG

## 2023-04-19 DIAGNOSIS — J43.9 PULMONARY EMPHYSEMA, UNSPECIFIED EMPHYSEMA TYPE (HCC): Primary | ICD-10-CM

## 2023-04-19 NOTE — CARE COORDINATION
CCSS placed call to patient to arrange RPM kit  through [facility]. Reviewed with patient how to pack equipment in original packing. Verified patient's availability to schedule UPS  time. UPS  time requested. Anticipated  date range 4-7 business days.

## 2023-04-19 NOTE — PROGRESS NOTES
4/19/23 5:51 PM     Remote Patient Order Discontinued    Received request from Terrace Seip, RN  to discontinue order for remote patient monitoring of COPD and order completed.      Leodan Bello DNP, FNP-C, Remote Patient Monitoring NP, () [phone]

## 2023-05-15 DIAGNOSIS — L30.9 DERMATITIS: ICD-10-CM

## 2023-05-15 RX ORDER — DESOXIMETASONE 2.5 MG/G
CREAM TOPICAL
Qty: 60 G | Refills: 1 | Status: SHIPPED | OUTPATIENT
Start: 2023-05-15

## 2023-05-15 NOTE — TELEPHONE ENCOUNTER
Medication and Quantity requested:      desoximetasone (TOPICORT) 0.25 % cream [[id number]]       Last Visit  03/28/2023        Pharmacy and phone number updated in [facility]:  yes      James

## 2023-05-15 NOTE — TELEPHONE ENCOUNTER
Medication:   Requested Prescriptions     Pending Prescriptions Disp Refills    desoximetasone (TOPICORT) 0.25 % cream 60 g 1     Sig: Apply topically 2 times daily. Last Filled:  3/28/2023    Patient Phone Number: [phone] (home)     Last appt: 3/28/2023   Next appt: 9/28/2023    Last OARRS: No flowsheet data found.

## 2023-06-06 ENCOUNTER — HOSPITAL ENCOUNTER (OUTPATIENT)
Dept: CT IMAGING | Age: 73
Discharge: HOME | End: 2023-06-06
Payer: MEDICARE

## 2023-06-06 ENCOUNTER — HOSPITAL ENCOUNTER (OUTPATIENT)
Dept: ULTRASOUND IMAGING | Age: 73
Discharge: HOME | End: 2023-06-06
Payer: MEDICARE

## 2023-06-06 DIAGNOSIS — R93.5 ABNORMAL CT SCAN, PELVIS: ICD-10-CM

## 2023-06-06 DIAGNOSIS — C64.1 MALIGNANT NEOPLASM OF RIGHT KIDNEY, EXCEPT RENAL PELVIS (HCC): ICD-10-CM

## 2023-06-06 PROCEDURE — 74176 CT ABD & PELVIS W/O CONTRAST: CPT

## 2023-06-06 PROCEDURE — 76856 US EXAM PELVIC COMPLETE: CPT

## 2023-09-28 ENCOUNTER — OFFICE VISIT (OUTPATIENT)
Dept: FAMILY MEDICINE CLINIC | Age: 73
End: 2023-09-28

## 2023-09-28 VITALS
SYSTOLIC BLOOD PRESSURE: 130 MMHG | OXYGEN SATURATION: 95 % | DIASTOLIC BLOOD PRESSURE: 81 MMHG | HEART RATE: 73 BPM | BODY MASS INDEX: 21.77 KG/M2 | WEIGHT: 119 LBS

## 2023-09-28 DIAGNOSIS — L30.9 DERMATITIS: ICD-10-CM

## 2023-09-28 DIAGNOSIS — M81.8 OTHER OSTEOPOROSIS WITHOUT CURRENT PATHOLOGICAL FRACTURE: ICD-10-CM

## 2023-09-28 DIAGNOSIS — Z00.00 ANNUAL PHYSICAL EXAM: ICD-10-CM

## 2023-09-28 DIAGNOSIS — N63.10 MASS OF RIGHT BREAST, UNSPECIFIED QUADRANT: ICD-10-CM

## 2023-09-28 DIAGNOSIS — K21.9 GASTROESOPHAGEAL REFLUX DISEASE WITHOUT ESOPHAGITIS: ICD-10-CM

## 2023-09-28 DIAGNOSIS — J43.9 PULMONARY EMPHYSEMA, UNSPECIFIED EMPHYSEMA TYPE (HCC): ICD-10-CM

## 2023-09-28 DIAGNOSIS — G43.909 MIGRAINE WITHOUT STATUS MIGRAINOSUS, NOT INTRACTABLE, UNSPECIFIED MIGRAINE TYPE: ICD-10-CM

## 2023-09-28 DIAGNOSIS — G25.0 BENIGN ESSENTIAL TREMOR: ICD-10-CM

## 2023-09-28 DIAGNOSIS — R53.83 FATIGUE, UNSPECIFIED TYPE: ICD-10-CM

## 2023-09-28 DIAGNOSIS — Z87.891 PERSONAL HISTORY OF TOBACCO USE: ICD-10-CM

## 2023-09-28 DIAGNOSIS — C50.911 BILATERAL MALIGNANT NEOPLASM OF BREAST IN FEMALE, UNSPECIFIED ESTROGEN RECEPTOR STATUS, UNSPECIFIED SITE OF BREAST (HCC): ICD-10-CM

## 2023-09-28 DIAGNOSIS — E78.5 HYPERLIPIDEMIA, UNSPECIFIED HYPERLIPIDEMIA TYPE: ICD-10-CM

## 2023-09-28 DIAGNOSIS — C50.911 NEOPLASM OF RIGHT BREAST, PRIMARY TUMOR STAGING CATEGORY T4B: ULCERATION AND/OR IPSILATERAL SATELLITE NODULES AND/OR EDEMA (INCLUDING PEAU D'ORANGE) OF SKIN, EXCLUDING INFLAMMATORY CARCINOMA (HCC): ICD-10-CM

## 2023-09-28 DIAGNOSIS — G25.2 INTENTION TREMOR: ICD-10-CM

## 2023-09-28 DIAGNOSIS — Z00.00 MEDICARE ANNUAL WELLNESS VISIT, SUBSEQUENT: Primary | ICD-10-CM

## 2023-09-28 DIAGNOSIS — Z23 FLU VACCINE NEED: ICD-10-CM

## 2023-09-28 DIAGNOSIS — C50.912 BILATERAL MALIGNANT NEOPLASM OF BREAST IN FEMALE, UNSPECIFIED ESTROGEN RECEPTOR STATUS, UNSPECIFIED SITE OF BREAST (HCC): ICD-10-CM

## 2023-09-28 LAB
ALBUMIN SERPL-MCNC: 4.4 G/DL (ref 3.4–5)
ALBUMIN/GLOB SERPL: 2 {RATIO} (ref 1.1–2.2)
ALP SERPL-CCNC: 81 U/L (ref 40–129)
ALT SERPL-CCNC: 10 U/L (ref 10–40)
ANION GAP SERPL CALCULATED.3IONS-SCNC: 11 MMOL/L (ref 3–16)
AST SERPL-CCNC: 13 U/L (ref 15–37)
BASOPHILS # BLD: 0 K/UL (ref 0–0.2)
BASOPHILS NFR BLD: 0.7 %
BILIRUB SERPL-MCNC: 0.3 MG/DL (ref 0–1)
BUN SERPL-MCNC: 12 MG/DL (ref 7–20)
CALCIUM SERPL-MCNC: 9.1 MG/DL (ref 8.3–10.6)
CHLORIDE SERPL-SCNC: 104 MMOL/L (ref 99–110)
CHOLEST SERPL-MCNC: 182 MG/DL (ref 0–199)
CO2 SERPL-SCNC: 26 MMOL/L (ref 21–32)
CREAT SERPL-MCNC: 1 MG/DL (ref 0.6–1.2)
DEPRECATED RDW RBC AUTO: 14 % (ref 12.4–15.4)
EOSINOPHIL # BLD: 0 K/UL (ref 0–0.6)
EOSINOPHIL NFR BLD: 0.7 %
GFR SERPLBLD CREATININE-BSD FMLA CKD-EPI: 59 ML/MIN/{1.73_M2}
GLUCOSE P FAST SERPL-MCNC: 80 MG/DL (ref 70–99)
HCT VFR BLD AUTO: 41.7 % (ref 36–48)
HDLC SERPL-MCNC: 67 MG/DL (ref 40–60)
HGB BLD-MCNC: 13.9 G/DL (ref 12–16)
LDL CHOLESTEROL CALCULATED: 95 MG/DL
LYMPHOCYTES # BLD: 1.5 K/UL (ref 1–5.1)
LYMPHOCYTES NFR BLD: 26.5 %
MCH RBC QN AUTO: 31.8 PG (ref 26–34)
MCHC RBC AUTO-ENTMCNC: 33.4 G/DL (ref 31–36)
MCV RBC AUTO: 95.3 FL (ref 80–100)
MONOCYTES # BLD: 0.3 K/UL (ref 0–1.3)
MONOCYTES NFR BLD: 5.4 %
NEUTROPHILS # BLD: 3.6 K/UL (ref 1.7–7.7)
NEUTROPHILS NFR BLD: 66.7 %
PLATELET # BLD AUTO: 237 K/UL (ref 135–450)
PMV BLD AUTO: 8.9 FL (ref 5–10.5)
POTASSIUM SERPL-SCNC: 4.6 MMOL/L (ref 3.5–5.1)
PROT SERPL-MCNC: 6.6 G/DL (ref 6.4–8.2)
RBC # BLD AUTO: 4.38 M/UL (ref 4–5.2)
SODIUM SERPL-SCNC: 141 MMOL/L (ref 136–145)
TRIGL SERPL-MCNC: 101 MG/DL (ref 0–150)
TSH SERPL DL<=0.005 MIU/L-ACNC: 0.93 UIU/ML (ref 0.27–4.2)
VLDLC SERPL CALC-MCNC: 20 MG/DL
WBC # BLD AUTO: 5.5 K/UL (ref 4–11)

## 2023-09-28 RX ORDER — SUMATRIPTAN 100 MG/1
TABLET, FILM COATED ORAL
Qty: 9 TABLET | Refills: 5 | Status: SHIPPED | OUTPATIENT
Start: 2023-09-28

## 2023-09-28 RX ORDER — BUDESONIDE AND FORMOTEROL FUMARATE DIHYDRATE 160; 4.5 UG/1; UG/1
2 AEROSOL RESPIRATORY (INHALATION) 2 TIMES DAILY
Qty: 3 EACH | Refills: 3 | Status: SHIPPED | OUTPATIENT
Start: 2023-09-28

## 2023-09-28 RX ORDER — ALBUTEROL SULFATE 90 UG/1
2 AEROSOL, METERED RESPIRATORY (INHALATION) EVERY 6 HOURS PRN
Qty: 3 EACH | Refills: 3 | Status: SHIPPED | OUTPATIENT
Start: 2023-09-28

## 2023-09-28 RX ORDER — DESOXIMETASONE 2.5 MG/G
CREAM TOPICAL
Qty: 60 G | Refills: 1 | Status: SHIPPED | OUTPATIENT
Start: 2023-09-28

## 2023-09-28 RX ORDER — PRIMIDONE 50 MG/1
TABLET ORAL
Qty: 360 TABLET | Refills: 1 | Status: SHIPPED | OUTPATIENT
Start: 2023-09-28

## 2023-09-28 ASSESSMENT — PATIENT HEALTH QUESTIONNAIRE - PHQ9
3. TROUBLE FALLING OR STAYING ASLEEP: 0
4. FEELING TIRED OR HAVING LITTLE ENERGY: 0
SUM OF ALL RESPONSES TO PHQ9 QUESTIONS 1 & 2: 0
1. LITTLE INTEREST OR PLEASURE IN DOING THINGS: 0
8. MOVING OR SPEAKING SO SLOWLY THAT OTHER PEOPLE COULD HAVE NOTICED. OR THE OPPOSITE, BEING SO FIGETY OR RESTLESS THAT YOU HAVE BEEN MOVING AROUND A LOT MORE THAN USUAL: 0
10. IF YOU CHECKED OFF ANY PROBLEMS, HOW DIFFICULT HAVE THESE PROBLEMS MADE IT FOR YOU TO DO YOUR WORK, TAKE CARE OF THINGS AT HOME, OR GET ALONG WITH OTHER PEOPLE: 0
SUM OF ALL RESPONSES TO PHQ QUESTIONS 1-9: 0
SUM OF ALL RESPONSES TO PHQ QUESTIONS 1-9: 0
5. POOR APPETITE OR OVEREATING: 0
9. THOUGHTS THAT YOU WOULD BE BETTER OFF DEAD, OR OF HURTING YOURSELF: 0
SUM OF ALL RESPONSES TO PHQ QUESTIONS 1-9: 0
SUM OF ALL RESPONSES TO PHQ QUESTIONS 1-9: 0
2. FEELING DOWN, DEPRESSED OR HOPELESS: 0
7. TROUBLE CONCENTRATING ON THINGS, SUCH AS READING THE NEWSPAPER OR WATCHING TELEVISION: 0
6. FEELING BAD ABOUT YOURSELF - OR THAT YOU ARE A FAILURE OR HAVE LET YOURSELF OR YOUR FAMILY DOWN: 0

## 2023-09-28 ASSESSMENT — LIFESTYLE VARIABLES: HOW OFTEN DO YOU HAVE A DRINK CONTAINING ALCOHOL: NEVER

## 2023-09-28 NOTE — PROGRESS NOTES
distress. Weight down 5 pounds  Head: normocephalic and atraumatic  Eyes: pupils equal, round, and reactive to light, extraocular eye movements intact, conjunctivae normal  ENT: tympanic membrane, external ear  normal bilaterally, bilateral earwax but hearing to finger rub is intact, oropharynx clear and moist with normal mucous membranes  Neck: neck supple and non tender without mass, no thyromegaly or thyroid nodules, no cervical lymphadenopathy   Pulmonary/Chest: clear to auscultation bilaterally- no wheezes, rales or rhonchi, mildly diminished air movement, no respiratory distress  Cardiovascular: normal rate, normal S1 and S2, no gallops, intact distal pulses, and no carotid bruits  Abdomen: soft, non-tender, non-distended, normal bowel sounds, no masses or organomegaly  Extremities: no cyanosis and no clubbing       Allergies   Allergen Reactions    Vicodin [Hydrocodone-Acetaminophen] Shortness Of Breath     Patient states that she was given too much pain medication during her ED visit which caused her to have shortness of breath and dizziness    Molds & Smuts Other (See Comments)     congestion    Montelukast Sodium Other (See Comments)     Vision issues    Pollen Extract Other (See Comments)     congestion     Prior to Visit Medications    Medication Sig Taking? Authorizing Provider   desoximetasone (TOPICORT) 0.25 % cream Apply topically 2 times daily.  Yes Kristina Shin MD   alendronate (FOSAMAX) 70 MG tablet Take 1 tablet by mouth every 7 days Yes Kristina Shin MD   SUMAtriptan (IMITREX) 100 MG tablet TAKE 1 TABLET BY MOUTH FOR 1 DOSE AS NEEDED FOR MIGRAINE Yes Kristina Shin MD   atorvastatin (LIPITOR) 20 MG tablet Take 1 tablet by mouth daily Yes Kristina Shin MD   albuterol sulfate HFA (PROVENTIL;VENTOLIN;PROAIR) 108 (90 Base) MCG/ACT inhaler Inhale 2 puffs into the lungs every 6 hours as needed for Wheezing Yes Kristina Shin MD   budesonide-formoterol (SYMBICORT) 160-4.5

## 2023-10-03 ENCOUNTER — HOSPITAL ENCOUNTER (OUTPATIENT)
Dept: ULTRASOUND IMAGING | Age: 73
Discharge: HOME | End: 2023-10-03
Attending: FAMILY MEDICINE
Payer: MEDICARE

## 2023-10-03 DIAGNOSIS — N28.89 RIGHT RENAL MASS: ICD-10-CM

## 2023-10-03 PROCEDURE — 76770 US EXAM ABDO BACK WALL COMP: CPT

## 2023-11-02 ENCOUNTER — PRE-PROCEDURE TELEPHONE (OUTPATIENT)
Dept: WOMENS IMAGING | Age: 73
End: 2023-11-02

## 2023-11-02 ENCOUNTER — HOSPITAL ENCOUNTER (OUTPATIENT)
Dept: CT IMAGING | Age: 73
Discharge: HOME | End: 2023-11-02
Payer: MEDICARE

## 2023-11-02 ENCOUNTER — HOSPITAL ENCOUNTER (OUTPATIENT)
Dept: ULTRASOUND IMAGING | Age: 73
Discharge: HOME | End: 2023-11-02
Payer: MEDICARE

## 2023-11-02 ENCOUNTER — TELEPHONE (OUTPATIENT)
Dept: WOMENS IMAGING | Age: 73
End: 2023-11-02

## 2023-11-02 DIAGNOSIS — Z87.891 PERSONAL HISTORY OF TOBACCO USE: ICD-10-CM

## 2023-11-02 DIAGNOSIS — R93.89 ABNORMAL ULTRASOUND: Primary | ICD-10-CM

## 2023-11-02 DIAGNOSIS — N63.10 MASS OF RIGHT BREAST, UNSPECIFIED QUADRANT: ICD-10-CM

## 2023-11-02 PROCEDURE — 71271 CT THORAX LUNG CANCER SCR C-: CPT

## 2023-11-02 PROCEDURE — 76642 ULTRASOUND BREAST LIMITED: CPT

## 2023-11-02 NOTE — PROGRESS NOTES
Imaging Navigator reviewed pre-procedure ultrasound guided breast biopsy patient education information in person and gave written instructions. Reviewed medications and need to hold all blood thinners per instructions. Patient should take all other medications as prescribed. Patient can eat and drink as normal prior to the procedure. Be sure to wear a bra with good support and a two piece outfit for comfort. Patient can bring someone with you but you can also drive yourself. Plan on being at the breast center for 2-2 and a half hours. Reviewed the process of a ultrasound biopsy. The skin is cleaned and a local anesthetic is given to numb the area. A small skin nick is made for the biopsy needle and then tissue samples are taken. A tiny marker is then placed inside your breast at the site of the biopsy for future reference. Pressure is then held on the biopsy site to stop bleeding and steri strips, bandage and waterproof dressing is applied. A mammogram is then done to validate the tissue marker. The tissue sample is sent to pathology. Results will come back in 2-3 business days and sent to your referring physician. Either they or the nurse navigator will call you with the results and recommended follow up needed. Patients states understanding.

## 2023-11-02 NOTE — TELEPHONE ENCOUNTER
Based on imaging done today please sign the following order. Ann Mis is scheduled 11/14 for an ultrasound guided right breast biopsy.   Thank you, Samara Young

## 2023-11-08 ENCOUNTER — TELEPHONE (OUTPATIENT)
Dept: CASE MANAGEMENT | Age: 73
End: 2023-11-08

## 2023-11-08 NOTE — TELEPHONE ENCOUNTER
Baseline lung screen on 11/2/23. LRAD2. Recommended screen in one year. Reviewed by ordering physician and ordering office contacts patient with results. Results letter mailed.  Will follow in the lung screening program.

## 2023-11-14 ENCOUNTER — HOSPITAL ENCOUNTER (OUTPATIENT)
Dept: ULTRASOUND IMAGING | Age: 73
Discharge: HOME | End: 2023-11-14
Payer: MEDICARE

## 2023-11-14 DIAGNOSIS — R93.89 ABNORMAL ULTRASOUND: ICD-10-CM

## 2023-11-14 PROCEDURE — 88185 FLOWCYTOMETRY/TC ADD-ON: CPT

## 2023-11-14 PROCEDURE — 19083 BX BREAST 1ST LESION US IMAG: CPT

## 2023-11-14 PROCEDURE — 2500000003 HC RX 250 WO HCPCS: Performed by: FAMILY MEDICINE

## 2023-11-14 PROCEDURE — 88305 TISSUE EXAM BY PATHOLOGIST: CPT

## 2023-11-14 PROCEDURE — 2580000003 HC RX 258: Performed by: FAMILY MEDICINE

## 2023-11-14 PROCEDURE — 88342 IMHCHEM/IMCYTCHM 1ST ANTB: CPT

## 2023-11-14 PROCEDURE — 88184 FLOWCYTOMETRY/ TC 1 MARKER: CPT

## 2023-11-14 RX ORDER — LIDOCAINE HYDROCHLORIDE AND EPINEPHRINE BITARTRATE 20; .01 MG/ML; MG/ML
20 INJECTION, SOLUTION SUBCUTANEOUS ONCE
Status: COMPLETED | OUTPATIENT
Start: 2023-11-14 | End: 2023-11-14

## 2023-11-14 RX ORDER — LIDOCAINE HYDROCHLORIDE 10 MG/ML
5 INJECTION, SOLUTION EPIDURAL; INFILTRATION; INTRACAUDAL; PERINEURAL ONCE
Status: COMPLETED | OUTPATIENT
Start: 2023-11-14 | End: 2023-11-14

## 2023-11-14 RX ORDER — SODIUM CHLORIDE 0.9 % (FLUSH) 0.9 %
5-40 SYRINGE (ML) INJECTION 2 TIMES DAILY
Status: DISCONTINUED | OUTPATIENT
Start: 2023-11-14 | End: 2023-11-15 | Stop reason: HOSPADM

## 2023-11-14 RX ADMIN — LIDOCAINE HYDROCHLORIDE ANHYDROUS 2 ML: 10 INJECTION, SOLUTION INFILTRATION at 13:13

## 2023-11-14 RX ADMIN — LIDOCAINE HYDROCHLORIDE,EPINEPHRINE BITARTRATE 4 ML: 20; .01 INJECTION, SOLUTION INFILTRATION; PERINEURAL at 13:14

## 2023-11-14 RX ADMIN — Medication 10 ML: at 13:33

## 2023-11-14 ASSESSMENT — PAIN SCALES - GENERAL: PAINLEVEL_OUTOF10: 0
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
## 2023-11-15 ENCOUNTER — TELEPHONE (OUTPATIENT)
Dept: WOMENS IMAGING | Age: 73
End: 2023-11-15

## 2023-12-28 NOTE — PROGRESS NOTES
hyperlipidemia     Pleural plaque with presence of asbestos    Tremor  osteoporosis  Per encounter diagnoses  She is medically cleared for surgery and anesthesia. Plan: At pharm get tdap and pneumovax  Lung ct scan  Per operating surgeon. See also orders filed with this encounter, if any. Rituxan Counseling:  I discussed with the patient the risks of Rituxan infusions. Side effects can include infusion reactions, severe drug rashes including mucocutaneous reactions, reactivation of latent hepatitis and other infections and rarely progressive multifocal leukoencephalopathy.  All of the patient's questions and concerns were addressed.

## 2024-02-01 ENCOUNTER — TELEPHONE (OUTPATIENT)
Dept: FAMILY MEDICINE CLINIC | Age: 74
End: 2024-02-01

## 2024-02-01 NOTE — TELEPHONE ENCOUNTER
Medication and Quantity requested: Atorvastatin 20mg  Patient states Dr. Palmer was going to change dosage  review     Last Visit  9-28-23,    Pharmacy and phone number updated in EPIC:  yes,Meds by mail

## 2024-05-15 ENCOUNTER — HOSPITAL ENCOUNTER (OUTPATIENT)
Dept: CT IMAGING | Age: 74
Discharge: HOME | End: 2024-05-15
Attending: UROLOGY
Payer: MEDICARE

## 2024-05-15 ENCOUNTER — HOSPITAL ENCOUNTER (OUTPATIENT)
Age: 74
Discharge: HOME | End: 2024-05-15
Attending: UROLOGY
Payer: MEDICARE

## 2024-05-15 ENCOUNTER — HOSPITAL ENCOUNTER (OUTPATIENT)
Dept: GENERAL RADIOLOGY | Age: 74
Discharge: HOME | End: 2024-05-15
Attending: UROLOGY
Payer: MEDICARE

## 2024-05-15 DIAGNOSIS — C64.1 MALIGNANT NEOPLASM OF RIGHT KIDNEY, EXCEPT RENAL PELVIS (HCC): ICD-10-CM

## 2024-05-15 LAB
ALBUMIN SERPL-MCNC: 4.3 G/DL (ref 3.4–5)
ALBUMIN/GLOB SERPL: 1.5 {RATIO} (ref 1.1–2.2)
ALP SERPL-CCNC: 79 U/L (ref 40–129)
ALT SERPL-CCNC: 10 U/L (ref 10–40)
ANION GAP SERPL CALCULATED.3IONS-SCNC: 12 MMOL/L (ref 3–16)
AST SERPL-CCNC: 14 U/L (ref 15–37)
BILIRUB SERPL-MCNC: <0.2 MG/DL (ref 0–1)
BUN SERPL-MCNC: 11 MG/DL (ref 7–20)
CALCIUM SERPL-MCNC: 9.8 MG/DL (ref 8.3–10.6)
CHLORIDE SERPL-SCNC: 105 MMOL/L (ref 99–110)
CO2 SERPL-SCNC: 23 MMOL/L (ref 21–32)
CREAT SERPL-MCNC: 0.9 MG/DL (ref 0.6–1.2)
GFR SERPLBLD CREATININE-BSD FMLA CKD-EPI: 66 ML/MIN/{1.73_M2}
GLUCOSE SERPL-MCNC: 91 MG/DL (ref 70–99)
POTASSIUM SERPL-SCNC: 4.7 MMOL/L (ref 3.5–5.1)
PROT SERPL-MCNC: 7.2 G/DL (ref 6.4–8.2)
SODIUM SERPL-SCNC: 140 MMOL/L (ref 136–145)

## 2024-05-15 PROCEDURE — 36415 COLL VENOUS BLD VENIPUNCTURE: CPT

## 2024-05-15 PROCEDURE — 74176 CT ABD & PELVIS W/O CONTRAST: CPT

## 2024-05-15 PROCEDURE — 71046 X-RAY EXAM CHEST 2 VIEWS: CPT

## 2024-05-15 PROCEDURE — 80053 COMPREHEN METABOLIC PANEL: CPT

## 2024-06-18 ENCOUNTER — OFFICE VISIT (OUTPATIENT)
Dept: FAMILY MEDICINE CLINIC | Age: 74
End: 2024-06-18

## 2024-06-18 VITALS
HEART RATE: 76 BPM | OXYGEN SATURATION: 98 % | DIASTOLIC BLOOD PRESSURE: 76 MMHG | BODY MASS INDEX: 21.58 KG/M2 | SYSTOLIC BLOOD PRESSURE: 110 MMHG | WEIGHT: 118 LBS

## 2024-06-18 DIAGNOSIS — Z72.0 TOBACCO ABUSE: ICD-10-CM

## 2024-06-18 DIAGNOSIS — G25.0 BENIGN ESSENTIAL TREMOR: ICD-10-CM

## 2024-06-18 DIAGNOSIS — A09 DIARRHEA OF INFECTIOUS ORIGIN: ICD-10-CM

## 2024-06-18 DIAGNOSIS — Z87.891 PERSONAL HISTORY OF TOBACCO USE: ICD-10-CM

## 2024-06-18 DIAGNOSIS — J43.9 PULMONARY EMPHYSEMA, UNSPECIFIED EMPHYSEMA TYPE (HCC): ICD-10-CM

## 2024-06-18 DIAGNOSIS — G43.909 MIGRAINE WITHOUT STATUS MIGRAINOSUS, NOT INTRACTABLE, UNSPECIFIED MIGRAINE TYPE: ICD-10-CM

## 2024-06-18 DIAGNOSIS — A09 DIARRHEA OF INFECTIOUS ORIGIN: Primary | ICD-10-CM

## 2024-06-18 LAB
ALBUMIN SERPL-MCNC: 4.4 G/DL (ref 3.4–5)
ALBUMIN/GLOB SERPL: 1.8 {RATIO} (ref 1.1–2.2)
ALP SERPL-CCNC: 75 U/L (ref 40–129)
ALT SERPL-CCNC: 11 U/L (ref 10–40)
ANION GAP SERPL CALCULATED.3IONS-SCNC: 12 MMOL/L (ref 3–16)
AST SERPL-CCNC: 14 U/L (ref 15–37)
BASOPHILS # BLD: 0.1 K/UL (ref 0–0.2)
BASOPHILS NFR BLD: 0.9 %
BILIRUB SERPL-MCNC: 0.3 MG/DL (ref 0–1)
BUN SERPL-MCNC: 10 MG/DL (ref 7–20)
CALCIUM SERPL-MCNC: 9.8 MG/DL (ref 8.3–10.6)
CHLORIDE SERPL-SCNC: 101 MMOL/L (ref 99–110)
CHOLEST SERPL-MCNC: 209 MG/DL (ref 0–199)
CO2 SERPL-SCNC: 24 MMOL/L (ref 21–32)
CREAT SERPL-MCNC: 0.9 MG/DL (ref 0.6–1.2)
DEPRECATED RDW RBC AUTO: 13.4 % (ref 12.4–15.4)
EOSINOPHIL # BLD: 0 K/UL (ref 0–0.6)
EOSINOPHIL NFR BLD: 0.8 %
GFR SERPLBLD CREATININE-BSD FMLA CKD-EPI: 66 ML/MIN/{1.73_M2}
GLUCOSE P FAST SERPL-MCNC: 78 MG/DL (ref 70–99)
HCT VFR BLD AUTO: 42.7 % (ref 36–48)
HDLC SERPL-MCNC: 71 MG/DL (ref 40–60)
HGB BLD-MCNC: 14.5 G/DL (ref 12–16)
LDL CHOLESTEROL: 120 MG/DL
LYMPHOCYTES # BLD: 1.7 K/UL (ref 1–5.1)
LYMPHOCYTES NFR BLD: 28.3 %
MCH RBC QN AUTO: 32.8 PG (ref 26–34)
MCHC RBC AUTO-ENTMCNC: 33.8 G/DL (ref 31–36)
MCV RBC AUTO: 97.1 FL (ref 80–100)
MONOCYTES # BLD: 0.3 K/UL (ref 0–1.3)
MONOCYTES NFR BLD: 5.6 %
NEUTROPHILS # BLD: 4 K/UL (ref 1.7–7.7)
NEUTROPHILS NFR BLD: 64.4 %
PLATELET # BLD AUTO: 233 K/UL (ref 135–450)
PMV BLD AUTO: 8.5 FL (ref 5–10.5)
POTASSIUM SERPL-SCNC: 4.2 MMOL/L (ref 3.5–5.1)
PROT SERPL-MCNC: 6.8 G/DL (ref 6.4–8.2)
RBC # BLD AUTO: 4.4 M/UL (ref 4–5.2)
SODIUM SERPL-SCNC: 137 MMOL/L (ref 136–145)
TRIGL SERPL-MCNC: 91 MG/DL (ref 0–150)
TSH SERPL DL<=0.005 MIU/L-ACNC: 0.78 UIU/ML (ref 0.27–4.2)
VLDLC SERPL CALC-MCNC: 18 MG/DL
WBC # BLD AUTO: 6.2 K/UL (ref 4–11)

## 2024-06-18 RX ORDER — PRIMIDONE 50 MG/1
TABLET ORAL
Qty: 270 TABLET | Refills: 3 | Status: SHIPPED | OUTPATIENT
Start: 2024-06-18

## 2024-06-18 RX ORDER — SUMATRIPTAN 100 MG/1
TABLET, FILM COATED ORAL
Qty: 9 TABLET | Refills: 5 | Status: SHIPPED | OUTPATIENT
Start: 2024-06-18

## 2024-06-18 NOTE — PROGRESS NOTES
Jenna Merida is a 73 y.o. female.    HPI:  Loose stools for last 2 mo, 8 watery , mushy stools per day  No brbpr, some abd cramping  No travel, no camping  Appetite nl, weight has down  Stil smokes  8 cigs per week  Concerned about several small bumps on her wrists  Wt Readings from Last 3 Encounters:   06/18/24 53.5 kg (118 lb)   09/28/23 54 kg (119 lb)   03/28/23 57.6 kg (127 lb)     Meds, vitamins and allergies reviewed with Patient    ROS:  Gen: No fever  HEENT: No cold symptoms, no sore throat.  CV:  Denies chest pain or palpitations.  Pulm:  Denies shortness of breath, cough.  Abd:  Denies abdominal pain, nausea and vomiting.  Skin: no rash    Allergies   Allergen Reactions    Vicodin [Hydrocodone-Acetaminophen] Shortness Of Breath     Patient states that she was given too much pain medication during her ED visit which caused her to have shortness of breath and dizziness    Molds & Smuts Other (See Comments)     congestion    Montelukast Sodium Other (See Comments)     Vision issues    Pollen Extract Other (See Comments)     congestion       Prior to Visit Medications    Medication Sig Taking? Authorizing Provider   desoximetasone (TOPICORT) 0.25 % cream Apply topically 2 times daily. Yes Lobo Palmer MD   aspirin 81 MG EC tablet Take 1 tablet by mouth daily Yes Casey Guillen MD   Cholecalciferol (VITAMIN D PO) Take by mouth Yes Casey Guillen MD   Ascorbic Acid (VITAMIN C PO) Take by mouth Yes Casey Guillen MD   primidone (MYSOLINE) 50 MG tablet Take 1 tablet in the morning, 1 tablet at noon and 2 tablets at night Yes Lobo Palmer MD   SUMAtriptan (IMITREX) 100 MG tablet TAKE 1 TABLET BY MOUTH FOR 1 DOSE AS NEEDED FOR MIGRAINE Yes Lobo Palmer MD       OBJECTIVE:  /76   Pulse 76   Wt 53.5 kg (118 lb)   SpO2 98%   BMI 21.58 kg/m²   GEN:  in NAD, thin, smells of smoke.  Weight down 1 pound in the last 9 months  HEENT:  NCAT, TMs:normal and throat:

## 2024-06-19 LAB — TISSUE TRANSGLUTAMINASE IGA: <0.5 U/ML (ref 0–14)

## 2024-06-26 ENCOUNTER — HOSPITAL ENCOUNTER (OUTPATIENT)
Age: 74
Discharge: HOME | End: 2024-06-26
Payer: MEDICARE

## 2024-06-26 LAB — C DIFF TOX A+B STL QL IA: NORMAL

## 2024-06-26 PROCEDURE — 87324 CLOSTRIDIUM AG IA: CPT

## 2024-06-26 PROCEDURE — 87506 IADNA-DNA/RNA PROBE TQ 6-11: CPT

## 2024-06-26 PROCEDURE — 87449 NOS EACH ORGANISM AG IA: CPT

## 2024-06-27 LAB — GI PATHOGENS PNL STL NAA+PROBE: NORMAL

## 2024-09-23 ENCOUNTER — APPOINTMENT (OUTPATIENT)
Dept: GENERAL RADIOLOGY | Age: 74
DRG: 178 | End: 2024-09-23
Payer: MEDICARE

## 2024-09-23 ENCOUNTER — TELEPHONE (OUTPATIENT)
Dept: FAMILY MEDICINE CLINIC | Age: 74
End: 2024-09-23

## 2024-09-23 ENCOUNTER — HOSPITAL ENCOUNTER (INPATIENT)
Age: 74
LOS: 1 days | Discharge: HOME | DRG: 178 | End: 2024-09-24
Attending: INTERNAL MEDICINE | Admitting: HOSPITALIST
Payer: MEDICARE

## 2024-09-23 ENCOUNTER — APPOINTMENT (OUTPATIENT)
Dept: CT IMAGING | Age: 74
DRG: 178 | End: 2024-09-23
Payer: MEDICARE

## 2024-09-23 DIAGNOSIS — U07.1 COVID-19: Primary | ICD-10-CM

## 2024-09-23 DIAGNOSIS — R93.89 ABNORMAL CT OF THE CHEST: ICD-10-CM

## 2024-09-23 DIAGNOSIS — R53.1 GENERAL WEAKNESS: ICD-10-CM

## 2024-09-23 PROBLEM — R06.02 SOB (SHORTNESS OF BREATH): Status: ACTIVE | Noted: 2024-09-23

## 2024-09-23 LAB
ALBUMIN SERPL-MCNC: 4.2 G/DL (ref 3.4–5)
ALBUMIN/GLOB SERPL: 1.4 {RATIO} (ref 1.1–2.2)
ALP SERPL-CCNC: 77 U/L (ref 40–129)
ALT SERPL-CCNC: 10 U/L (ref 10–40)
ANION GAP SERPL CALCULATED.3IONS-SCNC: 11 MMOL/L (ref 3–16)
AST SERPL-CCNC: 15 U/L (ref 15–37)
BASOPHILS # BLD: 0 K/UL (ref 0–0.2)
BASOPHILS NFR BLD: 0.4 %
BILIRUB SERPL-MCNC: 0.4 MG/DL (ref 0–1)
BUN SERPL-MCNC: 9 MG/DL (ref 7–20)
CALCIUM SERPL-MCNC: 9.5 MG/DL (ref 8.3–10.6)
CHLORIDE SERPL-SCNC: 101 MMOL/L (ref 99–110)
CO2 SERPL-SCNC: 25 MMOL/L (ref 21–32)
CREAT SERPL-MCNC: 0.8 MG/DL (ref 0.6–1.2)
DEPRECATED RDW RBC AUTO: 13.2 % (ref 12.4–15.4)
EKG ATRIAL RATE: 71 BPM
EKG DIAGNOSIS: NORMAL
EKG P AXIS: 64 DEGREES
EKG P-R INTERVAL: 152 MS
EKG Q-T INTERVAL: 392 MS
EKG QRS DURATION: 86 MS
EKG QTC CALCULATION (BAZETT): 425 MS
EKG R AXIS: -42 DEGREES
EKG T AXIS: 28 DEGREES
EKG VENTRICULAR RATE: 71 BPM
EOSINOPHIL # BLD: 0 K/UL (ref 0–0.6)
EOSINOPHIL NFR BLD: 0.2 %
FLUAV RNA RESP QL NAA+PROBE: NOT DETECTED
FLUBV RNA RESP QL NAA+PROBE: NOT DETECTED
GFR SERPLBLD CREATININE-BSD FMLA CKD-EPI: 76 ML/MIN/{1.73_M2}
GLUCOSE SERPL-MCNC: 94 MG/DL (ref 70–99)
HCT VFR BLD AUTO: 45.8 % (ref 36–48)
HGB BLD-MCNC: 15.4 G/DL (ref 12–16)
LIPASE SERPL-CCNC: 46 U/L (ref 13–60)
LYMPHOCYTES # BLD: 1.7 K/UL (ref 1–5.1)
LYMPHOCYTES NFR BLD: 24 %
MAGNESIUM SERPL-MCNC: 2 MG/DL (ref 1.8–2.4)
MCH RBC QN AUTO: 32 PG (ref 26–34)
MCHC RBC AUTO-ENTMCNC: 33.5 G/DL (ref 31–36)
MCV RBC AUTO: 95.3 FL (ref 80–100)
MONOCYTES # BLD: 0.4 K/UL (ref 0–1.3)
MONOCYTES NFR BLD: 6.1 %
NEUTROPHILS # BLD: 4.8 K/UL (ref 1.7–7.7)
NEUTROPHILS NFR BLD: 69.3 %
NT-PROBNP SERPL-MCNC: 160 PG/ML (ref 0–449)
PLATELET # BLD AUTO: 274 K/UL (ref 135–450)
PMV BLD AUTO: 8.2 FL (ref 5–10.5)
POTASSIUM SERPL-SCNC: 3.9 MMOL/L (ref 3.5–5.1)
PROT SERPL-MCNC: 7.2 G/DL (ref 6.4–8.2)
RBC # BLD AUTO: 4.8 M/UL (ref 4–5.2)
SARS-COV-2 RNA RESP QL NAA+PROBE: DETECTED
SODIUM SERPL-SCNC: 137 MMOL/L (ref 136–145)
TROPONIN, HIGH SENSITIVITY: 10 NG/L (ref 0–14)
WBC # BLD AUTO: 6.9 K/UL (ref 4–11)

## 2024-09-23 PROCEDURE — 6360000002 HC RX W HCPCS: Performed by: HOSPITALIST

## 2024-09-23 PROCEDURE — 84484 ASSAY OF TROPONIN QUANT: CPT

## 2024-09-23 PROCEDURE — 96374 THER/PROPH/DIAG INJ IV PUSH: CPT

## 2024-09-23 PROCEDURE — 2580000003 HC RX 258: Performed by: PHYSICIAN ASSISTANT

## 2024-09-23 PROCEDURE — 2580000003 HC RX 258: Performed by: HOSPITALIST

## 2024-09-23 PROCEDURE — 71250 CT THORAX DX C-: CPT

## 2024-09-23 PROCEDURE — 99285 EMERGENCY DEPT VISIT HI MDM: CPT

## 2024-09-23 PROCEDURE — 80053 COMPREHEN METABOLIC PANEL: CPT

## 2024-09-23 PROCEDURE — 93005 ELECTROCARDIOGRAM TRACING: CPT | Performed by: INTERNAL MEDICINE

## 2024-09-23 PROCEDURE — 83690 ASSAY OF LIPASE: CPT

## 2024-09-23 PROCEDURE — 83735 ASSAY OF MAGNESIUM: CPT

## 2024-09-23 PROCEDURE — 71045 X-RAY EXAM CHEST 1 VIEW: CPT

## 2024-09-23 PROCEDURE — 6360000002 HC RX W HCPCS: Performed by: PHYSICIAN ASSISTANT

## 2024-09-23 PROCEDURE — 85025 COMPLETE CBC W/AUTO DIFF WBC: CPT

## 2024-09-23 PROCEDURE — 6370000000 HC RX 637 (ALT 250 FOR IP): Performed by: HOSPITALIST

## 2024-09-23 PROCEDURE — 1200000000 HC SEMI PRIVATE

## 2024-09-23 PROCEDURE — 87636 SARSCOV2 & INF A&B AMP PRB: CPT

## 2024-09-23 PROCEDURE — 93010 ELECTROCARDIOGRAM REPORT: CPT | Performed by: INTERNAL MEDICINE

## 2024-09-23 PROCEDURE — 83880 ASSAY OF NATRIURETIC PEPTIDE: CPT

## 2024-09-23 RX ORDER — SODIUM CHLORIDE 9 MG/ML
INJECTION, SOLUTION INTRAVENOUS PRN
Status: DISCONTINUED | OUTPATIENT
Start: 2024-09-23 | End: 2024-09-24 | Stop reason: HOSPADM

## 2024-09-23 RX ORDER — POTASSIUM CHLORIDE 7.45 MG/ML
10 INJECTION INTRAVENOUS PRN
Status: DISCONTINUED | OUTPATIENT
Start: 2024-09-23 | End: 2024-09-24 | Stop reason: HOSPADM

## 2024-09-23 RX ORDER — ONDANSETRON 2 MG/ML
4 INJECTION INTRAMUSCULAR; INTRAVENOUS EVERY 6 HOURS PRN
Status: DISCONTINUED | OUTPATIENT
Start: 2024-09-23 | End: 2024-09-24 | Stop reason: HOSPADM

## 2024-09-23 RX ORDER — 0.9 % SODIUM CHLORIDE 0.9 %
1000 INTRAVENOUS SOLUTION INTRAVENOUS ONCE
Status: COMPLETED | OUTPATIENT
Start: 2024-09-23 | End: 2024-09-23

## 2024-09-23 RX ORDER — POLYETHYLENE GLYCOL 3350 17 G/17G
17 POWDER, FOR SOLUTION ORAL DAILY PRN
Status: DISCONTINUED | OUTPATIENT
Start: 2024-09-23 | End: 2024-09-24 | Stop reason: HOSPADM

## 2024-09-23 RX ORDER — SODIUM CHLORIDE 9 MG/ML
INJECTION, SOLUTION INTRAVENOUS CONTINUOUS
Status: DISCONTINUED | OUTPATIENT
Start: 2024-09-23 | End: 2024-09-24 | Stop reason: HOSPADM

## 2024-09-23 RX ORDER — PRIMIDONE 50 MG/1
100 TABLET ORAL NIGHTLY
Status: DISCONTINUED | OUTPATIENT
Start: 2024-09-23 | End: 2024-09-24 | Stop reason: HOSPADM

## 2024-09-23 RX ORDER — KETOROLAC TROMETHAMINE 15 MG/ML
15 INJECTION, SOLUTION INTRAMUSCULAR; INTRAVENOUS ONCE
Status: DISCONTINUED | OUTPATIENT
Start: 2024-09-23 | End: 2024-09-23

## 2024-09-23 RX ORDER — ONDANSETRON 4 MG/1
4 TABLET, ORALLY DISINTEGRATING ORAL EVERY 8 HOURS PRN
Status: DISCONTINUED | OUTPATIENT
Start: 2024-09-23 | End: 2024-09-24 | Stop reason: HOSPADM

## 2024-09-23 RX ORDER — SODIUM CHLORIDE 0.9 % (FLUSH) 0.9 %
5-40 SYRINGE (ML) INJECTION EVERY 12 HOURS SCHEDULED
Status: DISCONTINUED | OUTPATIENT
Start: 2024-09-23 | End: 2024-09-24 | Stop reason: HOSPADM

## 2024-09-23 RX ORDER — ACETAMINOPHEN 325 MG/1
650 TABLET ORAL EVERY 6 HOURS PRN
Status: DISCONTINUED | OUTPATIENT
Start: 2024-09-23 | End: 2024-09-24 | Stop reason: HOSPADM

## 2024-09-23 RX ORDER — ONDANSETRON 2 MG/ML
4 INJECTION INTRAMUSCULAR; INTRAVENOUS ONCE
Status: COMPLETED | OUTPATIENT
Start: 2024-09-23 | End: 2024-09-23

## 2024-09-23 RX ORDER — PRIMIDONE 50 MG/1
50 TABLET ORAL 2 TIMES DAILY
Status: DISCONTINUED | OUTPATIENT
Start: 2024-09-24 | End: 2024-09-24 | Stop reason: HOSPADM

## 2024-09-23 RX ORDER — SODIUM CHLORIDE 0.9 % (FLUSH) 0.9 %
5-40 SYRINGE (ML) INJECTION PRN
Status: DISCONTINUED | OUTPATIENT
Start: 2024-09-23 | End: 2024-09-24 | Stop reason: HOSPADM

## 2024-09-23 RX ORDER — ACETAMINOPHEN 650 MG/1
650 SUPPOSITORY RECTAL EVERY 6 HOURS PRN
Status: DISCONTINUED | OUTPATIENT
Start: 2024-09-23 | End: 2024-09-24 | Stop reason: HOSPADM

## 2024-09-23 RX ORDER — POTASSIUM CHLORIDE 1500 MG/1
40 TABLET, EXTENDED RELEASE ORAL PRN
Status: DISCONTINUED | OUTPATIENT
Start: 2024-09-23 | End: 2024-09-24 | Stop reason: HOSPADM

## 2024-09-23 RX ORDER — ENOXAPARIN SODIUM 100 MG/ML
30 INJECTION SUBCUTANEOUS DAILY
Status: DISCONTINUED | OUTPATIENT
Start: 2024-09-23 | End: 2024-09-24 | Stop reason: HOSPADM

## 2024-09-23 RX ORDER — DROPERIDOL 2.5 MG/ML
1.25 INJECTION, SOLUTION INTRAMUSCULAR; INTRAVENOUS ONCE
Status: COMPLETED | OUTPATIENT
Start: 2024-09-23 | End: 2024-09-23

## 2024-09-23 RX ORDER — MAGNESIUM SULFATE IN WATER 40 MG/ML
2000 INJECTION, SOLUTION INTRAVENOUS PRN
Status: DISCONTINUED | OUTPATIENT
Start: 2024-09-23 | End: 2024-09-24 | Stop reason: HOSPADM

## 2024-09-23 RX ADMIN — ONDANSETRON 4 MG: 2 INJECTION, SOLUTION INTRAMUSCULAR; INTRAVENOUS at 12:54

## 2024-09-23 RX ADMIN — PIPERACILLIN AND TAZOBACTAM 4500 MG: 4; .5 INJECTION, POWDER, FOR SOLUTION INTRAVENOUS at 17:31

## 2024-09-23 RX ADMIN — PRIMIDONE 100 MG: 50 TABLET ORAL at 20:52

## 2024-09-23 RX ADMIN — DROPERIDOL 1.25 MG: 2.5 INJECTION, SOLUTION INTRAMUSCULAR; INTRAVENOUS at 16:32

## 2024-09-23 RX ADMIN — ACETAMINOPHEN 650 MG: 325 TABLET ORAL at 23:50

## 2024-09-23 RX ADMIN — SODIUM CHLORIDE: 9 INJECTION, SOLUTION INTRAVENOUS at 17:29

## 2024-09-23 RX ADMIN — PIPERACILLIN AND TAZOBACTAM 3375 MG: 3; .375 INJECTION, POWDER, LYOPHILIZED, FOR SOLUTION INTRAVENOUS at 23:08

## 2024-09-23 RX ADMIN — SODIUM CHLORIDE 1000 ML: 9 INJECTION, SOLUTION INTRAVENOUS at 12:53

## 2024-09-23 ASSESSMENT — ENCOUNTER SYMPTOMS
COLOR CHANGE: 0
TROUBLE SWALLOWING: 0
DIARRHEA: 1
WHEEZING: 0
SORE THROAT: 0
BACK PAIN: 0
ANAL BLEEDING: 0
ABDOMINAL PAIN: 0
VOMITING: 0
NAUSEA: 1
COUGH: 1
CONSTIPATION: 0
BLOOD IN STOOL: 0
SHORTNESS OF BREATH: 1
ABDOMINAL DISTENTION: 0
VOICE CHANGE: 0
RECTAL PAIN: 0
STRIDOR: 0

## 2024-09-23 ASSESSMENT — PAIN DESCRIPTION - LOCATION: LOCATION: HEAD

## 2024-09-23 ASSESSMENT — PAIN SCALES - GENERAL: PAINLEVEL_OUTOF10: 3

## 2024-09-23 NOTE — H&P
HOSPITALISTS HISTORY AND PHYSICAL    9/23/2024 3:38 PM    Patient Information:  JENNA BURLESON is a 73 y.o. female [id number]  PCP:  Lobo Palmer MD (Tel: [phone] )    Chief complaint:    Chief Complaint   Patient presents with    Illness     Pt from home c/o illness x 1.5 weeks. Pt states she has had fevers, difficulty breathing, n&v. Pt states her last fever was 4 days ago.        History of Present Illness:  Jenna Burleson is a 73 y.o. female who presented to ER with complaints of generalized fatigue weakness intermittent headache cough shortness of breath associate with nausea and diarrhea feeling weak and fatigued.  Poor appetite.  Patient denies any sick contacts.  History of COPD and asbestosis exposure.  Patient has been recently ill with URI symptoms.  But he improved and resolved but still fail to improve.  Denies any chest pain.        REVIEW OF SYSTEMS:   Constitutional: Negative for fever,chills or night sweats  ENT: Negative for rhinorrhea, epistaxis, hoarseness, sore throat.  Respiratory: Negative for shortness of breath,wheezing  Cardiovascular: Negative for chest pain, palpitations   Gastrointestinal: Negative for nausea, vomiting, diarrhea  Genitourinary: Negative for polyuria, dysuria   Hematologic/Lymphatic: Negative for bleeding tendency, easy bruising  Musculoskeletal: Negative for myalgias and arthralgias  Neurologic: Negative for confusion,dysarthria.  Skin: Negative for itching,rash, good capillary refill.   Psychiatric: Negative for depression,anxiety, agitation.  Endocrine: Negative for polydipsia,polyuria,heat /cold intolerance.    Past Medical History:   has a past medical history of Acne, Bilateral malignant neoplasm of breast in female, unspecified estrogen receptor status, unspecified site of breast (HCC),

## 2024-09-23 NOTE — PROGRESS NOTES
4 Eyes Skin Assessment     NAME:  Jenna Merida  YOB: 1950  MEDICAL RECORD NUMBER:  [id number]    The patient is being assessed for  Admission    I agree that at least one RN has performed a thorough Head to Toe Skin Assessment on the patient. ALL assessment sites listed below have been assessed.      Areas assessed by both nurses:    Head, Face, Ears, Shoulders, Back, Chest, Arms, Elbows, Hands, Sacrum. Buttock, Coccyx, Ischium, Legs. Feet and Heels, and Under Medical Devices         Does the Patient have a Wound? No noted wound(s)       Vazquez Prevention initiated by RN: No  Wound Care Orders initiated by RN: No    Pressure Injury (Stage 3,4, Unstageable, DTI, NWPT, and Complex wounds) if present, place Wound referral order by RN under : No    New Ostomies, if present place, Ostomy referral order under : No     Nurse 1 eSignature: Electronically signed by Venus Muñoz RN on 9/23/24 at 5:19 PM EDT    **SHARE this note so that the co-signing nurse can place an eSignature**    Nurse 2 eSignature: Electronically signed by Meche Herrera RN on 9/23/24 at 5:54 PM EDT

## 2024-09-23 NOTE — PLAN OF CARE
Problem: Discharge Planning  Goal: Discharge to home or other facility with appropriate resources  Outcome: Progressing  Flowsheets  Taken 9/23/2024 1656  Discharge to home or other facility with appropriate resources: Identify barriers to discharge with patient and caregiver  Taken 9/23/2024 1655  Discharge to home or other facility with appropriate resources: Identify barriers to discharge with patient and caregiver     Problem: Safety - Adult  Goal: Free from fall injury  Outcome: Progressing

## 2024-09-23 NOTE — ED PROVIDER NOTES
[facility] EMERGENCY DEPARTMENT  EMERGENCY DEPARTMENT ENCOUNTER        Pt Name: Jenna Merida  MRN: [id number]  Birthdate 1950  Date of evaluation: 9/23/2024  Provider: Chivo Weaver PA-C  PCP: Lobo Palmer MD  Note Started: 12:04 PM EDT 9/23/24      JAY. I have evaluated this patient.        CHIEF COMPLAINT       Chief Complaint   Patient presents with    Illness     Pt from home c/o illness x 1.5 weeks. Pt states she has had fevers, difficulty breathing, n&v. Pt states her last fever was 4 days ago.       HISTORY OF PRESENT ILLNESS: 1 or more Elements     History from : Patient    Limitations to history : None    Jenna Merida is a 73 y.o. female who presents to the emergency department stating that for 1 to 2 weeks she has had intermittent headaches, fever, chills, productive cough, shortness of breath, nausea and diarrhea.  She feels very weak and fatigued.  She has had no appetite.  She denies bloody or black tarry stools.  she denies any urinary symptoms.  She feels like she is dehydrated.  She is a former smoker with a history of COPD and asbestos exposure. She is not currently following up with a pulmonologist.  Years ago her right kidney was removed due to carcinoma.  She is not undergoing any radiation or chemotherapy treatment. Her  was recently ill with URI but his symptoms resolved and hers have not improved.    Nursing Notes were all reviewed and agreed with or any disagreements were addressed in the HPI.    REVIEW OF SYSTEMS :      Review of Systems   Constitutional:  Positive for activity change, appetite change, chills, fatigue and fever. Negative for diaphoresis.   HENT:  Positive for congestion. Negative for dental problem, drooling, ear discharge, sore throat, tinnitus, trouble swallowing and voice change.    Eyes:  Negative for visual disturbance.   Respiratory:  Positive for cough and shortness of breath. Negative for wheezing  highly suspected (including COVID-19) without concomitant bacterial infection    CONSULTS: (Who and What was discussed)  IP CONSULT TO HOSPITALIST  IP CONSULT TO PULMONOLOGY  Discussion with Other Profesionals : Admitting Team hospitalist paged for admission 1510 and Consultant URVASHI monk with Dr Reddy, pulmonologist, at 1530.    Social Determinants : alcohol use    Records Reviewed : Outpatient Labs & Studies CT lung screen 11/4/23    CC/HPI Summary, DDx, ED Course, and Reassessment: This patient presents to the emergency department with viral illness type symptoms.  She is COVID-positive.  She does have history of asbestos and former smoker.  She does not follow-up regularly with a pulmonologist.  CT scan shows complete opacification of bronchus intermedius with opacification of the right middle and lower lobes.  I do feel admission is warranted since she does not have close pulmonology follow-up.  I discussed the case with pulmonology.  Patient will likely require a bronchoscopy at some point in time however with her having active covid infection, this may be postponed.  Patient and family understand and agree with plan for admission    Disposition Considerations (include 1 Tests not done, Shared Decision Making, Pt Expectation of Test or Tx.): admit         I am the Primary Clinician of Record.    FINAL IMPRESSION      1. COVID-19    2. General weakness    3. Abnormal CT of the chest- opacification of bronchus intermedius and right middle/lower lobes          DISPOSITION/PLAN     DISPOSITION Decision To Admit 09/23/2024 03:09:09 PM  Condition at Disposition: Stable      PATIENT REFERRED TO:  No follow-up provider specified.    DISCHARGE MEDICATIONS:  New Prescriptions    No medications on file       DISCONTINUED MEDICATIONS:  Discontinued Medications    No medications on file              (Please note that portions of this note were completed with a voice recognition program.  Efforts were made to edit the

## 2024-09-24 VITALS
RESPIRATION RATE: 16 BRPM | HEIGHT: 62 IN | TEMPERATURE: 98.3 F | WEIGHT: 114.5 LBS | SYSTOLIC BLOOD PRESSURE: 129 MMHG | OXYGEN SATURATION: 95 % | BODY MASS INDEX: 21.07 KG/M2 | DIASTOLIC BLOOD PRESSURE: 79 MMHG | HEART RATE: 68 BPM

## 2024-09-24 LAB
ANION GAP SERPL CALCULATED.3IONS-SCNC: 14 MMOL/L (ref 3–16)
BASOPHILS # BLD: 0 K/UL (ref 0–0.2)
BASOPHILS NFR BLD: 0.8 %
BUN SERPL-MCNC: 8 MG/DL (ref 7–20)
CALCIUM SERPL-MCNC: 8.6 MG/DL (ref 8.3–10.6)
CHLORIDE SERPL-SCNC: 108 MMOL/L (ref 99–110)
CO2 SERPL-SCNC: 20 MMOL/L (ref 21–32)
CREAT SERPL-MCNC: 0.8 MG/DL (ref 0.6–1.2)
DEPRECATED RDW RBC AUTO: 13 % (ref 12.4–15.4)
EOSINOPHIL # BLD: 0 K/UL (ref 0–0.6)
EOSINOPHIL NFR BLD: 0.7 %
GFR SERPLBLD CREATININE-BSD FMLA CKD-EPI: 76 ML/MIN/{1.73_M2}
GLUCOSE SERPL-MCNC: 76 MG/DL (ref 70–99)
HCT VFR BLD AUTO: 40.4 % (ref 36–48)
HGB BLD-MCNC: 13.5 G/DL (ref 12–16)
LYMPHOCYTES # BLD: 1.8 K/UL (ref 1–5.1)
LYMPHOCYTES NFR BLD: 29.3 %
MCH RBC QN AUTO: 32.4 PG (ref 26–34)
MCHC RBC AUTO-ENTMCNC: 33.5 G/DL (ref 31–36)
MCV RBC AUTO: 96.6 FL (ref 80–100)
MONOCYTES # BLD: 0.4 K/UL (ref 0–1.3)
MONOCYTES NFR BLD: 6.8 %
NEUTROPHILS # BLD: 3.8 K/UL (ref 1.7–7.7)
NEUTROPHILS NFR BLD: 62.4 %
PLATELET # BLD AUTO: 242 K/UL (ref 135–450)
PMV BLD AUTO: 8.2 FL (ref 5–10.5)
POTASSIUM SERPL-SCNC: 3.8 MMOL/L (ref 3.5–5.1)
RBC # BLD AUTO: 4.18 M/UL (ref 4–5.2)
SODIUM SERPL-SCNC: 142 MMOL/L (ref 136–145)
WBC # BLD AUTO: 6.2 K/UL (ref 4–11)

## 2024-09-24 PROCEDURE — 99223 1ST HOSP IP/OBS HIGH 75: CPT | Performed by: STUDENT IN AN ORGANIZED HEALTH CARE EDUCATION/TRAINING PROGRAM

## 2024-09-24 PROCEDURE — 85025 COMPLETE CBC W/AUTO DIFF WBC: CPT

## 2024-09-24 PROCEDURE — 80048 BASIC METABOLIC PNL TOTAL CA: CPT

## 2024-09-24 PROCEDURE — 6360000002 HC RX W HCPCS: Performed by: HOSPITALIST

## 2024-09-24 PROCEDURE — 36415 COLL VENOUS BLD VENIPUNCTURE: CPT

## 2024-09-24 PROCEDURE — 6370000000 HC RX 637 (ALT 250 FOR IP): Performed by: HOSPITALIST

## 2024-09-24 PROCEDURE — 2580000003 HC RX 258: Performed by: HOSPITALIST

## 2024-09-24 RX ORDER — SUMATRIPTAN 25 MG/1
100 TABLET, FILM COATED ORAL DAILY PRN
Status: DISCONTINUED | OUTPATIENT
Start: 2024-09-24 | End: 2024-09-24 | Stop reason: HOSPADM

## 2024-09-24 RX ORDER — DOXYCYCLINE HYCLATE 100 MG
100 TABLET ORAL 2 TIMES DAILY
Qty: 14 TABLET | Refills: 0 | Status: SHIPPED | OUTPATIENT
Start: 2024-09-24 | End: 2024-10-01

## 2024-09-24 RX ADMIN — PRIMIDONE 50 MG: 50 TABLET ORAL at 06:31

## 2024-09-24 RX ADMIN — SODIUM CHLORIDE, PRESERVATIVE FREE 10 ML: 5 INJECTION INTRAVENOUS at 08:41

## 2024-09-24 RX ADMIN — ONDANSETRON 4 MG: 2 INJECTION, SOLUTION INTRAMUSCULAR; INTRAVENOUS at 06:36

## 2024-09-24 RX ADMIN — PIPERACILLIN AND TAZOBACTAM 3375 MG: 3; .375 INJECTION, POWDER, LYOPHILIZED, FOR SOLUTION INTRAVENOUS at 06:34

## 2024-09-24 ASSESSMENT — PAIN SCALES - WONG BAKER
WONGBAKER_NUMERICALRESPONSE: NO HURT
WONGBAKER_NUMERICALRESPONSE: NO HURT

## 2024-09-24 ASSESSMENT — PAIN SCALES - GENERAL: PAINLEVEL_OUTOF10: 0

## 2024-09-24 NOTE — PLAN OF CARE
Problem: Discharge Planning  Goal: Discharge to home or other facility with appropriate resources  9/24/2024 0838 by Anthony Cameron RN  Outcome: Progressing  9/23/2024 2131 by Yolande Elias, RN  Outcome: Progressing  Flowsheets (Taken 9/23/2024 2131)  Discharge to home or other facility with appropriate resources:   Identify barriers to discharge with patient and caregiver   Arrange for needed discharge resources and transportation as appropriate     Problem: Safety - Adult  Goal: Free from fall injury  9/24/2024 0838 by Anthony Cameron RN  Outcome: Progressing  9/23/2024 2131 by Yolande Elias, RN  Outcome: Progressing

## 2024-09-24 NOTE — PLAN OF CARE
Problem: Discharge Planning  Goal: Discharge to home or other facility with appropriate resources  9/23/2024 2131 by Yolande Elias, RN  Outcome: Progressing  Flowsheets (Taken 9/23/2024 2131)  Discharge to home or other facility with appropriate resources:   Identify barriers to discharge with patient and caregiver   Arrange for needed discharge resources and transportation as appropriate     Problem: Safety - Adult  Goal: Free from fall injury  9/23/2024 2131 by Yolande Elias, RN  Outcome: Progressing

## 2024-09-24 NOTE — PROGRESS NOTES
Patient is A/ox4. Denies SOB, nausea or vomiting. Patient is refusing IV medications at this time, RN provided provided, patient verbalized her understanding of education. Patient agreeable to have PICC nurse place new piv in a location that will no cause pump to alarm so frequently. RN paged MD for a prn for anxiety, patient agreeable to this. RN also requested home migraine medication to be ordered. Patient is pleasant, but states she is upset and wishes to be discharged. Patient does have a visible tremor which she states is exacerbated when \"she is anxious\". Patient stable and denied needs when writer left room.    9:48 AM  Patient refused PICC nurse's attempt at an IV. Pulmonology and hospitalist notified. Patient did mention wanting to leave AMA, education provided, patient verbalized her understanding. This RN notified charge nurse as well. Patient states at this time she is agreeable to wait for pulm to round but declines IV placement. RN is still waiting on response from early morning page regarding anxiety PRN and migraine medication.

## 2024-09-24 NOTE — DISCHARGE SUMMARY
Hospital Medicine Discharge Summary    Patient: Jenna Merida     Gender: female  : 1950   Age: 73 y.o.  MRN: [id number]    Admitting Physician: Caitlyn Lucia MD  Discharge Physician: Jeremiah Bright MD     Code Status: Full Code     Admit Date: 2024   Discharge Date:       Disposition:  Home  Time spent arranging discharge: 31 minutes    Discharge Diagnoses:    Active Hospital Problems    Diagnosis Date Noted    SOB (shortness of breath) [R06.02] 2024         Condition at Discharge:  Stable    Hospital Course:   Patient admitted to the hospital with shortness of breath secondary to COVID-19 did well on room air seen by pulmonology will be discharged with a course of doxycycline and follow-up with pulm in 2 weeks repeat chest x-ray    Discharge Exam:    /79   Pulse 68   Temp 98.3 °F (36.8 °C) (Oral)   Resp 16   Ht 1.575 m (5' 2\")   Wt 51.9 kg (114 lb 8 oz)   SpO2 95%   BMI 20.94 kg/m²   General appearance:  Appears comfortable. AAOx3  HEENT: atraumatic, Pupils equal, muscous membranes moist, no masses appreciated  Cardiovascular: Regular rate and rhythm no murmurs appreciated  Respiratory: CTAB no wheezing  Gastrointestinal: Abdomen soft, non-tender, BS+  EXT: no edema  Neurology: no gross focal deficts  Psychiatry: Appropriate affect. Not agitated  Skin: Warm, dry, no rashes appreciated    Discharge Medications:   Current Discharge Medication List        START taking these medications    Details   doxycycline hyclate (VIBRA-TABS) 100 MG tablet Take 1 tablet by mouth 2 times daily for 7 days  Qty: 14 tablet, Refills: 0           Current Discharge Medication List        Current Discharge Medication List        CONTINUE these medications which have NOT CHANGED    Details   primidone (MYSOLINE) 50 MG tablet Take 1 tablet in the morning, 1 tablet at noon and 2 tablets at night  Qty: 270 tablet, Refills: 3    Associated Diagnoses: Benign essential tremor

## 2024-09-24 NOTE — CONSULTS
Pulmonary and Critical Care Medicine    CC: SOB   Date: 9/24/2024  Admit Date:  9/23/2024  Reason for Consultation: Atelectasis   Consult Requesting Physician: Jeremiah Bright MD     HPI     This is a 72 y/o F w/ a hx of tobacco use (60 yrs, quit 2 months ago), asbestosis exposure, pleural calcifications, COPD, who presented with headache and SOB. Patient was found to be COVID-19 positive, she underwent CXR which showed Right opacity, thus she underwent CT Chest which showed RML opacity, thus pulmonary medicine was consulted to help with the management. On my exam patient was in room 4485 she was on RA, she states after getting abx and fluids she feels almost back to baseline, she still has cough but has been unproductive.     ROS: negative except stated above    PMH:  has a past medical history of Acne, Bilateral malignant neoplasm of breast in female, unspecified estrogen receptor status, unspecified site of breast (HCC), Breast cancer (HCC), Cellulitis, COPD (chronic obstructive pulmonary disease) (HCC), COPD (chronic obstructive pulmonary disease) (HCC), COPD (chronic obstructive pulmonary disease) (HCC), Essential tremor, GERD (gastroesophageal reflux disease), Hyperlipidemia, Hyperlipidemia, Osteoporosis, Polyp of colon, and Renal mass, right.   PSH:  has a past surgical history that includes Appendectomy; Dilation and curettage of uterus; Breast surgery; Hand surgery; eye surgery (Right, 03/21/2015); Mastectomy, bilateral (Bilateral, 1985); Colonoscopy (N/A, 05/02/2019); ORIF DISTAL RADIUS FRACTURE (Right, 09/10/2019); IR KYPHOPLASTY LUMBAR 1 VERTEBRAL BODY (03/04/2021); total nephrectomy (Right, 02/07/2023); partial nephrectomy (Right, 2/7/2023); and US BREAST BIOPSY W LOC DEVICE 1ST LESION RIGHT (Right, 11/14/2023).    SH:  reports that she quit smoking about 14 years ago. Her smoking use included cigarettes. She started smoking about 44 years ago. She has a 30 pack-year smoking history. She has never

## 2024-09-25 ENCOUNTER — CARE COORDINATION (OUTPATIENT)
Dept: CASE MANAGEMENT | Age: 74
End: 2024-09-25

## 2024-09-25 ENCOUNTER — TELEPHONE (OUTPATIENT)
Dept: FAMILY MEDICINE CLINIC | Age: 74
End: 2024-09-25

## 2024-09-25 DIAGNOSIS — R06.02 SOB (SHORTNESS OF BREATH): Primary | ICD-10-CM

## 2024-09-25 PROCEDURE — 1111F DSCHRG MED/CURRENT MED MERGE: CPT | Performed by: FAMILY MEDICINE

## 2024-09-25 RX ORDER — ONDANSETRON 4 MG/1
4 TABLET, ORALLY DISINTEGRATING ORAL 3 TIMES DAILY PRN
Qty: 21 TABLET | Refills: 0 | Status: SHIPPED | OUTPATIENT
Start: 2024-09-25

## 2024-09-26 ENCOUNTER — CARE COORDINATION (OUTPATIENT)
Dept: CASE MANAGEMENT | Age: 74
End: 2024-09-26

## 2024-09-27 ENCOUNTER — CARE COORDINATION (OUTPATIENT)
Dept: CASE MANAGEMENT | Age: 74
End: 2024-09-27

## 2024-09-27 NOTE — PROGRESS NOTES
Physician Progress Note      PATIENT:               TERESE MARTINEZ  CSN #:                  [id number]  :                       1950  ADMIT DATE:       2024 11:17 AM  DISCH DATE:        2024 12:42 PM  RESPONDING  PROVIDER #:        Jeremiah Bright MD          QUERY TEXT:    Patient admitted with COVID-19. Documentation reflects atelectasis in   pulmonary consult on 24.  If possible, please document in the progress   notes and discharge summary if atelectasis was:    The medical record reflects the following:  Risk Factors: Age 72yo. Hx- COPD and asbestos exposure, former smoker, rt   kidney removed d/t ca,  Clinical Indicators: CT Chest- Airspace changes at the right lung base   described on the prior chest radiograph correlate to the right middle lobe   favoring a combination of atelectasis and postobstructive pneumonitis....Per   Pulmonary consult on 24-RML opacity - likely atelectasis  Treatment: mucus clearance methods, flutter valve, IS, 7 days of doxy 100 BID    Thank You,  Dena Harmon RN BSN CDS CRCR  [email or web address]  Options provided:  -- Atelectasis confirmed after study  -- Atelectasis ruled out after study  -- Other - I will add my own diagnosis  -- Disagree - Not applicable / Not valid  -- Disagree - Clinically unable to determine / Unknown  -- Refer to Clinical Documentation Reviewer    PROVIDER RESPONSE TEXT:    er Pulmonary consult on 24-RML opacity - likely atelectasis    Query created by: Dena Harmon on 2024 7:13 AM      Electronically signed by:  Jeremiah Bright MD 2024 12:59 PM

## 2024-09-30 ENCOUNTER — CARE COORDINATION (OUTPATIENT)
Dept: CASE MANAGEMENT | Age: 74
End: 2024-09-30

## 2024-09-30 NOTE — CARE COORDINATION
After two requests, PCP office has not moved the PCP hospital follow up appointment to an earlier date to meet the office's KPI. There is a pulmonology appointment scheduled on discharge day #14 that is scheduled as a hospital follow up.

## 2024-10-02 ENCOUNTER — CARE COORDINATION (OUTPATIENT)
Dept: CASE MANAGEMENT | Age: 74
End: 2024-10-02

## 2024-10-02 DIAGNOSIS — U07.1 COVID-19: ICD-10-CM

## 2024-10-02 DIAGNOSIS — J43.9 PULMONARY EMPHYSEMA, UNSPECIFIED EMPHYSEMA TYPE (HCC): Primary | ICD-10-CM

## 2024-10-02 NOTE — PROGRESS NOTES
Remote Patient Monitoring Treatment Plan    Received request from ACM/CTSusi Rios RN   to order remote patient monitoring for in home monitoring of Covid; Condition managed by Dr. Rojelio Bell ([facility]).  COPD; Condition managed by Dr. Bell.  and order completed.     Patient will be monitoring blood pressure   pulse ox   survey questions.      Patient will engage in Remote Patient Monitoring each day to develop the skills necessary for self management.       RPM Care Team Responsibilities:   Alerts will be reviewed daily and addressed within 2-4 hours during operational hours (Monday -Friday 9 am-4 pm)  Alert response and intervention documented in patient medical record  Alert response escalated to PCP per protocol and documented in patient medical record  Patient monitored over approximately  days  Discharge from program based on self-management readiness    See care coordination encounters for additional details.

## 2024-10-03 ENCOUNTER — CARE COORDINATION (OUTPATIENT)
Dept: PRIMARY CARE CLINIC | Age: 74
End: 2024-10-03

## 2024-10-03 NOTE — CARE COORDINATION
Remote Patient Kit Ordering Note      Date/Time:  10/3/2024 10:07 AM      [facility] placed phone call to patient/family today to notify of RPM kit order; patient/family was unavailable; Left HIPAA compliant voicemail regarding RPM kit.    [] [facility] confirmed patient shipping address  [x] Patient will receive package over the next 1-3 business days. Someone 21 years or older must be present to sign for UPS delivery.  [x] HRS will contact patient within 24 hours, an HRS  will call the patient directly: If the patient does not answer, HRS will follow up with the clinical team notifying them about the unsuccessful attempt to contact the patient. HRS will make three call attempts to the patient.Provide patient with [facility] Virtual install number is: [phone].  [x] LPN will contact patient once equipment is active to welcome them to the program.                                                         [x] Hours of RPM monitoring - Monday-Friday 0850-0281; encourage patient to get vitals entered by Noon each day to have the alert addressed same day.  [x][facility] mailed RPM Patient flyer to patient.                      LPN made aware the RPM kit has been ordered.

## 2024-10-08 ENCOUNTER — HOSPITAL ENCOUNTER (OUTPATIENT)
Dept: GENERAL RADIOLOGY | Age: 74
Discharge: HOME | End: 2024-10-08
Payer: MEDICARE

## 2024-10-08 ENCOUNTER — HOSPITAL ENCOUNTER (OUTPATIENT)
Age: 74
Discharge: HOME | End: 2024-10-08
Payer: MEDICARE

## 2024-10-08 ENCOUNTER — OFFICE VISIT (OUTPATIENT)
Dept: PULMONOLOGY | Age: 74
End: 2024-10-08
Payer: MEDICARE

## 2024-10-08 VITALS
WEIGHT: 122 LBS | BODY MASS INDEX: 22.45 KG/M2 | DIASTOLIC BLOOD PRESSURE: 72 MMHG | HEART RATE: 64 BPM | OXYGEN SATURATION: 98 % | HEIGHT: 62 IN | SYSTOLIC BLOOD PRESSURE: 112 MMHG

## 2024-10-08 DIAGNOSIS — Z12.2 SCREENING FOR LUNG CANCER: ICD-10-CM

## 2024-10-08 DIAGNOSIS — J92.9 PLEURAL PLAQUE: ICD-10-CM

## 2024-10-08 DIAGNOSIS — Z86.16 HISTORY OF COVID-19: ICD-10-CM

## 2024-10-08 DIAGNOSIS — Z77.090 ASBESTOS EXPOSURE: ICD-10-CM

## 2024-10-08 DIAGNOSIS — F17.201 TOBACCO USE DISORDER, SEVERE, IN EARLY REMISSION: ICD-10-CM

## 2024-10-08 DIAGNOSIS — C64.1 MALIGNANT NEOPLASM OF RIGHT KIDNEY, EXCEPT RENAL PELVIS (HCC): ICD-10-CM

## 2024-10-08 DIAGNOSIS — J98.11 ATELECTASIS: ICD-10-CM

## 2024-10-08 DIAGNOSIS — J44.9 CHRONIC OBSTRUCTIVE PULMONARY DISEASE, UNSPECIFIED COPD TYPE (HCC): Primary | ICD-10-CM

## 2024-10-08 PROCEDURE — 1123F ACP DISCUSS/DSCN MKR DOCD: CPT | Performed by: STUDENT IN AN ORGANIZED HEALTH CARE EDUCATION/TRAINING PROGRAM

## 2024-10-08 PROCEDURE — G8428 CUR MEDS NOT DOCUMENT: HCPCS | Performed by: STUDENT IN AN ORGANIZED HEALTH CARE EDUCATION/TRAINING PROGRAM

## 2024-10-08 PROCEDURE — 71046 X-RAY EXAM CHEST 2 VIEWS: CPT

## 2024-10-08 PROCEDURE — 3017F COLORECTAL CA SCREEN DOC REV: CPT | Performed by: STUDENT IN AN ORGANIZED HEALTH CARE EDUCATION/TRAINING PROGRAM

## 2024-10-08 PROCEDURE — 3023F SPIROM DOC REV: CPT | Performed by: STUDENT IN AN ORGANIZED HEALTH CARE EDUCATION/TRAINING PROGRAM

## 2024-10-08 PROCEDURE — G8399 PT W/DXA RESULTS DOCUMENT: HCPCS | Performed by: STUDENT IN AN ORGANIZED HEALTH CARE EDUCATION/TRAINING PROGRAM

## 2024-10-08 PROCEDURE — G8484 FLU IMMUNIZE NO ADMIN: HCPCS | Performed by: STUDENT IN AN ORGANIZED HEALTH CARE EDUCATION/TRAINING PROGRAM

## 2024-10-08 PROCEDURE — 1036F TOBACCO NON-USER: CPT | Performed by: STUDENT IN AN ORGANIZED HEALTH CARE EDUCATION/TRAINING PROGRAM

## 2024-10-08 PROCEDURE — 1111F DSCHRG MED/CURRENT MED MERGE: CPT | Performed by: STUDENT IN AN ORGANIZED HEALTH CARE EDUCATION/TRAINING PROGRAM

## 2024-10-08 PROCEDURE — G0296 VISIT TO DETERM LDCT ELIG: HCPCS | Performed by: STUDENT IN AN ORGANIZED HEALTH CARE EDUCATION/TRAINING PROGRAM

## 2024-10-08 PROCEDURE — 99214 OFFICE O/P EST MOD 30 MIN: CPT | Performed by: STUDENT IN AN ORGANIZED HEALTH CARE EDUCATION/TRAINING PROGRAM

## 2024-10-08 PROCEDURE — 1090F PRES/ABSN URINE INCON ASSESS: CPT | Performed by: STUDENT IN AN ORGANIZED HEALTH CARE EDUCATION/TRAINING PROGRAM

## 2024-10-08 PROCEDURE — G8420 CALC BMI NORM PARAMETERS: HCPCS | Performed by: STUDENT IN AN ORGANIZED HEALTH CARE EDUCATION/TRAINING PROGRAM

## 2024-10-08 RX ORDER — TIOTROPIUM BROMIDE 18 UG/1
18 CAPSULE ORAL; RESPIRATORY (INHALATION) DAILY
Qty: 90 CAPSULE | Refills: 1 | Status: SHIPPED | OUTPATIENT
Start: 2024-10-08

## 2024-10-08 RX ORDER — ALBUTEROL SULFATE 90 UG/1
2 INHALANT RESPIRATORY (INHALATION) 4 TIMES DAILY PRN
Qty: 18 G | Refills: 5 | Status: SHIPPED | OUTPATIENT
Start: 2024-10-08

## 2024-10-08 NOTE — PROGRESS NOTES
Pulmonary and Critical Care Medicine    Date: 10/8/2024  CC: f/u atelectasis, tobacco use      HPI     HPI: Ms. Kwabena Merida is a 73 y.o. female with a PMH of tobacco use (60 yrs, quit 2 months ago), asbestosis exposure, pleural calcifications, COPD whom I had the pleasure of seeing during her hospital stay around 9/24 for SOB, COVID-19 infection, and RML atelectasis. During the hospitalization she had a CT chest which showed atelectasis of the RML she was given 7 days of abx, she was given flutter valve which she felt she cleared some sputum. Today she presents as a follow up, finished abx, she is back at baseline, she has quit smoking 2 months ago and continuing to refrain. She was diagnosed with COPD with > 10 yrs, previously she was on inhalers unaware which ones, however for > 1 yr ago she has not been on inhalers. Prior to last hospitalization she has not needed any hospitalization for breathing issues.     ROS: negative except stated above     PMH:  has a past medical history of Acne, Bilateral malignant neoplasm of breast in female, unspecified estrogen receptor status, unspecified site of breast (HCC), Breast cancer (HCC), Cellulitis, COPD (chronic obstructive pulmonary disease) (HCC), COPD (chronic obstructive pulmonary disease) (HCC), COPD (chronic obstructive pulmonary disease) (HCC), Essential tremor, GERD (gastroesophageal reflux disease), Hyperlipidemia, Hyperlipidemia, Osteoporosis, Polyp of colon, and Renal mass, right.   PSH:  has a past surgical history that includes Appendectomy; Dilation and curettage of uterus; Breast surgery; Hand surgery; eye surgery (Right, 03/21/2015); Mastectomy, bilateral (Bilateral, 1985); Colonoscopy (N/A, 05/02/2019); ORIF DISTAL RADIUS FRACTURE (Right, 09/10/2019); IR KYPHOPLASTY LUMBAR 1 VERTEBRAL BODY (03/04/2021); total nephrectomy (Right, 02/07/2023); partial nephrectomy (Right, 2/7/2023); and US BREAST BIOPSY W LOC DEVICE 1ST LESION RIGHT (Right, 11/14/2023).

## 2024-10-09 ENCOUNTER — OFFICE VISIT (OUTPATIENT)
Dept: FAMILY MEDICINE CLINIC | Age: 74
End: 2024-10-09

## 2024-10-09 ENCOUNTER — CARE COORDINATION (OUTPATIENT)
Dept: CASE MANAGEMENT | Age: 74
End: 2024-10-09

## 2024-10-09 VITALS
DIASTOLIC BLOOD PRESSURE: 82 MMHG | OXYGEN SATURATION: 97 % | SYSTOLIC BLOOD PRESSURE: 127 MMHG | HEART RATE: 69 BPM | WEIGHT: 121 LBS | BODY MASS INDEX: 22.13 KG/M2

## 2024-10-09 DIAGNOSIS — E78.5 HYPERLIPIDEMIA, UNSPECIFIED HYPERLIPIDEMIA TYPE: ICD-10-CM

## 2024-10-09 DIAGNOSIS — J43.9 PULMONARY EMPHYSEMA, UNSPECIFIED EMPHYSEMA TYPE (HCC): Primary | ICD-10-CM

## 2024-10-09 DIAGNOSIS — M81.8 OTHER OSTEOPOROSIS WITHOUT CURRENT PATHOLOGICAL FRACTURE: ICD-10-CM

## 2024-10-09 DIAGNOSIS — J92.0 PLEURAL PLAQUE WITH PRESENCE OF ASBESTOS: ICD-10-CM

## 2024-10-09 DIAGNOSIS — C50.912 BILATERAL MALIGNANT NEOPLASM OF BREAST IN FEMALE, UNSPECIFIED ESTROGEN RECEPTOR STATUS, UNSPECIFIED SITE OF BREAST (HCC): ICD-10-CM

## 2024-10-09 DIAGNOSIS — Z78.0 MENOPAUSE: ICD-10-CM

## 2024-10-09 DIAGNOSIS — G25.0 BENIGN ESSENTIAL TREMOR: ICD-10-CM

## 2024-10-09 DIAGNOSIS — C50.911 BILATERAL MALIGNANT NEOPLASM OF BREAST IN FEMALE, UNSPECIFIED ESTROGEN RECEPTOR STATUS, UNSPECIFIED SITE OF BREAST (HCC): ICD-10-CM

## 2024-10-09 RX ORDER — PRIMIDONE 50 MG/1
TABLET ORAL
Qty: 270 TABLET | Refills: 3 | Status: SHIPPED | OUTPATIENT
Start: 2024-10-09

## 2024-10-09 SDOH — ECONOMIC STABILITY: FOOD INSECURITY: WITHIN THE PAST 12 MONTHS, YOU WORRIED THAT YOUR FOOD WOULD RUN OUT BEFORE YOU GOT MONEY TO BUY MORE.: NEVER TRUE

## 2024-10-09 SDOH — ECONOMIC STABILITY: FOOD INSECURITY: WITHIN THE PAST 12 MONTHS, THE FOOD YOU BOUGHT JUST DIDN'T LAST AND YOU DIDN'T HAVE MONEY TO GET MORE.: NEVER TRUE

## 2024-10-09 ASSESSMENT — PATIENT HEALTH QUESTIONNAIRE - PHQ9
SUM OF ALL RESPONSES TO PHQ QUESTIONS 1-9: 0
SUM OF ALL RESPONSES TO PHQ9 QUESTIONS 1 & 2: 0
SUM OF ALL RESPONSES TO PHQ QUESTIONS 1-9: 0
1. LITTLE INTEREST OR PLEASURE IN DOING THINGS: NOT AT ALL
SUM OF ALL RESPONSES TO PHQ QUESTIONS 1-9: 0
2. FEELING DOWN, DEPRESSED OR HOPELESS: NOT AT ALL
SUM OF ALL RESPONSES TO PHQ QUESTIONS 1-9: 0

## 2024-10-09 NOTE — PROGRESS NOTES
malignant neoplasm of breast in female, unspecified estrogen receptor status, unspecified site of breast (HCC) 2023    Breast cancer (HCC)     Cellulitis     COPD (chronic obstructive pulmonary disease) ([facility])     COPD (chronic obstructive pulmonary disease) ([facility]) 2011    COPD (chronic obstructive pulmonary disease) ([facility]) 2011    Essential tremor     GERD (gastroesophageal reflux disease)     Hyperlipidemia     Hyperlipidemia     Osteoporosis     Polyp of colon     Renal mass, right 2022       Social History     Tobacco Use    Smoking status: Former     Current packs/day: 0.00     Average packs/day: 1 pack/day for 30.0 years (30.0 ttl pk-yrs)     Types: Cigarettes     Start date: 3/1/1980     Quit date: 3/1/2010     Years since quittin.6    Smokeless tobacco: Never    Tobacco comments:     H.O.smoking 1 p.p.d x 30+ yrs / Quit 3/1/2010   Vaping Use    Vaping status: Former    Quit date: 3/30/2022   Substance Use Topics    Alcohol use: Yes     Alcohol/week: 1.0 standard drink of alcohol     Types: 1 Cans of beer per week     Comment: 1 beer/mo.    Drug use: Never       Family History   Problem Relation Age of Onset    Heart Disease Mother        Allergies   Allergen Reactions    Vicodin [Hydrocodone-Acetaminophen] Shortness Of Breath     Patient states that she was given too much pain medication during her ED visit which caused her to have shortness of breath and dizziness    Molds & Smuts Other (See Comments)     congestion    Montelukast Sodium Other (See Comments)     Vision issues    Pollen Extract Other (See Comments)     congestion       OBJECTIVE:  /82   Pulse 69   Wt 54.9 kg (121 lb)   SpO2 97%   BMI 22.13 kg/m²   GEN:  in NAD  HEENT:  NCAT, TMs:normal and throat: clear  NECK:  Supple without adenopathy.  CV:  Regular rate and rhythm, S1 and S2 normal, no murmurs, clicks  PULM:  Chest is clear, no wheezing ,  symmetric air entry throughout both lung fields.  ABD: Soft,

## 2024-10-11 ENCOUNTER — CARE COORDINATION (OUTPATIENT)
Dept: CASE MANAGEMENT | Age: 74
End: 2024-10-11

## 2024-10-11 ENCOUNTER — CARE COORDINATION (OUTPATIENT)
Dept: CARE COORDINATION | Age: 74
End: 2024-10-11

## 2024-10-11 NOTE — CARE COORDINATION
CTN received a message from  indicating they have not been able to connect with patient.  CTN reached out and left a HIPAA compliant VM with contact information and request for return call. CTN will continue with outreach follow up calls.

## 2024-10-11 NOTE — CARE COORDINATION
Remote Patient Monitoring Note      Date/Time:  10/11/2024 8:34 AM  Patient Current Location: [place]  LPN contacted patient by telephone regarding red alert received for survey response (Pt reports increased coughing in the last 24 hours ).   Attempted to reach- first call someone answered and hung up  Called again, no answer, left HIPAA compliant VM  Plan/Follow Up: Will continue to review, monitor and address alerts with follow up based on severity of symptoms and risk factors.

## 2024-10-14 ENCOUNTER — CARE COORDINATION (OUTPATIENT)
Dept: CASE MANAGEMENT | Age: 74
End: 2024-10-14

## 2024-10-14 NOTE — CARE COORDINATION
Care Transitions Note    Follow Up Call     Patient Current Location:  Home: [address] [place] [id number]    Care Transition Nurse contacted the patient by telephone. Verified name and  as identifiers.    Additional needs identified to be addressed with provider   No needs identified                 Method of communication with provider: none.    Care Summary Note: Patient reports that she is doing well, denies any SOB or difficulty breathing.  She did have some increased coughing last week, Dr. Palmer has explained to her that she may have some coughing for a few weeks following COVID.  Her inhalers should be delivered tomorrow from mail order and she thinks this will be helpful.  Her oxygen saturations have been in the high 90's and she denies any questions or concerns at this time.  CTN will continue with outreach follow up calls.    Plan of care updates since last contact:  Education: .  Review of patient management of conditions/medications: .       Advance Care Planning:   Does patient have an Advance Directive: reviewed during previous call, see note. .    Medication Review:  No changes since last call.     Remote Patient Monitoring:  Offered patient enrollment in the Remote Patient Monitoring (RPM) program for in-home monitoring: Yes, patient enrolled; current status is activated and monitoring.    Assessments:  Care Transitions Subsequent and Final Call    Subsequent and Final Calls  Do you have any ongoing symptoms?: No  Have your medications changed?: No  Do you have any questions related to your medications?: No  Do you currently have any active services?: No  Do you have any needs or concerns that I can assist you with?: No  Identified Barriers: None  Care Transitions Interventions  Other Interventions:              Follow Up Appointment:   Reviewed upcoming appointment(s).  Future Appointments         Provider Specialty Dept Phone    2024 11:00 AM (Arrive by 10:30 AM) [facility] 1

## 2024-10-16 ENCOUNTER — CARE COORDINATION (OUTPATIENT)
Dept: CARE COORDINATION | Age: 74
End: 2024-10-16

## 2024-10-16 ENCOUNTER — CARE COORDINATION (OUTPATIENT)
Dept: CASE MANAGEMENT | Age: 74
End: 2024-10-16

## 2024-10-16 NOTE — CARE COORDINATION
Received message from  indicating that she had tried to reach the patient x three attempts today and could not reach patient for RPM alert.  Patient reported that she was not sure how she missed these calls.    CTN attempted to pull up the RPM dashboard and it would not load.  CTN exited browser and attempted again and it continues not to load,  just continues to clock.    CTN reached out to patient and she said that she is feeling fine and that it may have been her BP that alerted earlier, it was 160's/90's.  RPM dashboard is not available to CTN to determine cause for red alert.  CTN requested that she check it again and it is currently 130/85.  Patient reports that she is feeling well and denies any symptoms or concerns at this time.    CTN will continue with outreach follow up calls.

## 2024-10-16 NOTE — CARE COORDINATION
Date/Time:  10/16/2024 8:55 AM  LPN attempted to reach patient by telephone regarding red alert in remote patient monitoring program. Left HIPPA compliant message requesting a return call. Will attempt to reach patient again.

## 2024-10-18 ENCOUNTER — CARE COORDINATION (OUTPATIENT)
Dept: CASE MANAGEMENT | Age: 74
End: 2024-10-18

## 2024-10-18 ENCOUNTER — CARE COORDINATION (OUTPATIENT)
Dept: CARE COORDINATION | Age: 74
End: 2024-10-18

## 2024-10-18 VITALS
BODY MASS INDEX: 22.75 KG/M2 | WEIGHT: 124.4 LBS | DIASTOLIC BLOOD PRESSURE: 89 MMHG | SYSTOLIC BLOOD PRESSURE: 133 MMHG | OXYGEN SATURATION: 98 % | HEART RATE: 90 BPM

## 2024-10-18 NOTE — CARE COORDINATION
2nd outreach, no answer, left VM    Attempted EC on file, Fish, no answer and vm option did not come up. Will notify ACM per protocol, pt answered \"yes\" to worsening cough in the last 24 hours

## 2024-10-18 NOTE — CARE COORDINATION
Remote Patient Monitoring Note      Date/Time:  10/18/2024 8:52 AM  Patient Current Location: [place]  LPN contacted patient by telephone regarding red alert received for survey response (\"pt reports yes to new or worsening cough in the last 24 hours\"). Left HIPAA compliant VM  Plan/Follow Up: Will continue to review, monitor and address alerts with follow up based on severity of symptoms and risk factors.

## 2024-10-18 NOTE — CARE COORDINATION
Care Transitions Note    Follow Up Call     Attempted to reach patient for transitions of care follow up.  Unable to reach patient.      Outreach Attempts:   HIPAA compliant voicemail left for patient.     Care Summary Note: CTN received a routed message from STEWART Overton  about red alert, increased coughing reported on RPM survey questions.  She also asked CTN to let patient know the phone number that she will be calling from in order for patient to know that it is a [facility] contact number and she will be more likely to answer.    CTN attempted outreach and left a VM for patient.  CTN explained that we were trying to reach her and provided the contact numbers that we would be calling from ().  CTN will continue with outreach follow up calls.    Follow Up Appointment:   Future Appointments         Provider Specialty Dept Phone    11/2/2024 11:00 AM (Arrive by 10:30 AM) [facility] 1 Radiology [phone]    1/8/2025 9:20 AM Rojelio Bell MD Pulmonology [phone]    1/9/2025 10:00 AM Lobo Palmer MD Family Medicine [phone]            Plan for follow-up call in 2-5 days based on severity of symptoms and risk factors. Plan for next call: symptom management-.  self management-.  follow-up appointment-.    CARMELO BRADSHAW RN

## 2024-10-22 ENCOUNTER — CARE COORDINATION (OUTPATIENT)
Dept: OTHER | Facility: CLINIC | Age: 74
End: 2024-10-22

## 2024-10-23 ENCOUNTER — CARE COORDINATION (OUTPATIENT)
Dept: CASE MANAGEMENT | Age: 74
End: 2024-10-23

## 2024-10-23 NOTE — CARE COORDINATION
Patient reached out to CTN and stated that she is camping until Friday and will need to pause the RPM system.  CTN will notify RPM pool.

## 2024-10-24 ENCOUNTER — CARE COORDINATION (OUTPATIENT)
Dept: CASE MANAGEMENT | Age: 74
End: 2024-10-24

## 2024-10-24 NOTE — CARE COORDINATION
Care Transitions Note    Final Call     Attempted to reach patient for transitions of care follow up.  Unable to reach patient, left VM.    Outreach Attempts:   HIPAA compliant voicemail left for patient.     Patient graduated from the Care Transitions program on 10/24/24.  Patient/family has the ability to self manage at this time..      Handoff:   RPM and condition management for COVID, COPD.     Care Summary Note: CTN attempted a final outreach call, no answer, left  VM for patient with ACM name that will be taking over case.  CTN also left contact information and requested a call if patient has any concerns or questions.  CTN will transition to [facility], Gregoria Joe, notify RPM, close program & remain available.    Assessments:  Care Transitions Subsequent and Final Call    Subsequent and Final Calls  Care Transitions Interventions  Other Interventions:              Upcoming Appointments:    Future Appointments         Provider Specialty Dept Phone    11/2/2024 11:00 AM (Arrive by 10:30 AM) [facility] 1 Radiology [phone]    1/8/2025 9:20 AM Rojelio Bell MD Pulmonology [phone]    1/9/2025 10:00 AM Lobo Palmer MD Family Medicine [phone]            CARMELO BRADSHAW RN

## 2024-10-24 NOTE — CARE COORDINATION
Care Transitions Note    Final Call     Patient Current Location:  Home: [address] [place] [id number]    Care Transition Nurse contacted the patient by telephone. Verified name and  as identifiers.    Patient graduated from the Care Transitions program on 10/24/24.  Patient/family verbalizes confidence in the ability to self-manage at this time..      Advance Care Planning:   Does patient have an Advance Directive: reviewed during previous call, see note. .    Handoff:   RPM and condition management for COPD, COVID.     Care Summary Note: Patient returned call and reports that she returned from [facility] early.  She is not having any SOB, does have clear, productive cough.  She is going to call Dr. Palmer today to request some cough medicine.  She is also home early from [facility] and would like for RPM to resume.  CTN will notify RPM staff.  Patient denies any other questions or concerns at this time.  CTN will transition to ACM, close program & remain available.    Assessments:  Care Transitions Subsequent and Final Call    Subsequent and Final Calls  Do you have any ongoing symptoms?: No  Have your medications changed?: No  Do you have any questions related to your medications?: No  Do you currently have any active services?: No  Do you have any needs or concerns that I can assist you with?: No  Identified Barriers: None  Care Transitions Interventions  Other Interventions:              Upcoming Appointments:    Future Appointments         Provider Specialty Dept Phone    2024 11:00 AM (Arrive by 10:30 AM) [facility] 1 Radiology [phone]    2025 9:20 AM Rojelio Bell MD Pulmonology [phone]    2025 10:00 AM Lobo Palmer MD Family Medicine [phone]            Patient has agreed to contact primary care provider and/or specialist for any further questions, concerns, or needs.    CARMELO BRADSHAW, RN

## 2024-10-25 ENCOUNTER — CARE COORDINATION (OUTPATIENT)
Dept: CARE COORDINATION | Age: 74
End: 2024-10-25

## 2024-10-25 NOTE — CARE COORDINATION
Ambulatory Care Coordination Note     10/25/2024 11:44 AM     ACM outreach attempt by this ACM today to offer care management services. ACM was unable to reach the patient by telephone today; left voice message requesting a return phone call to this ACM.     ACM: Gregoria Joe RN     Care Summary Note: CTN handoff     PCP/Specialist follow up:   Future Appointments         Provider Specialty Dept Phone    11/2/2024 11:00 AM (Arrive by 10:30 AM) [facility] 1 Radiology [phone]    1/8/2025 9:20 AM Rojelio Bell MD Pulmonology [phone]    1/9/2025 10:00 AM Lobo Palmer MD Family Medicine [phone]

## 2024-10-28 ENCOUNTER — CARE COORDINATION (OUTPATIENT)
Dept: CASE MANAGEMENT | Age: 74
End: 2024-10-28

## 2024-10-28 ENCOUNTER — CARE COORDINATION (OUTPATIENT)
Dept: CARE COORDINATION | Age: 74
End: 2024-10-28

## 2024-10-28 RX ORDER — VERAPAMIL HYDROCHLORIDE 120 MG/1
120 TABLET, FILM COATED, EXTENDED RELEASE ORAL DAILY
Qty: 30 TABLET | Refills: 5 | Status: SHIPPED | OUTPATIENT
Start: 2024-10-28

## 2024-10-28 RX ORDER — VERAPAMIL HYDROCHLORIDE 120 MG/1
120 TABLET, FILM COATED, EXTENDED RELEASE ORAL DAILY
Qty: 90 TABLET | OUTPATIENT
Start: 2024-10-28

## 2024-10-28 SDOH — HEALTH STABILITY: PHYSICAL HEALTH: ON AVERAGE, HOW MANY MINUTES DO YOU ENGAGE IN EXERCISE AT THIS LEVEL?: 10 MIN

## 2024-10-28 SDOH — HEALTH STABILITY: PHYSICAL HEALTH: ON AVERAGE, HOW MANY DAYS PER WEEK DO YOU ENGAGE IN MODERATE TO STRENUOUS EXERCISE (LIKE A BRISK WALK)?: 7 DAYS

## 2024-10-28 SDOH — ECONOMIC STABILITY: FOOD INSECURITY: WITHIN THE PAST 12 MONTHS, THE FOOD YOU BOUGHT JUST DIDN'T LAST AND YOU DIDN'T HAVE MONEY TO GET MORE.: NEVER TRUE

## 2024-10-28 SDOH — ECONOMIC STABILITY: FOOD INSECURITY: WITHIN THE PAST 12 MONTHS, YOU WORRIED THAT YOUR FOOD WOULD RUN OUT BEFORE YOU GOT MONEY TO BUY MORE.: NEVER TRUE

## 2024-10-28 SDOH — ECONOMIC STABILITY: INCOME INSECURITY: IN THE LAST 12 MONTHS, WAS THERE A TIME WHEN YOU WERE NOT ABLE TO PAY THE MORTGAGE OR RENT ON TIME?: NO

## 2024-10-28 ASSESSMENT — SOCIAL DETERMINANTS OF HEALTH (SDOH): HOW HARD IS IT FOR YOU TO PAY FOR THE VERY BASICS LIKE FOOD, HOUSING, MEDICAL CARE, AND HEATING?: SOMEWHAT HARD

## 2024-10-28 NOTE — TELEPHONE ENCOUNTER
Medication:   Requested Prescriptions     Pending Prescriptions Disp Refills    verapamil (CALAN SR) 120 MG extended release tablet [Pharmacy Med Name: VERAPAMIL ER 120MG TABLETS] 90 tablet      Sig: TAKE 1 TABLET BY MOUTH DAILY       Last Filled:  10/28/2024    Patient Phone Number: [phone] (home)     Last appt: 10/9/2024   Next appt: 1/9/2025    Lab Results   Component Value Date     09/24/2024    K 3.8 09/24/2024     09/24/2024    CO2 20 (L) 09/24/2024    BUN 8 09/24/2024    CREATININE 0.8 09/24/2024    GLUCOSE 76 09/24/2024    CALCIUM 8.6 09/24/2024    BILITOT 0.4 09/23/2024    ALKPHOS 77 09/23/2024    AST 15 09/23/2024    ALT 10 09/23/2024    LABGLOM 76 09/24/2024    GFRAA >60 03/18/2022    AGRATIO 1.4 09/23/2024    GLOB 1.9 04/21/2021

## 2024-10-28 NOTE — CARE COORDINATION
Date/Time:  10/28/2024 8:27 AM  LPN attempted to reach patient by telephone regarding red alerts in remote patient monitoring program for BP of 148/103, and HR of 121 bpm.. Left HIPPA compliant message requesting a return call. Will attempt to reach patient again.     María Orta LPN, PCC, Remote Patient Monitoring    PH: [phone]  Email: [email or web address]

## 2024-10-28 NOTE — CARE COORDINATION
Dr. Palmer : Let patient know I sent a blood pressure medication to her local pharmacy.  She is to take it once a day continue to monitor her home vital signs, and make an appointment to see me in the next 1 to 2 weeks for follow-up     Patient called and left detailed voicemail with information above.   
Patient Monitoring (RPM) program for in-home monitoring: Yes, patient enrolled; current status is activated and monitoring.     Assessments Completed:   COPD Assessment    Does the patient understand envrionmental exposure?: No  Is the patient able to verbalize Rescue vs. Long Acting medications?: Yes  Does the patient have a nebulizer?: Yes  Does the patient use a space with inhaled medications?: No     No patient-reported symptoms         Symptoms:           ,   General Assessment    Do you have any symptoms that are causing concern?: No          Medications Reviewed:   Completed during this call    Advance Care Planning:   Not reviewed during this call     Care Planning:   Education Documentation  Educate transfer safety, taught by Gregoria Joe RN at 10/28/2024 11:19 AM.  Learner: Patient  Readiness: Acceptance  Method: Explanation  Response: Verbalizes Understanding    Provide High Risk Information for Falls Program, taught by Gregoria Joe RN at 10/28/2024 11:19 AM.  Learner: Patient  Readiness: Acceptance  Method: Explanation  Response: Verbalizes Understanding    Educate medication side effects, taught by Gregoria Joe RN at 10/28/2024 11:19 AM.  Learner: Patient  Readiness: Acceptance  Method: Explanation  Response: Verbalizes Understanding    Educate safety precautions, taught by Gregoria Joe RN at 10/28/2024 11:19 AM.  Learner: Patient  Readiness: Acceptance  Method: Explanation  Response: Verbalizes Understanding    Educate home safety, taught by Gregoria Joe RN at 10/28/2024 11:19 AM.  Learner: Patient  Readiness: Acceptance  Method: Explanation  Response: Verbalizes Understanding    COGNITIVE : FALLS-RISK OF, taught by Gregoria Joe RN at 10/28/2024 11:19 AM.  Learner: Patient  Readiness: Acceptance  Method: Explanation  Response: Verbalizes Understanding    COGNITIVE : FALLS-RISK OF, taught by Gregoria Joe RN at 10/28/2024 11:19 AM.  Learner: Patient  Readiness: Acceptance  Method: Explanation  Response:

## 2024-10-29 ENCOUNTER — CARE COORDINATION (OUTPATIENT)
Dept: CARE COORDINATION | Age: 74
End: 2024-10-29

## 2024-10-29 NOTE — CARE COORDINATION
-Remote Alert Monitoring Note      Date/Time:  10/29/2024 8:25 AM  Patient Current Location: [place]  Verified patients name and  as identifiers.    Rpm alert to be reviewed by the provider   red alert  blood pressure reading (153/120)                   LPN contacted patient by telephone regarding red alert received   Background: Pt enrolled for COPD and covid-19  Refer to 911 immediately if:  Patient unresponsive or unable to provide history  Change in cognition or sudden confusion  Patient unable to respond in complete sentences  Intense chest pain/tightness  Any concern for any clinical emergency  Red Alert: Provider response time of 1 hr required for any red alert requiring intervention  Yellow Alert: Provider response time of 3hr required for any escalated yellow alert  Patient Chief Complaint:  Blood Pressure BP Triage  Are you having any Chest Pain? no   Are you having any Shortness of Breath? yes   Do you have a headache or have any vision changes? no   Are you having any numbness or tingling? no   Are you having any other health concerns or issues? no     Clinical Interventions:  Spoke with patient, she reports she isnt feeling the best today. She is anxious that her BP has read high x3 days. Pt made aware PCP sent a rx for verapamil to her [facility] pharmacy, she reports she was not aware that it was sent yesterday. She denies CP, she does note SOB, she has a hx of COPD, o2 reading is 98%. She is speaking in full sentences. She denies headache/vision changes. She wants to rest for a little while and will recheck later this morning. She states she will send her  to the pharmacy to  new rx. Other metrics are WNL, pt is aware to go to ER for worsening SOB/CP, she VU    Plan/Follow Up: Will continue to review, monitor and address alerts with follow up based on severity of symptoms and risk factors.  **For any new or worsening symptoms or you are concerned in anyway, please contact your Provider

## 2024-11-01 ENCOUNTER — CARE COORDINATION (OUTPATIENT)
Dept: CARE COORDINATION | Age: 74
End: 2024-11-01

## 2024-11-01 NOTE — CARE COORDINATION
Ambulatory Care Coordination Note     11/1/2024 12:15 PM     ACM outreach attempt by this ACM today to perform care management follow up . ACM was unable to reach the patient by telephone today; left voice message requesting a return phone call to this ACM.     ACM: Gregoria Joe RN     Care Summary Note:     PCP/Specialist follow up:   Future Appointments         Provider Specialty Dept Phone    11/2/2024 11:00 AM (Arrive by 10:30 AM) [facility] CT  1 Radiology [phone]    1/8/2025 9:20 AM Rojelio Bell MD Pulmonology [phone]    1/9/2025 10:00 AM Lobo Palmer MD Family Medicine [phone]

## 2024-11-02 ENCOUNTER — HOSPITAL ENCOUNTER (OUTPATIENT)
Dept: CT IMAGING | Age: 74
Discharge: HOME | End: 2024-11-02
Attending: FAMILY MEDICINE
Payer: MEDICARE

## 2024-11-02 DIAGNOSIS — Z87.891 PERSONAL HISTORY OF TOBACCO USE: ICD-10-CM

## 2024-11-02 PROCEDURE — 71271 CT THORAX LUNG CANCER SCR C-: CPT

## 2024-11-05 ENCOUNTER — CARE COORDINATION (OUTPATIENT)
Dept: CARE COORDINATION | Age: 74
End: 2024-11-05

## 2024-11-05 NOTE — CARE COORDINATION
Ambulatory Care Coordination Note     11/5/2024 1:10 PM     Patient outreach attempt by this ACM today to perform care management follow up . ACM was unable to reach the patient by telephone today; left voice message requesting a return phone call to this ACM.     ACM: Gregoria Joe RN     Care Summary Note:     PCP/Specialist follow up:   Future Appointments         Provider Specialty Dept Phone    1/8/2025 9:20 AM Rojelio Bell MD Pulmonology [phone]    1/9/2025 10:00 AM Lobo Palmer MD Family Medicine [phone]

## 2024-11-07 ENCOUNTER — CARE COORDINATION (OUTPATIENT)
Dept: CARE COORDINATION | Age: 74
End: 2024-11-07

## 2024-11-07 NOTE — CARE COORDINATION
-Remote Alert Monitoring Note      Date/Time:  2024 10:05 AM  Patient Current Location: [place]  Verified patients name and  as identifiers.    Rpm alert to be reviewed by the provider   red alert  blood pressure reading (135/103)  Vitals Recheck blood pressure reading (151/118)                     LPN contacted patient by telephone regarding red alert received   Background: Pt enrolled for COPD and covid-19  Refer to 911 immediately if:  Patient unresponsive or unable to provide history  Change in cognition or sudden confusion  Patient unable to respond in complete sentences  Intense chest pain/tightness  Any concern for any clinical emergency  Red Alert: Provider response time of 1 hr required for any red alert requiring intervention  Yellow Alert: Provider response time of 3hr required for any escalated yellow alert  Patient Chief Complaint:  Blood Pressure BP Triage  Are you having any Chest Pain? no   Are you having any Shortness of Breath? Yes- chronic/hx of copd   Do you have a headache or have any vision changes? no   Are you having any numbness or tingling? no   Are you having any other health concerns or issues? no     Clinical Interventions:  Spoke with patient, she reports she is feeling a bit winded today. She has a hx of COPD. She reports she has used her rescue inhaler and her maintenance inhaler and reports some relief. She does note that she has been moving things all morning and has not rested much. She has taken her medications around 0700 this morning. She did repeat BP during call and it was still elevated at 151/118. Asked pt to relax and rest for a bit and recheck, she VU, will monitor for repeat reading.      Plan/Follow Up: Will continue to review, monitor and address alerts with follow up based on severity of symptoms and risk factors.  **For any new or worsening symptoms or you are concerned in anyway, please contact your Provider or report to the nearest Emergency Room.**

## 2024-11-11 ENCOUNTER — CARE COORDINATION (OUTPATIENT)
Dept: CASE MANAGEMENT | Age: 74
End: 2024-11-11

## 2024-11-11 NOTE — CARE COORDINATION
-Remote Alert Monitoring Note      Date/Time:  2024 8:39 AM  Patient Current Location: [place]  Verified patients name and  as identifiers.          Rpm alert to be reviewed by the provider                            LPN contacted patient by telephone regarding red alert received   Background: Pt enrolled for COPD and covid-19  Refer to 911 immediately if:  Patient unresponsive or unable to provide history  Change in cognition or sudden confusion  Patient unable to respond in complete sentences  Intense chest pain/tightness  Any concern for any clinical emergency  Red Alert: Provider response time of 1 hr required for any red alert requiring intervention  Yellow Alert: Provider response time of 3hr required for any escalated yellow alert  Patient Chief Complaint:  Blood Pressure BP Triage  Are you having any Chest Pain? no   Are you having any Shortness of Breath? Yes- chronic/hx of copd   Do you have a headache or have any vision changes? no   Are you having any numbness or tingling? no   Are you having any other health concerns or issues? no      Clinical Interventions:  LPN contacted pt in regard to RPM red alerts for BP of, 148/105, and survey response indicating an increase in SOB in the past 24 hrs. Pt denied any new, worrisome and or worsening symptoms at this time.      Writer confirmed with patient that he has no chest pain, SOB, headache, vision changes, numbness, tingling, N/V, dizziness/lightheadedness or any other concerns at this time.     Pt is speaking in complete sentences. No apparent distress noted. No audible wheezing, labored breathing or SOB.    Pt advised that she used her inhalers this morning, and that using the inhalers brought about relief. Pt's PO is 97%. Pt rechecked BP, updated BP is 142/95.     Plan/Follow Up: Will continue to review, monitor and address alerts with follow up based on severity of symptoms and risk factors.  **For any new or worsening symptoms or you are concerned

## 2024-11-13 ENCOUNTER — CARE COORDINATION (OUTPATIENT)
Dept: CARE COORDINATION | Age: 74
End: 2024-11-13

## 2024-11-13 DIAGNOSIS — J44.9 CHRONIC OBSTRUCTIVE PULMONARY DISEASE, UNSPECIFIED COPD TYPE (HCC): ICD-10-CM

## 2024-11-13 RX ORDER — ALBUTEROL SULFATE 90 UG/1
2 INHALANT RESPIRATORY (INHALATION) 4 TIMES DAILY PRN
Qty: 18 G | Refills: 5 | Status: SHIPPED | OUTPATIENT
Start: 2024-11-13

## 2024-11-13 RX ORDER — TIOTROPIUM BROMIDE 18 UG/1
18 CAPSULE ORAL; RESPIRATORY (INHALATION) DAILY
Qty: 90 CAPSULE | Refills: 1 | Status: SHIPPED | OUTPATIENT
Start: 2024-11-13

## 2024-11-13 RX ORDER — VERAPAMIL HYDROCHLORIDE 120 MG/1
120 TABLET, FILM COATED, EXTENDED RELEASE ORAL DAILY
Qty: 30 TABLET | Refills: 5 | Status: SHIPPED | OUTPATIENT
Start: 2024-11-13

## 2024-11-13 NOTE — TELEPHONE ENCOUNTER
Lov 10/9/24  Lrf 18 5 10/8/24   90 1 10/8/24   30 51 0/28/24   
Medication and Quantity requested: verapamil (CALAN SR) 120 MG extended release tablet [[id number]]      AND    albuterol sulfate HFA (VENTOLIN HFA) 108 (90 Base) MCG/ACT inhaler [[id number]]     AND    tiotropium (SPIRIVA HANDIHALER) 18 MCG inhalation capsule [[id number]]     Last Visit  10/9/24    Pharmacy and phone number updated in EPIC:  yes    Send to meds by mail         
standing/walking

## 2024-11-13 NOTE — CARE COORDINATION
Remote Patient Monitoring Note      Date/Time:  11/13/2024 8:06 AM  Patient Current Location: [facility] noted  red alert received for blood pressure heart rate (121).   Background: Pt enrolled for COPD and covid-19  Clinical Interventions:  HR BP noted at 121, pt checked pulse ox after BP and PO HR is improved at 111. Pt reports she often checks vitals after physical activity. Other metrics are WNL    Plan/Follow Up: Will continue to review, monitor and address alerts with follow up based on severity of symptoms and risk factors.

## 2024-11-18 ENCOUNTER — HOSPITAL ENCOUNTER (OUTPATIENT)
Dept: GENERAL RADIOLOGY | Age: 74
Discharge: HOME | End: 2024-11-18
Payer: MEDICARE

## 2024-11-18 ENCOUNTER — CARE COORDINATION (OUTPATIENT)
Dept: CARE COORDINATION | Age: 74
End: 2024-11-18

## 2024-11-18 ENCOUNTER — HOSPITAL ENCOUNTER (OUTPATIENT)
Age: 74
Discharge: HOME | End: 2024-11-18
Payer: MEDICARE

## 2024-11-18 ENCOUNTER — OFFICE VISIT (OUTPATIENT)
Dept: FAMILY MEDICINE CLINIC | Age: 74
End: 2024-11-18

## 2024-11-18 VITALS
BODY MASS INDEX: 21.22 KG/M2 | WEIGHT: 116 LBS | DIASTOLIC BLOOD PRESSURE: 70 MMHG | OXYGEN SATURATION: 99 % | HEART RATE: 87 BPM | SYSTOLIC BLOOD PRESSURE: 126 MMHG

## 2024-11-18 DIAGNOSIS — J44.9 CHRONIC OBSTRUCTIVE PULMONARY DISEASE, UNSPECIFIED COPD TYPE (HCC): ICD-10-CM

## 2024-11-18 DIAGNOSIS — M54.41 ACUTE BILATERAL LOW BACK PAIN WITH BILATERAL SCIATICA: Primary | ICD-10-CM

## 2024-11-18 DIAGNOSIS — M54.42 ACUTE BILATERAL LOW BACK PAIN WITH BILATERAL SCIATICA: Primary | ICD-10-CM

## 2024-11-18 PROCEDURE — 72100 X-RAY EXAM L-S SPINE 2/3 VWS: CPT

## 2024-11-18 RX ORDER — IBUPROFEN 600 MG/1
600 TABLET, FILM COATED ORAL 3 TIMES DAILY PRN
Qty: 30 TABLET | Refills: 0 | Status: SHIPPED | OUTPATIENT
Start: 2024-11-18

## 2024-11-18 RX ORDER — HYDROCODONE BITARTRATE AND ACETAMINOPHEN 5; 325 MG/1; MG/1
1 TABLET ORAL EVERY 4 HOURS PRN
Qty: 30 TABLET | Refills: 0 | Status: SHIPPED | OUTPATIENT
Start: 2024-11-18 | End: 2024-11-23

## 2024-11-18 NOTE — CARE COORDINATION
Date/Time:  11/18/2024 8:02 AM  LPN attempted to reach patient by telephone regarding red alert in remote patient monitoring program. Left HIPAA compliant message requesting a return call. Will attempt to reach patient again.

## 2024-11-18 NOTE — PROGRESS NOTES
Jenna Merida is a 73 y.o. female.    HPI:  Sudden onset low back pain 3 days ago, no strain or injury  prior h/o kyphoplasty  Pain rad down both legs, mostly on the tops of the thighs  Still smokes 3 to 5 cigarettes a day  No fever, no saddle anesthesia, no urinary incontinence  Stated that she is not truly allergic to hydrocodone just that she took too high dose and had trouble breathing once  Wt Readings from Last 3 Encounters:   11/18/24 52.6 kg (116 lb)   04/19/23 56.4 kg (124 lb 6.4 oz)   10/09/24 54.9 kg (121 lb)     Meds, vitamins and allergies reviewed with Patient    ROS:  Gen: No fever  HEENT: No cold symptoms, sore throat.  CV:  Denies chest pain or palpitations.  Pulm:  Denies shortness of breath, cough.  Abd:  Denies abdominal pain, nausea and vomiting.  Skin: no rash    Allergies   Allergen Reactions    Vicodin [Hydrocodone-Acetaminophen] Shortness Of Breath     Patient states that she was given too much pain medication during her ED visit which caused her to have shortness of breath and dizziness    Molds & Smuts Other (See Comments)     congestion    Montelukast Sodium Other (See Comments)     Vision issues    Pollen Extract Other (See Comments)     congestion       Prior to Visit Medications    Medication Sig Taking? Authorizing Provider   albuterol sulfate HFA (VENTOLIN HFA) 108 (90 Base) MCG/ACT inhaler Inhale 2 puffs into the lungs 4 times daily as needed for Wheezing Yes Lobo Palmer MD   tiotropium (SPIRIVA HANDIHALER) 18 MCG inhalation capsule Inhale 1 capsule into the lungs daily Yes Lobo Palmer MD   verapamil (CALAN SR) 120 MG extended release tablet Take 1 tablet by mouth daily Yes Lobo Palmer MD   primidone (MYSOLINE) 50 MG tablet Take 1 tablet in the morning, 1 tablet at noon and 2 tablets at night Yes Lobo Palmer MD   ondansetron (ZOFRAN-ODT) 4 MG disintegrating tablet Take 1 tablet by mouth 3 times daily as needed for Nausea or Vomiting Yes

## 2024-11-19 ENCOUNTER — HOSPITAL ENCOUNTER (OUTPATIENT)
Dept: MRI IMAGING | Age: 74
Discharge: HOME | End: 2024-11-19
Payer: MEDICARE

## 2024-11-19 DIAGNOSIS — S32.030A CLOSED COMPRESSION FRACTURE OF L3 LUMBAR VERTEBRA, INITIAL ENCOUNTER (HCC): ICD-10-CM

## 2024-11-19 DIAGNOSIS — S32.030A CLOSED COMPRESSION FRACTURE OF L3 LUMBAR VERTEBRA, INITIAL ENCOUNTER (HCC): Primary | ICD-10-CM

## 2024-11-19 PROCEDURE — 72148 MRI LUMBAR SPINE W/O DYE: CPT

## 2024-11-20 ENCOUNTER — TELEPHONE (OUTPATIENT)
Dept: INTERVENTIONAL RADIOLOGY/VASCULAR | Age: 74
End: 2024-11-20

## 2024-11-20 DIAGNOSIS — S32.030G CLOSED COMPRESSION FRACTURE OF L3 LUMBAR VERTEBRA WITH DELAYED HEALING, SUBSEQUENT ENCOUNTER: Primary | ICD-10-CM

## 2024-11-21 ENCOUNTER — CARE COORDINATION (OUTPATIENT)
Dept: CARE COORDINATION | Age: 74
End: 2024-11-21

## 2024-11-21 NOTE — CARE COORDINATION
Ambulatory Care Coordination Note     2024 12:03 PM     Patient Current Location:  Home: [address] [place] [id number]     ACM contacted the patient by telephone. Verified name and  with patient as identifiers.         ACM: Gregoria Joe RN     Challenges to be reviewed by the provider   Additional needs identified to be addressed with provider No  none               Method of communication with provider: none.    Utilization: Patient has not had any utilization since our last call.     Care Summary Note:     ACM made care coordination outreach and spoke with patient. Reported that she is having a lot of pain related to her back and kept conversation brief. Scheduled for surgery tomorrow to repair her spine. Denied any chest pain, shortness of breath or swelling. Denied any other questions or concerns at this time. Agreeable to future care coordination outreaches. ACM to follow up at a later date.       PCP/Specialist follow up:   Future Appointments         Provider Specialty Dept Phone    2024 8:30 AM (Arrive by 7:30 AM) Radiologist, [facility]; [facility] SPECIAL PROCEDURES  4 Radiology [phone]    2025 9:20 AM Rojelio Bell MD Pulmonology [phone]    2025 10:00 AM Lobo Palmer MD Family Medicine [phone]            Follow Up:   Plan for next ACM outreach in approximately 1 week to complete:  - goal progression  - education   -fu surgery   -fu bp   Patient  is agreeable to this plan.

## 2024-11-22 ENCOUNTER — HOSPITAL ENCOUNTER (OUTPATIENT)
Dept: INTERVENTIONAL RADIOLOGY/VASCULAR | Age: 74
Discharge: HOME | End: 2024-11-22
Payer: MEDICARE

## 2024-11-22 VITALS
TEMPERATURE: 97.4 F | OXYGEN SATURATION: 99 % | SYSTOLIC BLOOD PRESSURE: 110 MMHG | WEIGHT: 113 LBS | DIASTOLIC BLOOD PRESSURE: 81 MMHG | BODY MASS INDEX: 20.8 KG/M2 | HEIGHT: 62 IN | HEART RATE: 72 BPM | RESPIRATION RATE: 18 BRPM

## 2024-11-22 DIAGNOSIS — S32.030A CLOSED COMPRESSION FRACTURE OF L3 LUMBAR VERTEBRA, INITIAL ENCOUNTER (HCC): ICD-10-CM

## 2024-11-22 DIAGNOSIS — S32.000A COMPRESSION FRACTURE OF LUMBAR VERTEBRA, CLOSED, INITIAL ENCOUNTER (HCC): ICD-10-CM

## 2024-11-22 LAB
ANION GAP SERPL CALCULATED.3IONS-SCNC: 11 MMOL/L (ref 3–16)
BASOPHILS # BLD: 0 K/UL (ref 0–0.2)
BASOPHILS NFR BLD: 0.5 %
BUN SERPL-MCNC: 15 MG/DL (ref 7–20)
CALCIUM SERPL-MCNC: 9.8 MG/DL (ref 8.3–10.6)
CHLORIDE SERPL-SCNC: 102 MMOL/L (ref 99–110)
CO2 SERPL-SCNC: 25 MMOL/L (ref 21–32)
CREAT SERPL-MCNC: 1 MG/DL (ref 0.6–1.2)
DEPRECATED RDW RBC AUTO: 13.2 % (ref 12.4–15.4)
EOSINOPHIL # BLD: 0.1 K/UL (ref 0–0.6)
EOSINOPHIL NFR BLD: 0.7 %
GFR SERPLBLD CREATININE-BSD FMLA CKD-EPI: 58 ML/MIN/{1.73_M2}
GLUCOSE SERPL-MCNC: 113 MG/DL (ref 70–99)
HCT VFR BLD AUTO: 44.6 % (ref 36–48)
HGB BLD-MCNC: 15.1 G/DL (ref 12–16)
INR PPP: 1 (ref 0.85–1.15)
LYMPHOCYTES # BLD: 0.9 K/UL (ref 1–5.1)
LYMPHOCYTES NFR BLD: 11.6 %
MCH RBC QN AUTO: 32.6 PG (ref 26–34)
MCHC RBC AUTO-ENTMCNC: 33.8 G/DL (ref 31–36)
MCV RBC AUTO: 96.4 FL (ref 80–100)
MONOCYTES # BLD: 0.5 K/UL (ref 0–1.3)
MONOCYTES NFR BLD: 7 %
NEUTROPHILS # BLD: 5.9 K/UL (ref 1.7–7.7)
NEUTROPHILS NFR BLD: 80.2 %
PLATELET # BLD AUTO: 256 K/UL (ref 135–450)
PMV BLD AUTO: 7.9 FL (ref 5–10.5)
POTASSIUM SERPL-SCNC: 4.5 MMOL/L (ref 3.5–5.1)
PROTHROMBIN TIME: 13.4 SEC (ref 11.9–14.9)
RBC # BLD AUTO: 4.63 M/UL (ref 4–5.2)
SODIUM SERPL-SCNC: 138 MMOL/L (ref 136–145)
WBC # BLD AUTO: 7.4 K/UL (ref 4–11)

## 2024-11-22 PROCEDURE — 99153 MOD SED SAME PHYS/QHP EA: CPT

## 2024-11-22 PROCEDURE — 85025 COMPLETE CBC W/AUTO DIFF WBC: CPT

## 2024-11-22 PROCEDURE — 99152 MOD SED SAME PHYS/QHP 5/>YRS: CPT

## 2024-11-22 PROCEDURE — 22514 PERQ VERTEBRAL AUGMENTATION: CPT

## 2024-11-22 PROCEDURE — 7100000011 HC PHASE II RECOVERY - ADDTL 15 MIN: Performed by: INTERNAL MEDICINE

## 2024-11-22 PROCEDURE — 7100000010 HC PHASE II RECOVERY - FIRST 15 MIN: Performed by: INTERNAL MEDICINE

## 2024-11-22 PROCEDURE — 85610 PROTHROMBIN TIME: CPT

## 2024-11-22 PROCEDURE — 36415 COLL VENOUS BLD VENIPUNCTURE: CPT

## 2024-11-22 PROCEDURE — 2580000003 HC RX 258: Performed by: INTERNAL MEDICINE

## 2024-11-22 PROCEDURE — 6360000002 HC RX W HCPCS: Performed by: INTERNAL MEDICINE

## 2024-11-22 PROCEDURE — C1713 ANCHOR/SCREW BN/BN,TIS/BN: HCPCS

## 2024-11-22 PROCEDURE — 80048 BASIC METABOLIC PNL TOTAL CA: CPT

## 2024-11-22 PROCEDURE — 6360000004 HC RX CONTRAST MEDICATION: Performed by: INTERNAL MEDICINE

## 2024-11-22 RX ORDER — IOPAMIDOL 408 MG/ML
30 INJECTION, SOLUTION INTRATHECAL
Status: COMPLETED | OUTPATIENT
Start: 2024-11-22 | End: 2024-11-22

## 2024-11-22 RX ORDER — MIDAZOLAM HYDROCHLORIDE 5 MG/ML
INJECTION, SOLUTION INTRAMUSCULAR; INTRAVENOUS PRN
Status: COMPLETED | OUTPATIENT
Start: 2024-11-22 | End: 2024-11-22

## 2024-11-22 RX ORDER — LIDOCAINE HYDROCHLORIDE 10 MG/ML
10 INJECTION, SOLUTION EPIDURAL; INFILTRATION; INTRACAUDAL; PERINEURAL ONCE
Status: COMPLETED | OUTPATIENT
Start: 2024-11-22 | End: 2024-11-22

## 2024-11-22 RX ORDER — FENTANYL CITRATE 50 UG/ML
INJECTION, SOLUTION INTRAMUSCULAR; INTRAVENOUS PRN
Status: COMPLETED | OUTPATIENT
Start: 2024-11-22 | End: 2024-11-22

## 2024-11-22 RX ORDER — OXYCODONE AND ACETAMINOPHEN 5; 325 MG/1; MG/1
1 TABLET ORAL EVERY 4 HOURS PRN
Status: DISCONTINUED | OUTPATIENT
Start: 2024-11-22 | End: 2024-11-23 | Stop reason: HOSPADM

## 2024-11-22 RX ORDER — BUPIVACAINE HYDROCHLORIDE 5 MG/ML
10 INJECTION, SOLUTION EPIDURAL; INTRACAUDAL
Status: COMPLETED | OUTPATIENT
Start: 2024-11-22 | End: 2024-11-22

## 2024-11-22 RX ORDER — KETOROLAC TROMETHAMINE 30 MG/ML
INJECTION, SOLUTION INTRAMUSCULAR; INTRAVENOUS PRN
Status: COMPLETED | OUTPATIENT
Start: 2024-11-22 | End: 2024-11-22

## 2024-11-22 RX ORDER — ACETAMINOPHEN 325 MG/1
650 TABLET ORAL EVERY 4 HOURS PRN
Status: DISCONTINUED | OUTPATIENT
Start: 2024-11-22 | End: 2024-11-23 | Stop reason: HOSPADM

## 2024-11-22 RX ORDER — IOPAMIDOL 755 MG/ML
50 INJECTION, SOLUTION INTRAVASCULAR
Status: DISCONTINUED | OUTPATIENT
Start: 2024-11-22 | End: 2024-11-23 | Stop reason: HOSPADM

## 2024-11-22 RX ADMIN — MIDAZOLAM HYDROCHLORIDE 0.5 MG: 5 INJECTION, SOLUTION INTRAMUSCULAR; INTRAVENOUS at 08:59

## 2024-11-22 RX ADMIN — MIDAZOLAM HYDROCHLORIDE 0.5 MG: 5 INJECTION, SOLUTION INTRAMUSCULAR; INTRAVENOUS at 09:06

## 2024-11-22 RX ADMIN — CEFAZOLIN 2000 MG: 1 INJECTION, POWDER, FOR SOLUTION INTRAVENOUS at 08:49

## 2024-11-22 RX ADMIN — MIDAZOLAM HYDROCHLORIDE 0.5 MG: 5 INJECTION, SOLUTION INTRAMUSCULAR; INTRAVENOUS at 09:17

## 2024-11-22 RX ADMIN — FENTANYL CITRATE 50 MCG: 50 INJECTION, SOLUTION INTRAMUSCULAR; INTRAVENOUS at 08:52

## 2024-11-22 RX ADMIN — MIDAZOLAM HYDROCHLORIDE 0.5 MG: 5 INJECTION, SOLUTION INTRAMUSCULAR; INTRAVENOUS at 09:19

## 2024-11-22 RX ADMIN — IOPAMIDOL 30 ML: 408 INJECTION, SOLUTION INTRATHECAL at 09:37

## 2024-11-22 RX ADMIN — FENTANYL CITRATE 25 MCG: 50 INJECTION, SOLUTION INTRAMUSCULAR; INTRAVENOUS at 09:17

## 2024-11-22 RX ADMIN — KETOROLAC TROMETHAMINE 30 MG: 30 INJECTION, SOLUTION INTRAMUSCULAR; INTRAVENOUS at 09:19

## 2024-11-22 RX ADMIN — MIDAZOLAM HYDROCHLORIDE 1 MG: 5 INJECTION, SOLUTION INTRAMUSCULAR; INTRAVENOUS at 08:49

## 2024-11-22 RX ADMIN — FENTANYL CITRATE 25 MCG: 50 INJECTION, SOLUTION INTRAMUSCULAR; INTRAVENOUS at 09:06

## 2024-11-22 RX ADMIN — FENTANYL CITRATE 25 MCG: 50 INJECTION, SOLUTION INTRAMUSCULAR; INTRAVENOUS at 08:59

## 2024-11-22 RX ADMIN — FENTANYL CITRATE 25 MCG: 50 INJECTION, SOLUTION INTRAMUSCULAR; INTRAVENOUS at 09:19

## 2024-11-22 RX ADMIN — BUPIVACAINE HYDROCHLORIDE 10 MG: 5 INJECTION, SOLUTION EPIDURAL; INTRACAUDAL at 09:35

## 2024-11-22 RX ADMIN — LIDOCAINE HYDROCHLORIDE 10 ML: 10 INJECTION, SOLUTION EPIDURAL; INFILTRATION; INTRACAUDAL; PERINEURAL at 09:36

## 2024-11-22 ASSESSMENT — PAIN - FUNCTIONAL ASSESSMENT
PAIN_FUNCTIONAL_ASSESSMENT: 0-10

## 2024-11-22 NOTE — PROGRESS NOTES
Pt awake and alert resting comfortably in bed. Tolerating PO. Bilat back dressings remain CDI. Bilateral pedal pulses 2+. D/C scheduled for 1240. Family at bedside. Call light within reach.

## 2024-11-22 NOTE — PROGRESS NOTES
Drsngs remain CDI. Bilat pedal pulses palpable. Discharge instructions reviewed and understanding verbalized per pt/family with copy given. PIV removed. All belongings returned. Pt transported for d/c via wheelchair with PCA.

## 2024-11-22 NOTE — PRE SEDATION
Inhale 2 puffs into the lungs 4 times daily as needed for Wheezing 11/13/24  Yes Lobo Palmer MD   tiotropium (SPIRIVA HANDIHALER) 18 MCG inhalation capsule Inhale 1 capsule into the lungs daily 11/13/24  Yes Lobo Palmer MD   verapamil (CALAN SR) 120 MG extended release tablet Take 1 tablet by mouth daily 11/13/24  Yes Loob Palmer MD   HYDROcodone-acetaminophen (NORCO) 5-325 MG per tablet Take 1 tablet by mouth every 4 hours as needed for Pain for up to 5 days. Intended supply: 5 days. Take lowest dose possible to manage pain Max Daily Amount: 6 tablets 11/18/24 11/23/24  Lobo Palmer MD   tiZANidine (ZANAFLEX) 4 MG tablet Take 1 tablet by mouth 3 times daily as needed (muscle spasm) 11/18/24   Lobo Palmer MD   ibuprofen (ADVIL;MOTRIN) 600 MG tablet Take 1 tablet by mouth 3 times daily as needed for Pain 11/18/24   Lobo Palmer MD   primidone (MYSOLINE) 50 MG tablet Take 1 tablet in the morning, 1 tablet at noon and 2 tablets at night 10/9/24   Lobo Palmer MD   ondansetron (ZOFRAN-ODT) 4 MG disintegrating tablet Take 1 tablet by mouth 3 times daily as needed for Nausea or Vomiting 9/25/24   Lobo Palmer MD   SUMAtriptan (IMITREX) 100 MG tablet TAKE 1 TABLET BY MOUTH FOR 1 DOSE AS NEEDED FOR MIGRAINE 6/18/24   Lobo Palmer MD   aspirin 81 MG EC tablet Take 1 tablet by mouth daily    ProviderCasey MD   Cholecalciferol (VITAMIN D PO) Take by mouth    Casey Guillen MD   Ascorbic Acid (VITAMIN C PO) Take by mouth    ProviderCasey MD     Coumadin Use Last 7 Days:  no  Antiplatelet drug therapy use last 7 days: no  Other anticoagulant use last 7 days: no  Additional Medication Information:  n/a      Pre-Sedation Documentation and Exam:   Vital signs have been reviewed (see flow sheet for vitals).  I have reviewed the patient's history and review of systems.    Mallampati Airway Assessment:  Mallampati Class III - (soft palate & base

## 2024-11-22 NOTE — DISCHARGE INSTRUCTIONS
Follow up with Dr. Palmer with any questions, concerns, results, and an appointment after your procedure in the time period recommended.         Kyphoplasty Instructions       Fatigue is common and expected.  Let pain be your guide.  Walking or standing is encouraged every hour that you are awake; gradually increase walking to 1-2 miles per day.    It is OK to go up and down stairs and to walk outside.    Do NOT smoke.  Smoking delays healing and increases the risk of infection.    Apply ice intermittently to incision area for the first two days.  Then you may use heat instead if desired.      Incision Care  Do not submerge your incision under water.  No tub baths, hot tubs or swimming until after your doctor’s approval.  (Usually two weeks)    Incisions normally have a small amount of clear or pink drainage. If necessary, cover with a gauze and tape dressing.  If you have a temperature of 101 degrees (unrelieved by Tylenol), or a large amount of drainage or foul smelling drainage, call your doctor right away.    You may remove the bandaid and shower after 24 hours.  Pat incision dry after shower.       SEDATION DISCHARGE INSTRUCTIONS   11/22/2024    Wear your seatbelt home.  You are under the influence of sedative medications. Do not drink alcohol, drive, operate machinery, or make any important decisions or sign any legal documents for 24 hours.  A responsible adult needs to be with you for 24 hours.  You may experience lightheadedness, dizziness, nausea, heightened emotions and/or sleepiness following surgery.  Rest at home today- increase activity as tolerated.  Progress slowly to a regular diet and drink plenty of fluids unless your physician has instructed you otherwise.  If nausea becomes a problem, call your physician.  Coughing, sore throat, and muscle aches are other side effects of anesthesia and should improve with time.  Do not drive or operate machinery while taking narcotics.      You may receive a

## 2024-11-22 NOTE — PROGRESS NOTES
Patient arrived from IR sedated after kyphoplasty with Dr. Jones. VSS. Patient unarrousable to voice, but reactive to stimuli. Continuous hemodynamic monitoring in place. Bilateral lower back dressings CDI. Family at bedside. Call light within reach.

## 2024-11-22 NOTE — BRIEF OP NOTE
Brief Postoperative Note    Jenna Merida  YOB: 1950  [id number]    Pre-operative Diagnosis: Acute compression fracture of L3    Post-operative Diagnosis: Same    Procedure: L3 kyphoplasty    Anesthesia: Local and Moderate Sedation    Surgeons/Assistants: Arturo Jones M.D.    Estimated Blood Loss: less than 50     Complications: None    Specimens: Was Not Obtained    Findings: Successful fluoroscopic guided kyphoplasty of L3    Electronically signed by Arturo Jones MD on 11/22/2024 at 9:37 AM

## 2024-11-25 ENCOUNTER — CARE COORDINATION (OUTPATIENT)
Dept: CASE MANAGEMENT | Age: 74
End: 2024-11-25

## 2024-11-25 ENCOUNTER — CARE COORDINATION (OUTPATIENT)
Dept: CARE COORDINATION | Age: 74
End: 2024-11-25

## 2024-11-25 DIAGNOSIS — M54.42 ACUTE BILATERAL LOW BACK PAIN WITH BILATERAL SCIATICA: ICD-10-CM

## 2024-11-25 DIAGNOSIS — M54.41 ACUTE BILATERAL LOW BACK PAIN WITH BILATERAL SCIATICA: ICD-10-CM

## 2024-11-25 RX ORDER — IBUPROFEN 600 MG/1
600 TABLET, FILM COATED ORAL 3 TIMES DAILY PRN
Qty: 30 TABLET | Refills: 0 | Status: SHIPPED | OUTPATIENT
Start: 2024-11-25

## 2024-11-25 RX ORDER — HYDROCODONE BITARTRATE AND ACETAMINOPHEN 5; 325 MG/1; MG/1
1 TABLET ORAL EVERY 4 HOURS PRN
Qty: 30 TABLET | Refills: 0 | Status: SHIPPED | OUTPATIENT
Start: 2024-11-25 | End: 2024-11-30

## 2024-11-25 NOTE — CARE COORDINATION
Date/Time:  11/25/2024 12:07 PM  LPN attempted to reach patient by telephone regarding red alert in remote patient monitoring program. Left HIPAA compliant message requesting a return call. Will attempt to reach patient again.    RED alert for /101  Enrolled in RPM for COPD AND COVID 19  Second attempt. Unable to reach.   Update to [facility]

## 2024-11-25 NOTE — TELEPHONE ENCOUNTER
Medication and Quantity requested: Hydrocodone  And Tizanidine 4mg  And  Ibuprofen   Patient had a procedure done on Friday  Patient is asking for refills on the 3 RX above  Last Visit  11-18-24,    Pharmacy and phone number updated in [facility]:  yes,[facility]

## 2024-11-25 NOTE — CARE COORDINATION
Date/Time:  11/25/2024 7:43 AM  LPN attempted to reach patient by telephone regarding red alert in remote patient monitoring program. Left HIPAA compliant message requesting a return call. Will attempt to reach patient again.    RED alert for /101  Enrolled in RPM for COPD AND COVID 19

## 2024-11-25 NOTE — TELEPHONE ENCOUNTER
Lov 11/18/24  Lrf 30 0 11/18/2  30 0 11/18/24   30 0 11/18/24 Medication:   Requested Prescriptions     Pending Prescriptions Disp Refills    ibuprofen (ADVIL;MOTRIN) 600 MG tablet 30 tablet 0     Sig: Take 1 tablet by mouth 3 times daily as needed for Pain    tiZANidine (ZANAFLEX) 4 MG tablet 30 tablet 0     Sig: Take 1 tablet by mouth 3 times daily as needed (muscle spasm)    HYDROcodone-acetaminophen (NORCO) 5-325 MG per tablet 30 tablet 0     Sig: Take 1 tablet by mouth every 4 hours as needed for Pain for up to 5 days. Intended supply: 5 days. Take lowest dose possible to manage pain Max Daily Amount: 6 tablets        Last Filled:      Patient Phone Number: [phone] (home)     Last appt: 11/18/2024   Next appt: 1/9/2025    Last OARRS:        No data to display

## 2024-11-25 NOTE — CARE COORDINATION
Ambulatory Care Coordination Note     2024 3:35 PM     Patient Current Location:  Home: [address] [place] [id number]     ACM contacted the patient by telephone. Verified name and  with patient as identifiers.         ACM: Gregoria Joe RN     Challenges to be reviewed by the provider   Additional needs identified to be addressed with provider No  none               Method of communication with provider: none.    Utilization: Patient has not had any utilization since our last call.     Care Summary Note:     ACM made care coordination outreach and spoke with patient regarding RPM alert for BP. Patient denied any chest pain, shortness of breath, swelling, lightheaded or dizziness. Stated that she is having pain related to her back. Called PCP office earlier today requesting refills on her pain medication. Reviewed taking Verpamil 120mg daily and patient stated that she is taking it as prescribed. Denied any other questions or concerns at this time. Reviewed signs and symptoms to monitor and importance of reporting concerns to appropriate site of care for early intervention. Patient is aware of provider on call services for non emergent after hours questions and to report to ED with RED flags concerns.    ACM contacted patient by telephone regarding red alert received   Background: COPD  Refer to 911 immediately if:  Patient unresponsive or unable to provide history  Change in cognition or sudden confusion  Patient unable to respond in complete sentences  Intense chest pain/tightness  Any concern for any clinical emergency  Red Alert: Provider response time of 1 hr required for any red alert requiring intervention  Yellow Alert: Provider response time of 3hr required for any escalated yellow alert  Patient Chief Complaint:  Blood pressure  Clinical Interventions: Reviewed and followed up on alerts and treatments-     Plan/Follow Up: Will continue to review, monitor and address alerts with follow up

## 2024-12-03 ENCOUNTER — CARE COORDINATION (OUTPATIENT)
Dept: CASE MANAGEMENT | Age: 74
End: 2024-12-03

## 2024-12-04 ENCOUNTER — CARE COORDINATION (OUTPATIENT)
Dept: CARE COORDINATION | Age: 74
End: 2024-12-04

## 2024-12-04 NOTE — CARE COORDINATION
Ambulatory Care Coordination Note     2024 11:25 AM     Patient Current Location:  Home: [address] [place] [id number]     ACM contacted the patient by telephone. Verified name and  with patient as identifiers.         ACM: Gregoria Joe RN     Challenges to be reviewed by the provider   Additional needs identified to be addressed with provider Yes    Patient's blood pressures have been elevated. Past 5 days are listed below. She is taking verapamil 120mg daily for her blood pressure. Please advise.       2024 144/83/75  (9:30 AM) 96/72  (9:31 AM)   12/3/2024 140/88/87  (10:32 AM)  153/99/94  (9:01 AM)  160/106/89  (8:22 AM) 97/93  (8:23 AM)   2024 150/97/95  (7:52 AM) 98/103  (7:53 AM)   2024 147/100/109  (7:36 AM) 96/107  (7:36 AM)   2024 150/97/101  (9:31 AM) 95/99  (9:32 AM)              Method of communication with provider: chart routing.    Utilization: Patient has not had any utilization since our last call.     Care Summary Note:     ACM made care coordination outreach and spoke with patient. Patient very pleasant on the phone while conversing with ACM. Patient reported that she is feeling \"okay.\" Discussed RPM and blood pressure. Metrics listed below. Taking Verapamil 120mg daily. Will route chart to PCP. Patient reported back pain 9/10. Taking Norco, ibuprofen and Zanaflex for pain. ACP reviewed. Patient does not have a healthcare power of  or living will. Agreeable to receive in mail and have  follow up. Denied any chest pain, shortness of breath or swelling. Denied any other questions or concerns. Acm to follow up at a later date.     2024 144/83/75  (9:30 AM) 96/72  (9:31 AM)   12/3/2024 140/88/87  (10:32 AM)  153/99/94  (9:01 AM)  160/106/89  (8:22 AM) 97/93  (8:23 AM)   2024 150/97/95  (7:52 AM) 98/103  (7:53 AM)   2024 147/100/109  (7:36 AM) 96/107  (7:36 AM)   2024 150/97/101  (9:31 AM) 95/99  (9:32 AM)   2024

## 2024-12-04 NOTE — CARE COORDINATION
Called patient and relayed PCP message \"Suggest she increase her verapamil to 1.5 tablets or 180 mg daily and monitor blood pressure over the next week.\" Patient verbalized understanding.

## 2024-12-05 ENCOUNTER — CARE COORDINATION (OUTPATIENT)
Dept: CARE COORDINATION | Age: 74
End: 2024-12-05

## 2024-12-05 NOTE — CARE COORDINATION
YASMINE received ACP referral from [facility]. YASMINE to send mail request to [facility] on this date. Will follow up with patient in two weeks to ensure receipt and assist in completion, if needed.     Neli Davis MSW, LSW     [phone]     Improved.

## 2024-12-06 DIAGNOSIS — M54.42 ACUTE BILATERAL LOW BACK PAIN WITH BILATERAL SCIATICA: ICD-10-CM

## 2024-12-06 DIAGNOSIS — M54.41 ACUTE BILATERAL LOW BACK PAIN WITH BILATERAL SCIATICA: ICD-10-CM

## 2024-12-06 RX ORDER — HYDROCODONE BITARTRATE AND ACETAMINOPHEN 5; 325 MG/1; MG/1
1 TABLET ORAL EVERY 4 HOURS PRN
Qty: 30 TABLET | Refills: 0 | Status: SHIPPED | OUTPATIENT
Start: 2024-12-06 | End: 2024-12-11

## 2024-12-06 RX ORDER — IBUPROFEN 600 MG/1
600 TABLET, FILM COATED ORAL 3 TIMES DAILY PRN
Qty: 30 TABLET | Refills: 0 | Status: SHIPPED | OUTPATIENT
Start: 2024-12-06

## 2024-12-06 NOTE — TELEPHONE ENCOUNTER
Medication and Quantity requested:      ibuprofen (ADVIL;MOTRIN) 600 MG tablet [[id number]]     AND      HYDROcodone-acetaminophen (NORCO) 5-325 MG per tablet [[id number]]  ENDED     Last Visit    11/18/2024      Pharmacy and phone number updated in EPIC:  yes    [facility]     Patient has a follow up appointment scheduled for 12/12/2024    She said she is in a lot of pain still from her procedure and only has enough medication to last this weekend.     Please call and advise

## 2024-12-12 ENCOUNTER — OFFICE VISIT (OUTPATIENT)
Dept: FAMILY MEDICINE CLINIC | Age: 74
End: 2024-12-12

## 2024-12-12 VITALS
SYSTOLIC BLOOD PRESSURE: 140 MMHG | DIASTOLIC BLOOD PRESSURE: 84 MMHG | HEART RATE: 66 BPM | BODY MASS INDEX: 21.03 KG/M2 | OXYGEN SATURATION: 99 % | WEIGHT: 115 LBS

## 2024-12-12 DIAGNOSIS — M54.42 ACUTE BILATERAL LOW BACK PAIN WITH BILATERAL SCIATICA: ICD-10-CM

## 2024-12-12 DIAGNOSIS — M54.41 ACUTE BILATERAL LOW BACK PAIN WITH BILATERAL SCIATICA: ICD-10-CM

## 2024-12-12 RX ORDER — HYDROCODONE BITARTRATE AND ACETAMINOPHEN 5; 325 MG/1; MG/1
1 TABLET ORAL EVERY 4 HOURS PRN
Qty: 120 TABLET | Refills: 0 | Status: SHIPPED | OUTPATIENT
Start: 2024-12-12 | End: 2025-01-11

## 2024-12-12 RX ORDER — PREDNISONE 10 MG/1
TABLET ORAL
Qty: 30 TABLET | Refills: 0 | Status: SHIPPED | OUTPATIENT
Start: 2024-12-12

## 2024-12-12 RX ORDER — LIDOCAINE 50 MG/G
1 PATCH TOPICAL DAILY
Qty: 90 PATCH | Refills: 0 | Status: SHIPPED | OUTPATIENT
Start: 2024-12-12 | End: 2025-03-12

## 2024-12-12 NOTE — PROGRESS NOTES
[Hydrocodone-Acetaminophen] Shortness Of Breath     Patient states that she was given too much pain medication during her ED visit which caused her to have shortness of breath and dizziness    Molds & Smuts Other (See Comments)     congestion    Montelukast Sodium Other (See Comments)     Vision issues    Pollen Extract Other (See Comments)     congestion       OBJECTIVE:  BP (!) 140/84   Pulse 66   Wt 52.2 kg (115 lb)   SpO2 99%   BMI 21.03 kg/m²   GEN:  in NAD thin, frail  CV:  Regular rate and rhythm, S1 and S2 normal, no murmurs, clicks  PULM:  Chest is clear, no wheezing , slightly symmetric air entry throughout both lung fields.  Back: Tender over the L5 spinal area be too small incisions at the L3 area have all healed nicely without any redness or pus  Generalized pain whenever she flexes extends twists tilts or rotates more than 25 degrees  ASSESSMENT/PLAN:  1. Acute bilateral low back pain with bilateral sciatica  Rule out new compression fracture or degenerative disc disease  - HYDROcodone-acetaminophen (NORCO) 5-325 MG per tablet; Take 1 tablet by mouth every 4 hours as needed for Pain for up to 30 days. Intended supply: 5 days. Take lowest dose possible to manage pain Max Daily Amount: 6 tablets  Dispense: 120 tablet; Refill: 0  - XR LUMBAR SPINE (2-3 VIEWS); Future  Will do heat or ice, Vicodin, tizanidine, prednisone 12-day taper  Hold Motrin while on prednisone taper    2 COPD  Stable, continue Spiriva and as needed albuterol    3 hypertension  Slightly up today due to pain, no change in medicine continue verapamil    4 immunizations/health maintenance  Urged flu and COVID at the pharmacy followed by RSV

## 2024-12-13 ENCOUNTER — CARE COORDINATION (OUTPATIENT)
Dept: CARE COORDINATION | Age: 74
End: 2024-12-13

## 2024-12-13 NOTE — TELEPHONE ENCOUNTER
Original Rx was sent to the wrong pharmacy. Patient canceled the prescription at [facility] and asking that the script be sent to the [facility].     ----------------------------------------------------------------------------------  Medication and Quantity requested:      lidocaine (LIDODERM) 5 % [[id number]]     Last Visit  12-    Pharmacy and phone number updated in EPIC:    [facility] [address]

## 2024-12-13 NOTE — CARE COORDINATION
Ambulatory Care Coordination Note     12/13/2024 1:47 PM     Patient outreach attempt by this ACM today to perform care management follow up . ACM was unable to reach the patient by telephone today;   left voice message requesting a return phone call to this ACM.     ACM: Gregoria Joe RN     Care Summary Note:     PCP/Specialist follow up:   Future Appointments         Provider Specialty Dept Phone    1/8/2025 9:20 AM Rojelio Bell MD Pulmonology [phone]    1/9/2025 10:00 AM Lobo Palmer MD Family Medicine [phone]

## 2024-12-16 RX ORDER — LIDOCAINE 50 MG/G
1 PATCH TOPICAL DAILY
Qty: 90 PATCH | Refills: 0 | Status: SHIPPED | OUTPATIENT
Start: 2024-12-16 | End: 2025-03-16

## 2024-12-17 ENCOUNTER — HOSPITAL ENCOUNTER (OUTPATIENT)
Age: 74
Discharge: HOME | End: 2024-12-17
Payer: MEDICARE

## 2024-12-17 ENCOUNTER — HOSPITAL ENCOUNTER (OUTPATIENT)
Dept: GENERAL RADIOLOGY | Age: 74
Discharge: HOME | End: 2024-12-17
Payer: MEDICARE

## 2024-12-17 DIAGNOSIS — M54.41 ACUTE BILATERAL LOW BACK PAIN WITH BILATERAL SCIATICA: ICD-10-CM

## 2024-12-17 DIAGNOSIS — M54.42 ACUTE BILATERAL LOW BACK PAIN WITH BILATERAL SCIATICA: ICD-10-CM

## 2024-12-17 PROCEDURE — 72100 X-RAY EXAM L-S SPINE 2/3 VWS: CPT

## 2024-12-18 RX ORDER — VERAPAMIL HYDROCHLORIDE 120 MG/1
120 TABLET, FILM COATED, EXTENDED RELEASE ORAL DAILY
Qty: 30 TABLET | Refills: 5 | Status: SHIPPED | OUTPATIENT
Start: 2024-12-18

## 2024-12-18 NOTE — TELEPHONE ENCOUNTER
Medication:   Requested Prescriptions     Pending Prescriptions Disp Refills    verapamil (CALAN SR) 120 MG extended release tablet 30 tablet 5     Sig: Take 1 tablet by mouth daily        Last Filled:  11/13/24    Patient Phone Number: [phone] (home)     Last appt: 12/12/2024   Next appt: 1/9/2025    Last OARRS:        No data to display

## 2024-12-18 NOTE — TELEPHONE ENCOUNTER
Medication and Quantity requesteD VERAPAMIL    Last Visit 12/12/2024        Pharmacy and phone number updated in EPIC  YES    [facility] send to [facility] [address] [place] [address]

## 2024-12-19 ENCOUNTER — CARE COORDINATION (OUTPATIENT)
Dept: CASE MANAGEMENT | Age: 74
End: 2024-12-19

## 2024-12-19 ENCOUNTER — TELEPHONE (OUTPATIENT)
Dept: FAMILY MEDICINE CLINIC | Age: 74
End: 2024-12-19

## 2024-12-19 NOTE — CARE COORDINATION
2024 10:49 AM  *  Alert and Triage   -Remote Alert Monitoring Note      Date/Time:  2024 10:49 AM  Patient Current Location: [place]  Verified patients name and  as identifiers.             Rpm alert to be reviewed by the provider                              LPN contacted patient by telephone regarding red alert received   Background: Pt enrolled for COPD and covid-19  Refer to 911 immediately if:  Patient unresponsive or unable to provide history  Change in cognition or sudden confusion  Patient unable to respond in complete sentences  Intense chest pain/tightness  Any concern for any clinical emergency  Red Alert: Provider response time of 1 hr required for any red alert requiring intervention  Yellow Alert: Provider response time of 3hr required for any escalated yellow alert  Patient Chief Complaint:  Blood Pressure BP Triage  Are you having any Chest Pain? no   Are you having any Shortness of Breath? Yes- chronic/hx of copd   Do you have a headache or have any vision changes? no   Are you having any numbness or tingling? no   Are you having any other health concerns or issues? no      Clinical Interventions:  LPN contacted pt in regard to RPM red alerts for BP of, 182/107. Pt denied any new, worrisome and or worsening symptoms at this time.      Writer confirmed with patient that he has no chest pain, SOB, headache, vision changes, numbness, tingling, N/V, dizziness/lightheadedness or any other concerns at this time.      Pt will recheck BP later this morning.     Plan/Follow Up: Will continue to review, monitor and address alerts with follow up based on severity of symptoms and risk factors.  **For any new or worsening symptoms or you are concerned in anyway, please contact your Provider or report to the nearest Emergency Room.**        María Orta LPN, [facility], Remote Patient Monitoring     PH: [phone]  Email: [email or web address]

## 2024-12-19 NOTE — TELEPHONE ENCOUNTER
Pt had an XR Lumbar Spine at [facility].  Pt would like a call to discuss the results.

## 2024-12-19 NOTE — TELEPHONE ENCOUNTER
Pt states she is in intense pain, can't sit or do anything, wondering if Dr. Palmer knows anything else the patient could do or see any type of specialist that could possibly help her

## 2024-12-20 ENCOUNTER — CARE COORDINATION (OUTPATIENT)
Dept: CASE MANAGEMENT | Age: 74
End: 2024-12-20

## 2024-12-20 DIAGNOSIS — S32.030G CLOSED COMPRESSION FRACTURE OF L3 LUMBAR VERTEBRA WITH DELAYED HEALING, SUBSEQUENT ENCOUNTER: Primary | ICD-10-CM

## 2024-12-20 NOTE — CARE COORDINATION
2024 9:48 AM  *  Alert and Triage   -Remote Alert Monitoring Note      Date/Time:  2024 9:48 AM  Patient Current Location: [place]  Verified patients name and  as identifiers.             Rpm alert to be reviewed by the provider                              LPN contacted patient by telephone regarding red alert received   Background: Pt enrolled for COPD and covid-19  Refer to 911 immediately if:  Patient unresponsive or unable to provide history  Change in cognition or sudden confusion  Patient unable to respond in complete sentences  Intense chest pain/tightness  Any concern for any clinical emergency  Red Alert: Provider response time of 1 hr required for any red alert requiring intervention  Yellow Alert: Provider response time of 3hr required for any escalated yellow alert  Patient Chief Complaint:  Blood Pressure BP Triage  Are you having any Chest Pain? no   Are you having any Shortness of Breath? Yes- chronic/hx of copd   Do you have a headache or have any vision changes? no   Are you having any numbness or tingling? no   Are you having any other health concerns or issues? no      Clinical Interventions:  LPN contacted pt in regard to RPM red alerts for BP of, 167/101. Pt denied any new, worrisome and or worsening symptoms at this time.      Writer confirmed with patient that he has no chest pain, SOB, headache, vision changes, numbness, tingling, N/V, dizziness/lightheadedness or any other concerns at this time.      Pt will recheck BP later this morning.     Plan/Follow Up: Will continue to review, monitor and address alerts with follow up based on severity of symptoms and risk factors.  **For any new or worsening symptoms or you are concerned in anyway, please contact your Provider or report to the nearest Emergency Room.**        María Orta LPN, [facility], Remote Patient Monitoring     PH: [phone]  Email: [email or web address]

## 2024-12-26 ENCOUNTER — HOSPITAL ENCOUNTER (OUTPATIENT)
Dept: PULMONOLOGY | Age: 74
Discharge: HOME | End: 2024-12-26
Attending: STUDENT IN AN ORGANIZED HEALTH CARE EDUCATION/TRAINING PROGRAM
Payer: MEDICARE

## 2024-12-26 ENCOUNTER — CARE COORDINATION (OUTPATIENT)
Dept: CASE MANAGEMENT | Age: 74
End: 2024-12-26

## 2024-12-26 ENCOUNTER — CARE COORDINATION (OUTPATIENT)
Dept: CARE COORDINATION | Age: 74
End: 2024-12-26

## 2024-12-26 VITALS — OXYGEN SATURATION: 100 % | RESPIRATION RATE: 16 BRPM | HEART RATE: 71 BPM

## 2024-12-26 DIAGNOSIS — J44.9 CHRONIC OBSTRUCTIVE PULMONARY DISEASE, UNSPECIFIED COPD TYPE (HCC): ICD-10-CM

## 2024-12-26 LAB
DLCO %PRED: 69 %
DLCO PRED: NORMAL
DLCO/VA %PRED: NORMAL
DLCO/VA PRED: NORMAL
DLCO/VA: NORMAL
DLCO: NORMAL
EXPIRATORY TIME-POST: NORMAL
EXPIRATORY TIME: NORMAL
FEF 25-75 %CHNG: NORMAL
FEF 25-75 POST %PRED: NORMAL
FEF 25-75% %PRED-PRE: NORMAL
FEF 25-75% PRED: NORMAL
FEF 25-75-POST: NORMAL
FEF 25-75-PRE: NORMAL
FEV1 %PRED-POST: NORMAL
FEV1 %PRED-PRE: 112 %
FEV1 PRED: NORMAL
FEV1-POST: NORMAL
FEV1-PRE: NORMAL
FEV1/FVC %PRED-POST: NORMAL
FEV1/FVC %PRED-PRE: NORMAL
FEV1/FVC PRED: NORMAL
FEV1/FVC-POST: NORMAL
FEV1/FVC-PRE: 72 %
FVC %PRED-POST: NORMAL
FVC %PRED-PRE: NORMAL
FVC PRED: NORMAL
FVC-POST: NORMAL
FVC-PRE: NORMAL
GAW %PRED: NORMAL
GAW PRED: NORMAL
GAW: NORMAL
IC PRE %PRED: NORMAL
IC PRED: NORMAL
IC: NORMAL
MEP: NORMAL
MIP: NORMAL
MVV %PRED-PRE: NORMAL
MVV PRED: NORMAL
MVV-PRE: NORMAL
PEF %PRED-POST: NORMAL
PEF %PRED-PRE: NORMAL
PEF PRED: NORMAL
PEF%CHNG: NORMAL
PEF-POST: NORMAL
PEF-PRE: NORMAL
RAW %PRED: NORMAL
RAW PRED: NORMAL
RAW: NORMAL
RV PRE %PRED: NORMAL
RV PRED: NORMAL
RV: NORMAL
SVC %PRED: NORMAL
SVC PRED: NORMAL
SVC: NORMAL
TLC PRE %PRED: 101 %
TLC PRED: NORMAL
TLC: NORMAL
VA %PRED: NORMAL
VA PRED: NORMAL
VA: NORMAL
VTG %PRED: NORMAL
VTG PRED: NORMAL
VTG: NORMAL

## 2024-12-26 PROCEDURE — 94729 DIFFUSING CAPACITY: CPT

## 2024-12-26 PROCEDURE — 94726 PLETHYSMOGRAPHY LUNG VOLUMES: CPT

## 2024-12-26 PROCEDURE — 94010 BREATHING CAPACITY TEST: CPT

## 2024-12-26 PROCEDURE — 94760 N-INVAS EAR/PLS OXIMETRY 1: CPT

## 2024-12-26 RX ORDER — ALBUTEROL SULFATE 90 UG/1
4 INHALANT RESPIRATORY (INHALATION) ONCE
Status: CANCELLED | OUTPATIENT
Start: 2024-12-26

## 2024-12-26 ASSESSMENT — PULMONARY FUNCTION TESTS
FEV1_PERCENT_PREDICTED_PRE: 112
FEV1/FVC_PRE: 72

## 2024-12-26 NOTE — CARE COORDINATION
Ambulatory Care Coordination Note     12/26/2024 11:23 AM     Patient outreach attempt by this ACM today to perform care management follow up . ACM was unable to reach the patient by telephone today;   ACM attempted to reach patient - patient number states call is restricted by called party. Attempted patient's husbands number and VM is not set up.      ACM: Jennifer Dallas RN     Care Summary Note: ACM was UTR patient at this time.     PCP/Specialist follow up:   Future Appointments         Provider Specialty Dept Phone    1/6/2025 10:40 AM Rojelio Bell MD Pulmonology [phone]    1/9/2025 10:00 AM Lobo Palmer MD Family Medicine [phone]            Follow Up:   Plan for next ACM outreach in approximately 1 week to complete:  Scheduled follow up with primary ACM .

## 2024-12-26 NOTE — CARE COORDINATION
Advance Care Planning Note      Date: 12/26/2024    Patient Current Location:  [place]    ACP Specialist:  QUINCY Padgett    Date Referral Received:12/5/24    Initial Outreach:     Outreach call to patient in follow-up to ACP Specialist referral from: Ambulatory Care Manager Heath  requesting assistance with new advance directive completion  SW placed call to patient who confirmed receipt of mailed documents. She plans to complete them soon.     Next Step:    Closing referral due to no questions or assistance needed to complete documents.  Routing closure to referral source.        Thank you for this referral.    Neli Davis MSW, JOHNW     [phone]

## 2024-12-26 NOTE — CARE COORDINATION
12/26/2024 10:54 AM  *  Unable to Reach Date/Time:  12/26/2024 10:54 AM  LPN attempted to reach patient by telephone regarding red alert in remote patient monitoring program for BP of 169/103. Left HIPAA compliant message requesting a return call. Writer will route to [facility], per RPM protocol, if unable to reach patient and emergency contact after three attempts.            09:49- Pt currently at [facility] f/u at this time, Epic.     María Orta LPN, PCC, Remote Patient Monitoring     PH: [phone]  Email: [email or web address]

## 2024-12-26 NOTE — CARE COORDINATION
12/26/2024 9:49 AM  *  Unable to Reach Date/Time:  12/26/2024 9:49 AM  LPN attempted to reach patient by telephone regarding red alert in remote patient monitoring program for BP of 169/103. Left HIPAA compliant message requesting a return call. Will attempt to reach patient again.         Pt currently at [facility] f/u at this time, Epic.    María Orta LPN, PCC, Remote Patient Monitoring    PH: [phone]  Email: [email or web address]

## 2024-12-27 NOTE — PROCEDURES
Pulmonary Function Testing      Patient name:  Jenna Merida      Unit #:   [id number]   Date of test:  12/26/2024   Date of interpretation:   12/27/2024    Ms. Jenna Merida is a 74 y.o. year-old former smoker. The spirometry data were acceptable and reproducible.     Spirometry:  Flow volume loops were obstructed. The FEV-1/FVC ratio was normal. The pre-bronchodilator FEV-1 was 1.95 liters (112% of predicted), which was increased. The FVC was 2.71 liters (121% of predicted), which was increased. Response to inhaled bronchodilators (albuterol) was not performed.    Lung volumes:  Lung volumes were tested by plethysmography. The total lung capacity was 4.51 liters (101% of predicted), which was normal. The residual volume was 2.55 liters (120% of predicted), which was increased. The ratio of residual volume to total lung capacity (RV/TLC) was 56, which was increased.     Diffusion capacity was found to be 69% predicted which is Mildly decreased.      Interpretation:  Signs of hyperinflation with mild decrease in DLCO. Clinical correlation is recommended.    Comments:

## 2024-12-30 ENCOUNTER — CARE COORDINATION (OUTPATIENT)
Dept: CARE COORDINATION | Age: 74
End: 2024-12-30

## 2024-12-30 NOTE — CARE COORDINATION
Ambulatory Care Coordination Note     12/30/2024 2:21 PM     Patient outreach attempt by this ACM today to perform care management follow up . ACM was unable to reach the patient by telephone today;   left voice message requesting a return phone call to this ACM.     ACM: Gregoria Joe RN     Care Summary Note:     PCP/Specialist follow up:   Future Appointments         Provider Specialty Dept Phone    1/6/2025 10:40 AM Rojelio Bell MD Pulmonology [phone]    1/9/2025 10:00 AM Lobo Palmer MD Family Medicine [phone]

## 2025-01-08 ENCOUNTER — CARE COORDINATION (OUTPATIENT)
Dept: CASE MANAGEMENT | Age: 75
End: 2025-01-08

## 2025-01-08 PROBLEM — C64.1 MALIGNANT NEOPLASM OF RIGHT KIDNEY, EXCEPT RENAL PELVIS (HCC): Status: ACTIVE | Noted: 2025-01-08

## 2025-01-08 PROBLEM — F10.21 ALCOHOL DEPENDENCE, IN REMISSION (HCC): Status: ACTIVE | Noted: 2025-01-08

## 2025-01-13 ENCOUNTER — CARE COORDINATION (OUTPATIENT)
Dept: CARE COORDINATION | Age: 75
End: 2025-01-13

## 2025-01-13 NOTE — CARE COORDINATION
Explanation, Teachback  Response: Verbalizes Understanding, Demonstrated Understanding    Pneumovax Recommendation, taught by Gregoria Joe RN at 1/13/2025  4:14 PM.  Learner: Patient  Readiness: Acceptance  Method: Explanation, Teachback  Response: Verbalizes Understanding, Demonstrated Understanding    Lifestyle Changes/Goal Setting, taught by Gregoria Joe RN at 1/13/2025  4:14 PM.  Learner: Patient  Readiness: Acceptance  Method: Explanation, Teachback  Response: Verbalizes Understanding, Demonstrated Understanding    General medication information, taught by Gregoria Joe RN at 1/13/2025  4:14 PM.  Learner: Patient  Readiness: Acceptance  Method: Explanation, Teachback  Response: Verbalizes Understanding, Demonstrated Understanding    Home Health Care Services, taught by Gregoria Joe RN at 1/13/2025  4:14 PM.  Learner: Patient  Readiness: Acceptance  Method: Explanation, Teachback  Response: Verbalizes Understanding, Demonstrated Understanding    Educate reporting changes in condition, taught by Gregoria Joe RN at 1/13/2025  4:14 PM.  Learner: Patient  Readiness: Acceptance  Method: Explanation, Teachback  Response: Verbalizes Understanding, Demonstrated Understanding    Educate Patient on When to Call for Symptoms, taught by Gregoria Joe RN at 1/13/2025  4:14 PM.  Learner: Patient  Readiness: Acceptance  Method: Explanation, Teachback  Response: Verbalizes Understanding, Demonstrated Understanding    Educate limitations or barriers to activity and/or exercise on discharge, taught by Gregoria Joe RN at 1/13/2025  4:14 PM.  Learner: Patient  Readiness: Acceptance  Method: Explanation, Teachback  Response: Verbalizes Understanding, Demonstrated Understanding    COGNITIVE : DISCHARGE PLANNING, taught by Gregoria Joe RN at 1/13/2025  4:14 PM.  Learner: Patient  Readiness: Acceptance  Method: Explanation, Teachback  Response: Verbalizes Understanding, Demonstrated Understanding    Educate dietary adherence benefits,

## 2025-01-15 ENCOUNTER — CARE COORDINATION (OUTPATIENT)
Dept: CASE MANAGEMENT | Age: 75
End: 2025-01-15

## 2025-01-15 ENCOUNTER — OFFICE VISIT (OUTPATIENT)
Dept: FAMILY MEDICINE CLINIC | Age: 75
End: 2025-01-15

## 2025-01-15 VITALS
HEART RATE: 75 BPM | RESPIRATION RATE: 15 BRPM | HEIGHT: 62 IN | SYSTOLIC BLOOD PRESSURE: 126 MMHG | DIASTOLIC BLOOD PRESSURE: 78 MMHG | WEIGHT: 112.2 LBS | OXYGEN SATURATION: 98 % | BODY MASS INDEX: 20.65 KG/M2

## 2025-01-15 DIAGNOSIS — C64.1 MALIGNANT NEOPLASM OF RIGHT KIDNEY, EXCEPT RENAL PELVIS (HCC): ICD-10-CM

## 2025-01-15 DIAGNOSIS — M54.42 CHRONIC MIDLINE LOW BACK PAIN WITH BILATERAL SCIATICA: ICD-10-CM

## 2025-01-15 DIAGNOSIS — G89.29 CHRONIC MIDLINE LOW BACK PAIN WITH BILATERAL SCIATICA: ICD-10-CM

## 2025-01-15 DIAGNOSIS — K21.9 GASTROESOPHAGEAL REFLUX DISEASE WITHOUT ESOPHAGITIS: ICD-10-CM

## 2025-01-15 DIAGNOSIS — M81.8 OTHER OSTEOPOROSIS WITHOUT CURRENT PATHOLOGICAL FRACTURE: ICD-10-CM

## 2025-01-15 DIAGNOSIS — C50.912 BILATERAL MALIGNANT NEOPLASM OF BREAST IN FEMALE, UNSPECIFIED ESTROGEN RECEPTOR STATUS, UNSPECIFIED SITE OF BREAST (HCC): Primary | ICD-10-CM

## 2025-01-15 DIAGNOSIS — C50.911 BILATERAL MALIGNANT NEOPLASM OF BREAST IN FEMALE, UNSPECIFIED ESTROGEN RECEPTOR STATUS, UNSPECIFIED SITE OF BREAST (HCC): Primary | ICD-10-CM

## 2025-01-15 DIAGNOSIS — J44.9 CHRONIC OBSTRUCTIVE PULMONARY DISEASE, UNSPECIFIED COPD TYPE (HCC): ICD-10-CM

## 2025-01-15 DIAGNOSIS — M54.41 CHRONIC MIDLINE LOW BACK PAIN WITH BILATERAL SCIATICA: ICD-10-CM

## 2025-01-15 DIAGNOSIS — Z23 FLU VACCINE NEED: ICD-10-CM

## 2025-01-15 DIAGNOSIS — F10.21 ALCOHOL DEPENDENCE, IN REMISSION (HCC): ICD-10-CM

## 2025-01-15 DIAGNOSIS — E78.5 HYPERLIPIDEMIA, UNSPECIFIED HYPERLIPIDEMIA TYPE: ICD-10-CM

## 2025-01-15 RX ORDER — VERAPAMIL HYDROCHLORIDE 120 MG/1
120 TABLET, FILM COATED, EXTENDED RELEASE ORAL 2 TIMES DAILY
Qty: 60 TABLET | Refills: 5 | Status: SHIPPED | OUTPATIENT
Start: 2025-01-15

## 2025-01-15 ASSESSMENT — PATIENT HEALTH QUESTIONNAIRE - PHQ9
SUM OF ALL RESPONSES TO PHQ QUESTIONS 1-9: 0
SUM OF ALL RESPONSES TO PHQ9 QUESTIONS 1 & 2: 0
1. LITTLE INTEREST OR PLEASURE IN DOING THINGS: NOT AT ALL
2. FEELING DOWN, DEPRESSED OR HOPELESS: NOT AT ALL

## 2025-01-15 NOTE — PROGRESS NOTES
Jenna Merida is a 74 y.o. female.    HPI: Seen here for complex medical visit and follow-up from 12/12/2024 when she presented with ongoing low back pain.  Previously found to have an L3 compression fracture and had kyphoplasty November 22, 2024 but never really got any pain relief pain has progressed to be low back and down both buttocks into the back of the legs.  I gave her Norco and ordered x-rays which showed that the L3 compression fracture height had been restored to the previous height after kyphoplasty  Low back pain and bilat scialtica - done    with 12 day prednisone - using tizanadine and motrin and lidoderm patch  Bp high at home in am  - 168/101, other times it is normal  Breathing is good, sees pulm next week  Meds, vitamins and allergies reviewed with pt    ROS: No TIA's or unusual headaches, no dysphagia.  No prolonged cough. No dyspnea or chest pain on exertion.  No abdominal pain, change in bowel habits, black or bloody stools.  No urinary tract symptoms.  No new or unusual musculoskeletal symptoms.       Prior to Visit Medications    Medication Sig Taking? Authorizing Provider   verapamil (CALAN SR) 120 MG extended release tablet Take 1 tablet by mouth daily Yes Lobo Palmer MD   lidocaine (LIDODERM) 5 % Place 1 patch onto the skin daily 12 hours on, 12 hours off. Yes Lobo Palmer MD   predniSONE (DELTASONE) 10 MG tablet Take 4,4,4,3,3,3,2,2,2,1,1,1po qd as dir Yes Lobo Palmer MD   tiZANidine (ZANAFLEX) 4 MG tablet Take 1 tablet by mouth 3 times daily as needed (muscle spasm) Yes Lobo Palmer MD   ibuprofen (ADVIL;MOTRIN) 600 MG tablet Take 1 tablet by mouth 3 times daily as needed for Pain Yes Lobo Palmer MD   albuterol sulfate HFA (VENTOLIN HFA) 108 (90 Base) MCG/ACT inhaler Inhale 2 puffs into the lungs 4 times daily as needed for Wheezing Yes Lobo Palmer MD   tiotropium (SPIRIVA HANDIHALER) 18 MCG inhalation capsule Inhale 1 capsule into

## 2025-01-15 NOTE — CARE COORDINATION
1/15/2025 8:55 AM  *  RPM Alert   Remote Patient Monitoring Note      Date/Time:  1/15/2025 8:56 AM  Patient Current Location: [place]  LPN noted RPM regarding red alert received for blood pressure reading (168/110).   Clinical Interventions: Reviewed and followed up on alerts and treatments-LPN noted RPM regarding red alert for BP reading. Of note, pt has already arrived at f/u with PCP at this time. Writer did not contact and will note BP reading at PCP OV today.    Plan/Follow Up: Will continue to review, monitor and address alerts with follow up based on severity of symptoms and risk factors.             María Orta LPN, PCC, Remote Patient Monitoring    PH: [phone]  Email: [email or web address]

## 2025-01-20 ENCOUNTER — HOSPITAL ENCOUNTER (OUTPATIENT)
Dept: GENERAL RADIOLOGY | Age: 75
Discharge: HOME | End: 2025-01-20
Attending: FAMILY MEDICINE
Payer: MEDICARE

## 2025-01-20 DIAGNOSIS — Z78.0 MENOPAUSE: ICD-10-CM

## 2025-01-20 PROCEDURE — 77080 DXA BONE DENSITY AXIAL: CPT

## 2025-01-22 ENCOUNTER — CARE COORDINATION (OUTPATIENT)
Dept: CASE MANAGEMENT | Age: 75
End: 2025-01-22

## 2025-01-22 NOTE — CARE COORDINATION
2025 8:24 AM  *  Alert and Triage   -Remote Alert Monitoring Note      Date/Time:  2025 8:24 AM  Patient Current Location: [place]  Verified patients name and  as identifiers.             Rpm alert to be reviewed by the provider                              LPN contacted patient by telephone regarding red alert received   Background: Pt enrolled for COPD and covid-19  Refer to 911 immediately if:  Patient unresponsive or unable to provide history  Change in cognition or sudden confusion  Patient unable to respond in complete sentences  Intense chest pain/tightness  Any concern for any clinical emergency  Red Alert: Provider response time of 1 hr required for any red alert requiring intervention  Yellow Alert: Provider response time of 3hr required for any escalated yellow alert  Patient Chief Complaint:  BP Triage  Are you having any Chest Pain? no   Are you having any Shortness of Breath? no   Do you have a headache or have any vision changes? no   Are you having any numbness or tingling? no   Are you having any other health concerns or issues? no  Patient/Caregiver educated on how to properly take a blood pressure. Patient/Caregiver verbalizes understanding.         Clinical Interventions:  LPN contacted pt in regard to RPM red alerts for BP of, 177/103. Pt denied any new, worrisome and or worsening symptoms at this time.      Writer confirmed with patient that he has no chest pain, SOB, headache, vision changes, numbness, tingling, N/V, dizziness/lightheadedness or any other concerns at this time.      Pt will recheck BP later this morning.     09:52- Pt updated BP to 142/90, WNL for this pt.      Plan/Follow Up: Will continue to review, monitor and address alerts with follow up based on severity of symptoms and risk factors.  **For any new or worsening symptoms or you are concerned in anyway, please contact your Provider or report to the nearest Emergency Room.**        María Orta, DIONNE, PCC,

## 2025-01-24 ENCOUNTER — CARE COORDINATION (OUTPATIENT)
Dept: CARE COORDINATION | Age: 75
End: 2025-01-24

## 2025-01-24 NOTE — CARE COORDINATION
Ambulatory Care Coordination Note     1/24/2025 1:58 PM     Patient outreach attempt by this ACM today to perform care management follow up . ACM was unable to reach the patient by telephone today;   left voice message requesting a return phone call to this ACM.     ACM: Gregoria Joe RN     Care Summary Note:     PCP/Specialist follow up:   Future Appointments         Provider Specialty Dept Phone    1/29/2025 1:00 PM Rojelio Bell MD Pulmonology [phone]    2/3/2025 3:00 PM (Arrive by 2:15 PM) [facility] MRI  1 Radiology [phone]

## 2025-01-29 ENCOUNTER — TELEPHONE (OUTPATIENT)
Dept: FAMILY MEDICINE CLINIC | Age: 75
End: 2025-01-29

## 2025-01-29 ENCOUNTER — OFFICE VISIT (OUTPATIENT)
Dept: PULMONOLOGY | Age: 75
End: 2025-01-29
Payer: MEDICARE

## 2025-01-29 VITALS
HEIGHT: 62 IN | BODY MASS INDEX: 20.8 KG/M2 | DIASTOLIC BLOOD PRESSURE: 66 MMHG | SYSTOLIC BLOOD PRESSURE: 128 MMHG | HEART RATE: 71 BPM | WEIGHT: 113 LBS | OXYGEN SATURATION: 99 %

## 2025-01-29 DIAGNOSIS — J98.11 ATELECTASIS: Primary | ICD-10-CM

## 2025-01-29 DIAGNOSIS — M81.8 OTHER OSTEOPOROSIS WITHOUT CURRENT PATHOLOGICAL FRACTURE: Primary | ICD-10-CM

## 2025-01-29 DIAGNOSIS — Z77.090 ASBESTOS EXPOSURE: ICD-10-CM

## 2025-01-29 DIAGNOSIS — Z86.16 HISTORY OF COVID-19: ICD-10-CM

## 2025-01-29 DIAGNOSIS — J44.9 CHRONIC OBSTRUCTIVE PULMONARY DISEASE, UNSPECIFIED COPD TYPE (HCC): ICD-10-CM

## 2025-01-29 DIAGNOSIS — Z12.2 SCREENING FOR LUNG CANCER: ICD-10-CM

## 2025-01-29 DIAGNOSIS — F17.201 TOBACCO USE DISORDER, SEVERE, IN EARLY REMISSION: ICD-10-CM

## 2025-01-29 PROCEDURE — 1090F PRES/ABSN URINE INCON ASSESS: CPT | Performed by: STUDENT IN AN ORGANIZED HEALTH CARE EDUCATION/TRAINING PROGRAM

## 2025-01-29 PROCEDURE — G8399 PT W/DXA RESULTS DOCUMENT: HCPCS | Performed by: STUDENT IN AN ORGANIZED HEALTH CARE EDUCATION/TRAINING PROGRAM

## 2025-01-29 PROCEDURE — 99214 OFFICE O/P EST MOD 30 MIN: CPT | Performed by: STUDENT IN AN ORGANIZED HEALTH CARE EDUCATION/TRAINING PROGRAM

## 2025-01-29 PROCEDURE — 1036F TOBACCO NON-USER: CPT | Performed by: STUDENT IN AN ORGANIZED HEALTH CARE EDUCATION/TRAINING PROGRAM

## 2025-01-29 PROCEDURE — 1123F ACP DISCUSS/DSCN MKR DOCD: CPT | Performed by: STUDENT IN AN ORGANIZED HEALTH CARE EDUCATION/TRAINING PROGRAM

## 2025-01-29 PROCEDURE — G8420 CALC BMI NORM PARAMETERS: HCPCS | Performed by: STUDENT IN AN ORGANIZED HEALTH CARE EDUCATION/TRAINING PROGRAM

## 2025-01-29 PROCEDURE — G2211 COMPLEX E/M VISIT ADD ON: HCPCS | Performed by: STUDENT IN AN ORGANIZED HEALTH CARE EDUCATION/TRAINING PROGRAM

## 2025-01-29 PROCEDURE — G8428 CUR MEDS NOT DOCUMENT: HCPCS | Performed by: STUDENT IN AN ORGANIZED HEALTH CARE EDUCATION/TRAINING PROGRAM

## 2025-01-29 PROCEDURE — 3023F SPIROM DOC REV: CPT | Performed by: STUDENT IN AN ORGANIZED HEALTH CARE EDUCATION/TRAINING PROGRAM

## 2025-01-29 RX ORDER — TIOTROPIUM BROMIDE 18 UG/1
18 CAPSULE ORAL; RESPIRATORY (INHALATION) DAILY
Qty: 90 CAPSULE | Refills: 1 | Status: SHIPPED | OUTPATIENT
Start: 2025-01-29

## 2025-01-29 RX ORDER — ALBUTEROL SULFATE 90 UG/1
2 INHALANT RESPIRATORY (INHALATION) 4 TIMES DAILY PRN
Qty: 18 G | Refills: 5 | Status: SHIPPED | OUTPATIENT
Start: 2025-01-29

## 2025-01-29 NOTE — PROGRESS NOTES
Value Date/Time     11/22/2024 07:43 AM    K 4.5 11/22/2024 07:43 AM    K 3.8 09/24/2024 04:40 AM     11/22/2024 07:43 AM    CO2 25 11/22/2024 07:43 AM    BUN 15 11/22/2024 07:43 AM    CREATININE 1.0 11/22/2024 07:43 AM    GLUCOSE 113 11/22/2024 07:43 AM    CALCIUM 9.8 11/22/2024 07:43 AM     CBC:   Lab Results   Component Value Date/Time    WBC 7.4 11/22/2024 07:43 AM    RBC 4.63 11/22/2024 07:43 AM    HGB 15.1 11/22/2024 07:43 AM    HCT 44.6 11/22/2024 07:43 AM    MCV 96.4 11/22/2024 07:43 AM    MCH 32.6 11/22/2024 07:43 AM    MCHC 33.8 11/22/2024 07:43 AM    RDW 13.2 11/22/2024 07:43 AM     11/22/2024 07:43 AM    MPV 7.9 11/22/2024 07:43 AM    EOSABS 0.1 11/22/2024 07:43 AM    EOSABS 0.0 09/24/2024 04:40 AM    EOSABS 0.0 09/23/2024 12:13 PM       IMAGES:    LDCT 11/2/2024  IMPRESSION:  No suspicious pulmonary nodule.     LUNG RADS:  Lung-RADS 2 - Benign (v2022)     Management:  12 month screening LDCT    9/23/2024  IMPRESSION:  1. Complete opacification of the bronchus intermedius with peripheral airway  opacification in the right middle and lower lobes. This is new from the  patient's prior CT chest. Bronchoscopy may be helpful to further characterize.  2. Airspace changes at the right lung base described on the prior chest  radiograph correlate to the right middle lobe favoring a combination of  atelectasis and postobstructive pneumonitis.  3. Reactive right hilar lymph node enlargement.  4. Chronic calcified pleural plaques bilaterally.  5. Stable tiny noncalcified pulmonary nodules.  Chronic interstitial changes  and emphysema also noted to be stable.  6. Stable left hepatic cyst.    PFT:   12/26/2024  PFT   Pre-bronch FVC 2.71L, 121%  Pre-Bronch FEV1 1.95L, 112%  FEV1/FVC 72  TLC 4.51L, 101%  RV 2.55L, 120%,  DLCO 11.62, 69%      2011  Spirometry reveals normal FVC.  FEV1 is reduced at 1.01 L, which is 45% of  predicted.  FEV1/FVC ratio is reduced at 46%.  There is no

## 2025-01-29 NOTE — TELEPHONE ENCOUNTER
Pt needs the name of the doctor and phone number for the dr that zuleyma wants the patient to see for osteoporosis. Pt said we can send her the information on her [employer]

## 2025-01-31 ENCOUNTER — TELEPHONE (OUTPATIENT)
Dept: ENDOCRINOLOGY | Age: 75
End: 2025-01-31

## 2025-02-03 RX ORDER — VERAPAMIL HYDROCHLORIDE 120 MG/1
120 TABLET, FILM COATED, EXTENDED RELEASE ORAL 2 TIMES DAILY
Qty: 180 TABLET | Refills: 3 | Status: SHIPPED | OUTPATIENT
Start: 2025-02-03

## 2025-02-03 NOTE — TELEPHONE ENCOUNTER
Patient states that Verapamil RX should have been sent to [facility]  The RX was sent to [facility] in error  Patient is asking for this to be done today  Patient is almost out of meds

## 2025-02-07 ENCOUNTER — CARE COORDINATION (OUTPATIENT)
Dept: CARE COORDINATION | Age: 75
End: 2025-02-07

## 2025-02-07 NOTE — CARE COORDINATION
129/74/83  (9:02 AM) 94/84  (9:02 AM)   12/23/2024 135/78/66  (9:16 AM)  164/94/80  (8:19 AM) 96/75  (8:20 AM)   12/22/2024 138/79/73  (8:11 AM) 98/68  (8:11 AM)   12/21/2024 151/86/73  (9:00 AM)  165/90/84  (8:05 AM) 96/84  (8:05 AM)   12/20/2024 153/83/81  (12:04 PM)  167/101/84  (9:21 AM) 96/77  (9:22 AM)   12/19/2024 135/79/71  (11:00 AM)  182/107/85  (10:03 AM) 97/85  (10:03 AM)   12/18/2024 144/87/74  (9:47 AM) 96/76  (9:47 AM)   12/17/2024 160/94/67  (10:12 AM) 97/60  (10:13 AM)   12/16/2024 153/85/67  (9:49 AM) 97/65  (9:49 AM)   12/15/2024 161/96/77  (9:57 AM) 97/75  (9:57 AM)   12/14/2024 152/88/72  (12:00 PM)  160/93/78  (9:38 AM) 98/77  (9:39 AM)   12/13/2024 154/91/78  (8:17 AM) 97/82  (8:18 AM)   12/12/2024 136/85/79  (8:48 AM) 97/78  (8:49 AM)   12/11/2024 125/71/81  (9:24 AM) 96/81  (9:24 AM)   12/10/2024 149/97/84  (8:53 AM) 98/82  (8:54 AM)       Offered patient enrollment in the Remote Patient Monitoring (RPM) program for in-home monitoring: Yes, patient enrolled; current status is activated and monitoring.     Assessments Completed:   COPD Assessment    Does the patient understand envrionmental exposure?: No  Is the patient able to verbalize Rescue vs. Long Acting medications?: Yes  Does the patient have a nebulizer?: Yes  Does the patient use a space with inhaled medications?: No     No patient-reported symptoms         Symptoms:           ,   General Assessment    Do you have any symptoms that are causing concern?: No          Medications Reviewed:   Completed during a previous call     Advance Care Planning:   Reviewed during previous call      Care Planning:   Education Documentation  Educate transfer safety, taught by Gregoria Joe RN at 2/7/2025  4:23 PM.  Learner: Patient  Readiness: Acceptance  Method: Explanation, Teachback  Response: Verbalizes Understanding    Provide High Risk Information for Falls Program, taught by Gregoria Joe, RN at 2/7/2025  4:23 PM.  Learner: Patient  Readiness:

## 2025-02-11 NOTE — TELEPHONE ENCOUNTER
Medication:   Requested Prescriptions     Pending Prescriptions Disp Refills    tiZANidine (ZANAFLEX) 4 MG tablet [Pharmacy Med Name: TIZANIDINE 4MG TABLETS] 90 tablet 1     Sig: TAKE 1 TABLET BY MOUTH THREE TIMES DAILY AS NEEDED FOR MUSCLE SPASM        Last Filled:  12/12/2024    Patient Phone Number: [phone] (home)     Last appt: 1/15/2025   Next appt: Visit date not found    Last OARRS:        No data to display

## 2025-02-12 ENCOUNTER — CARE COORDINATION (OUTPATIENT)
Dept: CASE MANAGEMENT | Age: 75
End: 2025-02-12

## 2025-02-12 NOTE — CARE COORDINATION
2025 8:32 AM  *  Alert and Triage   -Remote Alert Monitoring Note      Date/Time:  2025 8:32 AM  Patient Current Location: [place]  Verified patients name and  as identifiers.    Rpm alert to be reviewed by the provider                      LPN contacted patient by telephone regarding red alert received   Background: Pt enrolled for COPD and covid-19  Refer to 911 immediately if:  Patient unresponsive or unable to provide history  Change in cognition or sudden confusion  Patient unable to respond in complete sentences  Intense chest pain/tightness  Any concern for any clinical emergency  Red Alert: Provider response time of 1 hr required for any red alert requiring intervention  Yellow Alert: Provider response time of 3hr required for any escalated yellow alert  Patient Chief Complaint:  BP Triage  Are you having any Chest Pain? no   Are you having any Shortness of Breath? no   Do you have a headache or have any vision changes? no   Are you having any numbness or tingling? no   Are you having any other health concerns or issues? no  Patient/Caregiver educated on how to properly take a blood pressure. Patient/Caregiver verbalizes understanding.         Clinical Interventions:  LPN contacted pt in regard to RPM red alerts for BP of, 178/105. Pt denied any new, worrisome and or worsening symptoms at this time.      Writer confirmed with patient that he has no chest pain, SOB, headache, vision changes, numbness, tingling, N/V, dizziness/lightheadedness or any other concerns at this time.      Pt rechecked BP, 154/93, WNL for this pt.             Plan/Follow Up: Will continue to review, monitor and address alerts with follow up based on severity of symptoms and risk factors.  **For any new or worsening symptoms or you are concerned in anyway, please contact your Provider or report to the nearest Emergency Room.**        María Orta LPN, [facility], Remote Patient Monitoring     PH: [phone]  Email:

## 2025-03-06 ENCOUNTER — CARE COORDINATION (OUTPATIENT)
Dept: CARE COORDINATION | Age: 75
End: 2025-03-06

## 2025-03-06 NOTE — CARE COORDINATION
caregiver effective coping behavior, taught by Gregoria Joe, RN at 3/6/2025  1:41 PM.  Learner: Patient  Readiness: Acceptance  Method: Explanation, Teachback  Response: Verbalizes Understanding    Adaptive Equipment, taught by Gregoria Joe RN at 3/6/2025  1:41 PM.  Learner: Patient  Readiness: Acceptance  Method: Explanation, Teachback  Response: Verbalizes Understanding    Education Comments  No comments found.     ,    Goals Addressed                   This Visit's Progress     Conditions and Symptoms   On track     I will schedule office visits, as directed by my provider.  I will keep my appointment or reschedule if I have to cancel.  I will notify my provider of any barriers to my plan of care.  I will follow my Zone Management tool to seek urgent or emergent care.  I will notify my provider of any symptoms that indicate a worsening of my condition.    Barriers: overwhelmed by complexity of regimen  Plan for overcoming my barriers: education and support   Confidence: 7/10  Anticipated Goal Completion Date: 1/28/25                 PCP/Specialist follow up:       Follow Up:   Plan for next ACM outreach in approximately 1 month to complete:  - goal progression  - education   - RPM.   Patient  is agreeable to this plan.

## 2025-03-07 ENCOUNTER — CARE COORDINATION (OUTPATIENT)
Dept: CARE COORDINATION | Age: 75
End: 2025-03-07

## 2025-03-07 NOTE — CARE COORDINATION
Contacted Jenna Merida regarding Dietitian referral. Patient answered, RD explained reason for call and role in care. Patient states that she is headed out to a therapy appointment with her , and requested RD call back next week. Will contact patient next week, per her request, and follow up as appropriate.       Nadia Dias RDN, LD   [phone]

## 2025-03-07 NOTE — CARE COORDINATION
-Remote Alert Monitoring Note      Date/Time:  3/7/2025 9:21 AM  Patient Current Location: [place]  Verified patients name and  as identifiers.    Rpm alert to be reviewed by the provider                    LPN contacted patient by telephone regarding red alert received   Background: Pt enrolled for covid and COPD  Refer to 911 immediately if:  Patient unresponsive or unable to provide history  Change in cognition or sudden confusion  Patient unable to respond in complete sentences  Intense chest pain/tightness  Any concern for any clinical emergency  Red Alert: Provider response time of 1 hr required for any red alert requiring intervention  Yellow Alert: Provider response time of 3hr required for any escalated yellow alert    Clinical Interventions:  Spoke with pt, she reports she had an incidence of severe coughing this morning that has since resolved. Denies SOB/CP, no fevers or other systemic sx. No audible wheezing, spo2 is WNL at 99%. Pt advised to monitor for further symptoms and contact her dr on call over the weekend if needed. She VU and has the contact number if needed. Other metrics are WNL    Plan/Follow Up: Will continue to review, monitor and address alerts with follow up based on severity of symptoms and risk factors.  **For any new or worsening symptoms or you are concerned in anyway, please contact your Provider or report to the nearest Emergency Room.**

## 2025-03-11 ENCOUNTER — CARE COORDINATION (OUTPATIENT)
Dept: CARE COORDINATION | Age: 75
End: 2025-03-11

## 2025-03-11 NOTE — CARE COORDINATION
Jenna Merida  March 11, 2025    Initial Referral Reason: Poor Appetite, Difficulty Maintaining Weight     Patient Care Team:  Lobo Palmer MD as PCP - General  [facility]obo Palmer MD [facility]  Bhaskar Barahona MD as Surgeon (General Surgery)  Gregoria Joe, RN as Ambulatory Care Manager  Nadia Dias RD as Dietitian (Dietitian Registered)    Past Medical History:    Current Outpatient Medications   Medication Sig Dispense Refill    tiZANidine (ZANAFLEX) 4 MG tablet TAKE 1 TABLET BY MOUTH THREE TIMES DAILY AS NEEDED FOR MUSCLE SPASM 90 tablet 1    verapamil (CALAN SR) 120 MG extended release tablet Take 1 tablet by mouth 2 times daily 180 tablet 3    tiotropium (SPIRIVA HANDIHALER) 18 MCG inhalation capsule Inhale 1 capsule into the lungs daily 90 capsule 1    albuterol sulfate HFA (VENTOLIN HFA) 108 (90 Base) MCG/ACT inhaler Inhale 2 puffs into the lungs 4 times daily as needed for Wheezing 18 g 5    lidocaine (LIDODERM) 5 % Place 1 patch onto the skin daily 12 hours on, 12 hours off. 90 patch 0    predniSONE (DELTASONE) 10 MG tablet Take 4,4,4,3,3,3,2,2,2,1,1,1po qd as dir 30 tablet 0    ibuprofen (ADVIL;MOTRIN) 600 MG tablet Take 1 tablet by mouth 3 times daily as needed for Pain 30 tablet 0    primidone (MYSOLINE) 50 MG tablet Take 1 tablet in the morning, 1 tablet at noon and 2 tablets at night 270 tablet 3    ondansetron (ZOFRAN-ODT) 4 MG disintegrating tablet Take 1 tablet by mouth 3 times daily as needed for Nausea or Vomiting 21 tablet 0    SUMAtriptan (IMITREX) 100 MG tablet TAKE 1 TABLET BY MOUTH FOR 1 DOSE AS NEEDED FOR MIGRAINE 9 tablet 5    aspirin 81 MG EC tablet Take 1 tablet by mouth daily      Cholecalciferol (VITAMIN D PO) Take by mouth      Ascorbic Acid (VITAMIN C PO) Take by mouth       No current facility-administered medications for this visit.       Biochemical Data, Medical Tests and Procedures:    Lab Results   Component Value Date    LABA1C 5.1

## 2025-03-27 ENCOUNTER — HOSPITAL ENCOUNTER (OUTPATIENT)
Dept: PULMONOLOGY | Age: 75
Discharge: HOME | End: 2025-03-27
Payer: MEDICARE

## 2025-03-27 VITALS — HEART RATE: 74 BPM | RESPIRATION RATE: 16 BRPM | OXYGEN SATURATION: 98 %

## 2025-03-27 DIAGNOSIS — J44.9 CHRONIC OBSTRUCTIVE PULMONARY DISEASE, UNSPECIFIED COPD TYPE (HCC): ICD-10-CM

## 2025-03-27 LAB
EXPIRATORY TIME-POST: NORMAL
EXPIRATORY TIME: NORMAL
FEF 25-75 %CHNG: NORMAL
FEF 25-75 POST %PRED: NORMAL
FEF 25-75% %PRED-PRE: NORMAL
FEF 25-75% PRED: NORMAL
FEF 25-75-POST: NORMAL
FEF 25-75-PRE: NORMAL
FEV1 %PRED-POST: 143 %
FEV1 %PRED-PRE: 147 %
FEV1 PRED: NORMAL
FEV1-POST: NORMAL
FEV1-PRE: NORMAL
FEV1/FVC %PRED-POST: NORMAL
FEV1/FVC %PRED-PRE: NORMAL
FEV1/FVC PRED: NORMAL
FEV1/FVC-POST: 90 %
FEV1/FVC-PRE: 90 %
FVC %PRED-POST: NORMAL
FVC %PRED-PRE: NORMAL
FVC PRED: NORMAL
FVC-POST: NORMAL
FVC-PRE: NORMAL
PEF %PRED-POST: NORMAL
PEF %PRED-PRE: NORMAL
PEF PRED: NORMAL
PEF%CHNG: NORMAL
PEF-POST: NORMAL
PEF-PRE: NORMAL

## 2025-03-27 PROCEDURE — 94060 EVALUATION OF WHEEZING: CPT

## 2025-03-27 PROCEDURE — 94760 N-INVAS EAR/PLS OXIMETRY 1: CPT

## 2025-03-27 PROCEDURE — 6370000000 HC RX 637 (ALT 250 FOR IP): Performed by: STUDENT IN AN ORGANIZED HEALTH CARE EDUCATION/TRAINING PROGRAM

## 2025-03-27 RX ORDER — ALBUTEROL SULFATE 90 UG/1
4 INHALANT RESPIRATORY (INHALATION) ONCE
Status: COMPLETED | OUTPATIENT
Start: 2025-03-27 | End: 2025-03-27

## 2025-03-27 RX ADMIN — Medication 4 PUFF: at 10:44

## 2025-03-27 ASSESSMENT — PULMONARY FUNCTION TESTS
FEV1/FVC_PRE: 90
FEV1/FVC_POST: 90
FEV1_PERCENT_PREDICTED_POST: 143
FEV1_PERCENT_PREDICTED_PRE: 147

## 2025-03-28 NOTE — PROCEDURES
Pulmonary Function Testing      Patient name:  Jenna Merida      Unit #:   [id number]   Date of test:  3/27/2025   Date of interpretation:   3/28/2025    Ms. Jenna Merida is a 74 y.o. year-old female. The spirometry data were acceptable and reproducible.     Spirometry:  Flow volume loops were normal. The FEV-1/FVC ratio was normal. The pre-bronchodilator FEV-1 was 2.54 liters (2.67 Z score), which was increased. The FVC was 2.81 liters (1.41 Z score), which was normal. Response to inhaled bronchodilators (albuterol) was not significant.    Interpretation:  Normal spirometry    Comments:  Z score  Mild -1.645 to -2.5  Moderate -2.5 to -4.0  Severe: < -4.0     Using Z-scores can reduce age and height bias, and the recommended thresholds correlate with mortality risk.    Kendell Bridges MD

## 2025-04-01 ENCOUNTER — CARE COORDINATION (OUTPATIENT)
Dept: CARE COORDINATION | Age: 75
End: 2025-04-01

## 2025-04-01 NOTE — CARE COORDINATION
Jenna Merida  4/1/2025    Registered Dietitian Progress Note for Care Coordination    Assessment: Jenna is a 74 y.o. female.  RD referred for poor appetite, difficulty maintaining weight. RD spoke with patient for initial nutrition assessment on 3/11/25. RD called to follow up with patient today, 4/1/25. RD discussed previous goals with patient. Patient states that she received the nutrition handouts and Ensure coupons RD sent in the mail. Requesting additional Ensure coupons; RD will have some sent out. Patient did not have any questions/concerns regarding the nutrition handouts. Patient reports that her appetite remains \"about the same.\" Has purchased Ensure, and has been making smoothies with them. RD encouraged patient to consume at least one Ensure daily. Patient verbalized understanding, and stated, \"absolutely.\" Patient ate two meals yesterday, see food recall below. Patient reports that she has been drinking \"a lot\" of water to stay hydrated. Reports a current weight of 110 lbs, which is a three pound (2.7%) weight loss x three weeks.      24-Hour Food Recall:   Breakfast - None   Lunch - Ham sandwich (consumed 100%)  Dinner - Steak, onion rings (consumed >75%)  Snacks - None   Beverages - Water, Coke Black     Nutrition Monitoring and Evaluation  Indicator/Goal Criteria Progress   #1 Eat consistently throughout the day  #1  I will eat three meals/day or four to six small meals  #1 Patient is eating two meals/day   #2  Utilize oral nutrition supplements/protein shakes  #2  I will drink ONS/protein shake to aid in meeting estimated nutrition needs  #2 Patient is drinking at least one ONS daily; making smoothies with ONS     Plan of Care:  RD encouraged patient to keep working toward goals set. RD will mail additional Ensure coupons. RD will follow up with patient to ensure coupons were received, discuss any questions patient has and check the progress toward goals.     Follow Up:    RD will call

## 2025-04-02 ENCOUNTER — CLINICAL DOCUMENTATION (OUTPATIENT)
Dept: PULMONOLOGY | Age: 75
End: 2025-04-02

## 2025-04-02 ENCOUNTER — RESULTS FOLLOW-UP (OUTPATIENT)
Dept: PULMONOLOGY | Age: 75
End: 2025-04-02

## 2025-04-02 ENCOUNTER — CARE COORDINATION (OUTPATIENT)
Dept: CARE COORDINATION | Age: 75
End: 2025-04-02

## 2025-04-02 NOTE — PROGRESS NOTES
PFT   Pre-bronch FVC 2.81L, 126%  Pre-Bronch FEV1 2.54L, 147%  FEV1/FVC 90  Post bronch FVC 2.74L, 123%, -2%  Post bronch FEV1 2.47L, 143%, -3%  FEV1/FVC 90    No obstruction to suggest.   Height is incorrect, discussed RT to change  It should not change ratio    Rojelio Bell MD   Pulmonary and Critical Care Medicine  [facility]

## 2025-04-02 NOTE — CARE COORDINATION
Ambulatory Care Coordination Note     2025 1:38 PM     Patient Current Location:  [place]     ACM contacted the patient by telephone. Verified name and  with patient as identifiers.         ACM: Gregoria Joe RN     Challenges to be reviewed by the provider   Additional needs identified to be addressed with provider No  none               Method of communication with provider: none.    Utilization: Patient has not had any utilization since our last call.     Care Summary Note:     ACM made care coordination outreach and spoke with patient. Patient pleasant on the phone while conversing with ACM. Patient stated that she is feeling \"good.\" No pain. Denied any chest pain, shortness of breath or swelling. RPM reviewed. Discussed BP. Discussed PFT. Working with dietician. Drinking ensure smoothies daily. Declined scheduling AWV. Stated that she will call the office to schedule if needed. Reviewed upcoming pulmonology appointment. Declined any other questions or concerns at this time. Agreeable to future care coordination outreach. ACM to follow up at a later date.     Offered patient enrollment in the Remote Patient Monitoring (RPM) program for in-home monitoring: Yes, patient enrolled; current status is activated and monitoring.  Date BP Pulse Ox   2025 144/81/87  (8:08 AM) 99/93  (8:09 AM)   2025 140/87/99  (8:26 AM) 98/98  (8:26 AM)   3/31/2025 140/85/88  (8:28 AM) 98/90  (8:29 AM)   3/30/2025 132/79/83  (8:40 AM) 98/84  (8:40 AM)   3/29/2025 140/87/89  (9:08 AM) 98/92  (9:09 AM)   3/28/2025 144/81/89  (8:58 AM) 99/88  (8:59 AM)   3/27/2025 136/84/91  (7:47 AM) 99/89  (7:48 AM)   3/26/2025 132/83/97  (9:20 AM) 98/100  (9:20 AM)   3/25/2025 137/82/93  (9:14 AM) 98/94  (9:15 AM)   3/24/2025 145/89/100  (9:04 AM) 98/101  (9:05 AM)   3/23/2025 136/89/91  (9:48 AM) 99/90  (9:48 AM)   3/22/2025 154/84/91  (9:11 AM) 97/95  (9:12 AM)   3/21/2025 137/72/96  (8:31 AM) 98/99  (8:31 AM)   3/20/2025 144/84/97  (8:45 AM)

## 2025-04-15 NOTE — TELEPHONE ENCOUNTER
Medication:   Requested Prescriptions     Pending Prescriptions Disp Refills    tiZANidine (ZANAFLEX) 4 MG tablet [Pharmacy Med Name: TIZANIDINE 4MG TABLETS] 90 tablet 1     Sig: TAKE 1 TABLET BY MOUTH THREE TIMES DAILY AS NEEDED FOR MUSCLE SPASM        Last Filled:  2/11/25    Patient Phone Number: [phone] (home)     Last appt: 1/15/2025   Next appt: Visit date not found    Last OARRS:        No data to display

## 2025-04-16 ENCOUNTER — CARE COORDINATION (OUTPATIENT)
Dept: CASE MANAGEMENT | Age: 75
End: 2025-04-16

## 2025-04-16 NOTE — CARE COORDINATION
2025 7:45 AM  *  Alert and Triage   -Remote Alert Monitoring Note      Date/Time:  2025 7:45 AM  Patient Current Location: [place]  Verified patients name and  as identifiers.    Rpm alert to be reviewed by the provider   red alert  blood pressure reading ( )  Vitals Recheck blood pressure reading ( )  Additional needs to be addressed by provider:                      LPN contacted patient by telephone regarding red alert received   Background: Pt enrolled for COPD and covid-19  Refer to 911 immediately if:  Patient unresponsive or unable to provide history  Change in cognition or sudden confusion  Patient unable to respond in complete sentences  Intense chest pain/tightness  Any concern for any clinical emergency  Red Alert: Provider response time of 1 hr required for any red alert requiring intervention  Yellow Alert: Provider response time of 3hr required for any escalated yellow alert  Patient Chief Complaint:  BP Triage  Are you having any Chest Pain? no   Are you having any Shortness of Breath? no   Do you have a headache or have any vision changes? no   Are you having any numbness or tingling? no   Are you having any other health concerns or issues? no  Patient/Caregiver educated on how to properly take a blood pressure. Patient/Caregiver verbalizes understanding.         Clinical Interventions:  LPN contacted pt in regard to RPM red alerts for BP of, 168/103. Pt denied any new, worrisome and or worsening symptoms at this time.      Writer confirmed with patient that she has no chest pain, SOB, headache, vision changes, numbness, tingling, N/V, dizziness/lightheadedness or any other concerns at this time.      Pt had not taken BP meds prior to checking BP. Pt will recheck BP in one hour as pt just took her BP meds. Writer will f/u when pt rechecks BP.              Plan/Follow Up: Will continue to review, monitor and address alerts with follow up based on severity of symptoms and risk factors.  **For

## 2025-04-28 ENCOUNTER — CARE COORDINATION (OUTPATIENT)
Dept: CARE COORDINATION | Age: 75
End: 2025-04-28

## 2025-04-28 NOTE — CARE COORDINATION
-Remote Alert Monitoring Note      Date/Time:  2025 9:14 AM  Patient Current Location: Home: [address] [place] [id number]  Verified patients name and  as identifiers.    Rpm alert to be reviewed by the provider   red alert  blood pressure reading (157/101)                   LPN contacted patient by telephone regarding red alert received   Background: Pt enrolled for COPD and Covid-19  Refer to 911 immediately if:  Patient unresponsive or unable to provide history  Change in cognition or sudden confusion  Patient unable to respond in complete sentences  Intense chest pain/tightness  Any concern for any clinical emergency  Red Alert: Provider response time of 1 hr required for any red alert requiring intervention  Yellow Alert: Provider response time of 3hr required for any escalated yellow alert  Patient Chief Complaint:  Blood Pressure BP Triage  Are you having any Chest Pain? no   Are you having any Shortness of Breath? no   Do you have a headache or have any vision changes? no   Are you having any numbness or tingling? No   Are you having any other health concerns or issues? no  Patient/Caregiver educated on how to properly take a blood pressure. Patient/Caregiver verbalizes understanding.     Clinical Interventions:  Spoke with pt, she reports she is doing well today. She has no concerning symptoms. She reports she has taken her medications today already but she plans to recheck her BP around 10am. Will monitor for updated reading at that time or when she is able to recheck.  Other metrics are WNL      Plan/Follow Up: Will continue to review, monitor and address alerts with follow up based on severity of symptoms and risk factors.  **For any new or worsening symptoms or you are concerned in anyway, please contact your Provider or report to the nearest Emergency Room.**

## 2025-04-29 ENCOUNTER — CARE COORDINATION (OUTPATIENT)
Dept: CARE COORDINATION | Age: 75
End: 2025-04-29

## 2025-04-29 NOTE — CARE COORDINATION
Contacted Jenna Merida and left voicemail regarding Dietitian follow up. Left call back number and will follow up in one week or as appropriate.       Nadia Dias RDN, LD  [phone]

## 2025-05-01 ENCOUNTER — CARE COORDINATION (OUTPATIENT)
Dept: CARE COORDINATION | Age: 75
End: 2025-05-01

## 2025-05-01 NOTE — CARE COORDINATION
Ambulatory Care Coordination Note     5/1/2025 2:43 PM     Patient outreach attempt by this ACM today to perform care management follow up . ACM was unable to reach the patient by telephone today;   left voice message requesting a return phone call to this ACM.     ACM: Gregoria Joe RN    PCP/Specialist follow up:       Follow Up:   Plan for next ACM outreach in approximately 3 weeks to complete:  - goal progression  - education   - RPM.

## 2025-05-06 ENCOUNTER — CARE COORDINATION (OUTPATIENT)
Dept: CARE COORDINATION | Age: 75
End: 2025-05-06

## 2025-05-06 NOTE — CARE COORDINATION
Contacted Jenna Merida and left voicemail regarding Dietitian follow up. Left call back number and will follow up in one week or as appropriate.       Nadia Dias RDN, LD  [phone]

## 2025-05-14 ENCOUNTER — CARE COORDINATION (OUTPATIENT)
Dept: CARE COORDINATION | Age: 75
End: 2025-05-14

## 2025-05-14 NOTE — CARE COORDINATION
Contacted Jenna Merida and left voicemail regarding Dietitian follow up.  RD has attempted to call patient three times. Provided call back number. RD sign off at this time and notify ACM. RD will follow/assist patient should phone call be returned.      Nadia Dias RDN, LD   [phone]

## 2025-05-22 ENCOUNTER — CARE COORDINATION (OUTPATIENT)
Dept: CARE COORDINATION | Age: 75
End: 2025-05-22

## 2025-05-22 NOTE — CARE COORDINATION
Ambulatory Care Coordination Note     5/22/2025 9:15 AM     Patient outreach attempt by this ACM today to perform care management follow up . ACM was unable to reach the patient by telephone today;   left voice message requesting a return phone call to this ACM.     ACM: Gregoria Joe RN    PCP/Specialist follow up:   Future Appointments         Provider Specialty Dept Phone    6/4/2025 10:30 AM Lobo Palmer MD Family Medicine [phone]            Follow Up:   Plan for next ACM outreach in approximately 3 weeks to complete:  - goal progression  - education   - RPM.

## 2025-06-09 ENCOUNTER — CARE COORDINATION (OUTPATIENT)
Dept: CARE COORDINATION | Age: 75
End: 2025-06-09

## 2025-06-09 NOTE — CARE COORDINATION
Ambulatory Care Coordination Note     6/9/2025 1:07 PM     Parent outreach attempt by this ACM today to perform care management follow up . ACM was unable to reach the patient by telephone today;   left voice message requesting a return phone call to this ACM.     ACM: Gregoria Joe RN    PCP/Specialist follow up:       Follow Up:   Plan for next ACM outreach in approximately 1 month to complete:  - goal progression  - education   - RPM.

## 2025-07-03 ENCOUNTER — CARE COORDINATION (OUTPATIENT)
Dept: CARE COORDINATION | Age: 75
End: 2025-07-03

## 2025-07-03 NOTE — CARE COORDINATION
Ambulatory Care Coordination Note     7/3/2025 11:12 AM     Patient outreach attempt by this ACM today to perform care management follow up . ACM was unable to reach the patient by telephone today;   left voice message requesting a return phone call to this ACM.     ACM: Gregoria Joe RN    PCP/Specialist follow up:       Follow Up:   Plan for next ACM outreach in approximately 3 weeks to complete:  - goal progression  - education   - RPM.

## 2025-07-17 ENCOUNTER — CARE COORDINATION (OUTPATIENT)
Dept: PRIMARY CARE CLINIC | Age: 75
End: 2025-07-17

## 2025-07-17 ENCOUNTER — CARE COORDINATION (OUTPATIENT)
Dept: CARE COORDINATION | Age: 75
End: 2025-07-17

## 2025-07-17 DIAGNOSIS — J43.9 PULMONARY EMPHYSEMA, UNSPECIFIED EMPHYSEMA TYPE (HCC): Primary | ICD-10-CM

## 2025-07-17 NOTE — CARE COORDINATION
Ambulatory Care Coordination Note     7/17/2025 9:17 AM     patient outreach attempt by this ACM today to perform care management follow up . ACM was unable to reach the patient by telephone today;   left voice message requesting a return phone call to this ACM.     Patient closed (unable to reach patient) from the High Risk Care Management program on 7/17/2025.      Remote Patient Monitoring Disenrollment      Date/Time:  7/17/2025 9:22 AM  Patient Current Location: [place]  Patient has been dis-enrolled from Remote Patient Monitoring program on 7/17/2025 due to disengagement. Patient has been provided instruction on process to return RPM equipment and RPM has been deactivated.     Patient has ACM's contact information for any further questions, concerns, or needs.

## 2025-07-17 NOTE — PROGRESS NOTES
Bactrim sent to pharmacy   Remote Patient Order Discontinued    Received request from Gregoria Joe RN   to discontinue order for remote patient monitoring of COVID and COPD and order completed.

## 2025-07-17 NOTE — CARE COORDINATION
RPM Kit Return    Patient Jenna Merida  07/17/25     Care Coordination  placed call to patient to arrange RPM kit  through [employer]. Left HIPAA Compliant Message     provided return and how to pack equipment in original packing via the patients voicemail if available and provided call back number should patient have questions.    Patient made aware [employer] will  equipment in 2-4 days.

## 2025-08-20 ENCOUNTER — OFFICE VISIT (OUTPATIENT)
Dept: FAMILY MEDICINE CLINIC | Age: 75
End: 2025-08-20

## 2025-08-20 VITALS
HEIGHT: 62 IN | WEIGHT: 111 LBS | DIASTOLIC BLOOD PRESSURE: 80 MMHG | SYSTOLIC BLOOD PRESSURE: 116 MMHG | OXYGEN SATURATION: 98 % | BODY MASS INDEX: 20.43 KG/M2 | HEART RATE: 73 BPM

## 2025-08-20 DIAGNOSIS — G43.909 MIGRAINE WITHOUT STATUS MIGRAINOSUS, NOT INTRACTABLE, UNSPECIFIED MIGRAINE TYPE: ICD-10-CM

## 2025-08-20 DIAGNOSIS — E78.5 HYPERLIPIDEMIA, UNSPECIFIED HYPERLIPIDEMIA TYPE: ICD-10-CM

## 2025-08-20 DIAGNOSIS — C64.1 MALIGNANT NEOPLASM OF RIGHT KIDNEY, EXCEPT RENAL PELVIS (HCC): Primary | ICD-10-CM

## 2025-08-20 DIAGNOSIS — G25.0 BENIGN ESSENTIAL TREMOR: ICD-10-CM

## 2025-08-20 DIAGNOSIS — C50.911 BILATERAL MALIGNANT NEOPLASM OF BREAST IN FEMALE, UNSPECIFIED ESTROGEN RECEPTOR STATUS, UNSPECIFIED SITE OF BREAST (HCC): ICD-10-CM

## 2025-08-20 DIAGNOSIS — M81.8 OTHER OSTEOPOROSIS WITHOUT CURRENT PATHOLOGICAL FRACTURE: ICD-10-CM

## 2025-08-20 DIAGNOSIS — C50.912 BILATERAL MALIGNANT NEOPLASM OF BREAST IN FEMALE, UNSPECIFIED ESTROGEN RECEPTOR STATUS, UNSPECIFIED SITE OF BREAST (HCC): ICD-10-CM

## 2025-08-20 RX ORDER — PRIMIDONE 50 MG/1
TABLET ORAL
Qty: 270 TABLET | Refills: 3 | Status: SHIPPED | OUTPATIENT
Start: 2025-08-20

## 2025-08-20 RX ORDER — SUMATRIPTAN SUCCINATE 100 MG/1
TABLET ORAL
Qty: 27 TABLET | Refills: 3 | Status: SHIPPED | OUTPATIENT
Start: 2025-08-20

## 2025-08-20 ASSESSMENT — PATIENT HEALTH QUESTIONNAIRE - PHQ9
2. FEELING DOWN, DEPRESSED OR HOPELESS: NOT AT ALL
SUM OF ALL RESPONSES TO PHQ QUESTIONS 1-9: 0
1. LITTLE INTEREST OR PLEASURE IN DOING THINGS: NOT AT ALL

## 2025-08-20 ASSESSMENT — LIFESTYLE VARIABLES
HOW MANY STANDARD DRINKS CONTAINING ALCOHOL DO YOU HAVE ON A TYPICAL DAY: PATIENT DOES NOT DRINK
HOW OFTEN DO YOU HAVE A DRINK CONTAINING ALCOHOL: NEVER

## (undated) DEVICE — SET VLV 3 PC AWS DISPOSABLE GRDIAN SCOPEVALET

## (undated) DEVICE — SUTURE V-LOC 90 3-0 L6IN ABSRB UD CV-23 L17MM 1/2 CIR VLOCM1904

## (undated) DEVICE — MEDICINE CUP, GRADUATED, STER: Brand: MEDLINE

## (undated) DEVICE — BW-412T DISP COMBO CLEANING BRUSH: Brand: SINGLE USE COMBINATION CLEANING BRUSH

## (undated) DEVICE — PACK PROCEDURE SURG EXTREMITY MFFOP CUST

## (undated) DEVICE — STAPLER 45 RELOAD WHITE: Brand: ENDOWRIST

## (undated) DEVICE — KIT OR ROOM TURNOVER W/STRAP

## (undated) DEVICE — SEAL

## (undated) DEVICE — ELECTRODE PT RET AD L9FT HI MOIST COND ADH HYDRGEL CORDED

## (undated) DEVICE — DRAPE CARM MINI FOR IMAG SYS INSIGHT FLROSCN

## (undated) DEVICE — SYRINGE IRRIG 60ML SFT PLIABLE BLB EZ TO GRP 1 HND USE W/

## (undated) DEVICE — ARM DRAPE

## (undated) DEVICE — NEEDLE HYPO 22GA L1 1/2IN PIVOTING SHLD FOR LUERLOCK SYR

## (undated) DEVICE — SUTURE MCRYL SZ 4-0 L27IN ABSRB UD L19MM PS-2 1/2 CIR PRIM Y426H

## (undated) DEVICE — 3M™ STERI-DRAPE™ INCISE DRAPE 1050 (60CM X 45CM): Brand: STERI-DRAPE™

## (undated) DEVICE — 30978 SEE SHARP - ENHANCED INTRAOPERATIVE LAPAROSCOPE CLEANING & DEFOGGING: Brand: 30978 SEE SHARP - ENHANCED INTRAOPERATIVE LAPAROSCOPE CLEANING & DEFOGGING

## (undated) DEVICE — PENCIL ES L3M BTTN SWCH S STL HEX LOK BLDE ELECTRD HOLSTER

## (undated) DEVICE — TROCAR: Brand: KII FIOS FIRST ENTRY

## (undated) DEVICE — STAPLER 45 RELOAD: Brand: ENDOWRIST

## (undated) DEVICE — ZIMMER® STERILE DISPOSABLE TOURNIQUET CUFF WITH PLC, DUAL PORT, SINGLE BLADDER, 18 IN. (46 CM)

## (undated) DEVICE — TOWEL,OR,DSP,ST,BLUE,STD,4/PK,20PK/CS: Brand: MEDLINE

## (undated) DEVICE — GAUZE,SPONGE,4"X4",8PLY,STRL,LF,10/TRAY: Brand: MEDLINE

## (undated) DEVICE — SUTURE DEV SZ 2-0 WND CLSR ABSRB GS-22 VLOC COVIDIEN VLOCM2145

## (undated) DEVICE — STERILE LATEX POWDER FREE SURGICAL GLOVES WITH HYDROGEL COATING: Brand: PROTEXIS

## (undated) DEVICE — DRILL BIT, AO, SCALED: Brand: VARIAX

## (undated) DEVICE — MEDIUM-LARGE CLIP APPLIER: Brand: ENDOWRIST

## (undated) DEVICE — SPONGE LAP W18XL18IN WHT COT 4 PLY FLD STRUNG RADPQ DISP ST

## (undated) DEVICE — SUTURE VCRL SZ 3-0 L18IN ABSRB UD L26MM SH 1/2 CIR J864D

## (undated) DEVICE — CHLORAPREP 26ML ORANGE

## (undated) DEVICE — KIRSCHNER WIRE
Type: IMPLANTABLE DEVICE | Site: WRIST | Status: NON-FUNCTIONAL
Brand: VARIAX
Removed: 2019-09-10

## (undated) DEVICE — CANNULA SEAL

## (undated) DEVICE — BAG RETRIEVAL SPECIMEN SUPERBAG 15 2XL NYLON ITRODUCER

## (undated) DEVICE — TIP COVER ACCESSORY

## (undated) DEVICE — LIGHT HANDLE: Brand: DEVON

## (undated) DEVICE — SOLUTION IV IRRIG 500ML 0.9% SODIUM CHL 2F7123

## (undated) DEVICE — SOLUTION IRRIG 1000ML 0.9% SOD CHL USP POUR PLAS BTL

## (undated) DEVICE — SUTURE VLOC 90 2/0 VL 6 GS-22 VLOCM2105

## (undated) DEVICE — PROCEDURE KIT ENDOSCP CUST

## (undated) DEVICE — Z DISCONTINUED USE 2275686 GLOVE SURG SZ 8 L12IN FNGR THK13MIL WHT ISOLEX POLYISOPRENE

## (undated) DEVICE — BLADELESS OBTURATOR: Brand: WECK VISTA

## (undated) DEVICE — MASTISOL ADHESIVE LIQ 2/3ML

## (undated) DEVICE — TTL1LYR 16FR10ML 100%SIL TMPST TR: Brand: MEDLINE

## (undated) DEVICE — GLOVE SURG SZ 65 L12IN FNGR THK79MIL GRN LTX FREE

## (undated) DEVICE — ADHESIVE SKIN CLSR 0.7ML TOP DERMBND ADV

## (undated) DEVICE — DRAPE HND W114XL142IN BLU POLYPR W O PCH FEN CRD AND TB HLDR

## (undated) DEVICE — SUTURE PERMAHAND SZ 2-0 L12X18IN NONABSORBABLE BLK SILK A185H

## (undated) DEVICE — SUTURE MCRYL + SZ 4-0 L27IN ABSRB UD L19MM PS-2 3/8 CIR MCP426H

## (undated) DEVICE — AIRSEAL 8 MM ACCESS PORT AND LOW PROFILE OBTURATOR WITH BLADELESS OPTICAL TIP, 120 MM LENGTH: Brand: AIRSEAL

## (undated) DEVICE — Device: Brand: DISPOSABLE ELECTROSURGICAL SNARE

## (undated) DEVICE — INSUFFLATION NEEDLE TO ESTABLISH PNEUMOPERITONEUM.: Brand: INSUFFLATION NEEDLE

## (undated) DEVICE — PADDING CAST W4INXL4YD ST COT RAYON MICROPLEATED HIGHLY

## (undated) DEVICE — 3M™ STERI-STRIP™ REINFORCED ADHESIVE SKIN CLOSURES, R1547, 1/2 IN X 4 IN (12 MM X 100 MM), 6 STRIPS/ENVELOPE: Brand: 3M™ STERI-STRIP™

## (undated) DEVICE — INTENDED FOR TISSUE SEPARATION, AND OTHER PROCEDURES THAT REQUIRE A SHARP SURGICAL BLADE TO PUNCTURE OR CUT.: Brand: BARD-PARKER ® STAINLESS STEEL BLADES

## (undated) DEVICE — TRI-LUMEN FILTERED TUBE SET WITH ACTIVATED CHARCOAL FILTER: Brand: AIRSEAL

## (undated) DEVICE — PROTECTOR EYE PT SELF ADH NS OPT GRD LF

## (undated) DEVICE — MERCY FAIRFIELD TURNOVER KIT: Brand: MEDLINE INDUSTRIES, INC.

## (undated) DEVICE — ROBOTIC PK

## (undated) DEVICE — DRESSING,GAUZE,XEROFORM,CURAD,1"X8",ST: Brand: CURAD

## (undated) DEVICE — PROBE ULTRASOUND HITACHI SM L51K ROBOTIC

## (undated) DEVICE — STAPLER 30 RELOAD WHITE: Brand: ENDOWRIST

## (undated) DEVICE — GLOVE SURG SZ 6 THK91MIL LTX FREE SYN POLYISOPRENE ANTI

## (undated) DEVICE — TRAP SPEC RETRV CLR PLAS POLYP IN LN SUCT QUIK CTCH

## (undated) DEVICE — GLOVE ORANGE PI 8   MSG9080

## (undated) DEVICE — APPLICATOR MEDICATED 26 CC SOLUTION HI LT ORNG CHLORAPREP

## (undated) DEVICE — BANDAGE ELASTIC 5YDX4IN ST COMPRSS LF NON ADH

## (undated) DEVICE — BANDAGE COMPR W4INXL12FT E DISP ESMARCH EVEN

## (undated) DEVICE — 60 ML SYRINGE,REGULAR TIP: Brand: MONOJECT

## (undated) DEVICE — SOLUTION IV IRRIG WATER 500ML POUR BRL ST 2F7113

## (undated) DEVICE — REDUCER: Brand: ENDOWRIST

## (undated) DEVICE — GOWN AURORA NONREINF LG: Brand: MEDLINE INDUSTRIES, INC.

## (undated) DEVICE — COLUMN DRAPE